# Patient Record
Sex: FEMALE | Race: BLACK OR AFRICAN AMERICAN | NOT HISPANIC OR LATINO | Employment: OTHER | URBAN - METROPOLITAN AREA
[De-identification: names, ages, dates, MRNs, and addresses within clinical notes are randomized per-mention and may not be internally consistent; named-entity substitution may affect disease eponyms.]

---

## 2017-04-17 ENCOUNTER — TELEPHONE (OUTPATIENT)
Dept: INTERNAL MEDICINE | Facility: CLINIC | Age: 62
End: 2017-04-17

## 2017-04-17 NOTE — TELEPHONE ENCOUNTER
----- Message from Della Bailon sent at 4/17/2017 12:47 PM CDT -----  Contact: pt   Pt states that she need orders in for labs.   ..[phone] (home) [phone] (work)

## 2017-04-19 ENCOUNTER — OFFICE VISIT (OUTPATIENT)
Dept: INTERNAL MEDICINE | Facility: CLINIC | Age: 62
End: 2017-04-19
Payer: COMMERCIAL

## 2017-04-19 VITALS
DIASTOLIC BLOOD PRESSURE: 65 MMHG | BODY MASS INDEX: 36.47 KG/M2 | WEIGHT: 198.19 LBS | TEMPERATURE: 97 F | HEIGHT: 62 IN | RESPIRATION RATE: 16 BRPM | OXYGEN SATURATION: 99 % | SYSTOLIC BLOOD PRESSURE: 141 MMHG | HEART RATE: 63 BPM

## 2017-04-19 DIAGNOSIS — Z11.59 ENCOUNTER FOR HEPATITIS C SCREENING TEST FOR LOW RISK PATIENT: Primary | ICD-10-CM

## 2017-04-19 DIAGNOSIS — Z00.00 ROUTINE HEALTH MAINTENANCE: ICD-10-CM

## 2017-04-19 DIAGNOSIS — Z13.220 LIPID SCREENING: ICD-10-CM

## 2017-04-19 DIAGNOSIS — M25.561 CHRONIC PAIN OF RIGHT KNEE: ICD-10-CM

## 2017-04-19 DIAGNOSIS — G89.29 CHRONIC PAIN OF RIGHT KNEE: ICD-10-CM

## 2017-04-19 PROCEDURE — 99999 PR PBB SHADOW E&M-EST. PATIENT-LVL III: CPT | Mod: PBBFAC,,, | Performed by: FAMILY MEDICINE

## 2017-04-19 PROCEDURE — 99396 PREV VISIT EST AGE 40-64: CPT | Mod: S$GLB,,, | Performed by: FAMILY MEDICINE

## 2017-04-19 RX ORDER — VITAMIN B COMPLEX
1 CAPSULE ORAL DAILY
COMMUNITY
End: 2021-11-19

## 2017-04-19 RX ORDER — IBUPROFEN 200 MG
200 TABLET ORAL EVERY 6 HOURS PRN
COMMUNITY
End: 2017-12-06 | Stop reason: ALTCHOICE

## 2017-04-19 RX ORDER — CYANOCOBALAMIN (VITAMIN B-12) 250 MCG
250 TABLET ORAL DAILY
COMMUNITY
End: 2021-11-19 | Stop reason: ALTCHOICE

## 2017-04-19 RX ORDER — FERROUS SULFATE, DRIED 160(50) MG
1 TABLET, EXTENDED RELEASE ORAL 2 TIMES DAILY WITH MEALS
COMMUNITY

## 2017-04-19 RX ORDER — BIOTIN 1 MG
1000 TABLET ORAL 3 TIMES DAILY
COMMUNITY

## 2017-04-19 RX ORDER — DICLOFENAC SODIUM 10 MG/G
2 GEL TOPICAL 4 TIMES DAILY
Qty: 100 G | Refills: 2 | Status: SHIPPED | OUTPATIENT
Start: 2017-04-19 | End: 2021-02-18

## 2017-04-19 NOTE — PROGRESS NOTES
Subjective:       Patient ID: Bonnie Vazquez is a 61 y.o. female.    Chief Complaint: Knee Pain (rt ) and Annual Exam    HPI  Here today for routine health maintenance exam.  The only complaint that she has at this time is right knee pain that has been bothering her on and off.  For the last few weeks she has had more pain and is tried taking up to milligrams of ibuprofen a couple times a day and she does not feel that that helped very much.  She does not have any recent trauma or inciting event.  Many years ago she had a fall that caused injury to her right knee but she is not sure if there are any x-ray findings of the time.  She did through physical therapy at that time which was in 2009.  Her left knee does not really bother her very much.  Pain is worse with activity and when she is on her feet a lot.  In regards to health maintenance she is up-to-date on mammogram and Pap smear which she had at [facility].  Not needing any vaccinations at this time.  She thinks she may be coming due for her colonoscopy as her last was in 2010 but it was completely normal.  She is aware that she needs to exercise more frequently to lose weight which will help her knee and also keep blood pressure down.    Family History   Problem Relation Age of Onset    Heart disease Mother     Cataracts Mother     Diabetes Father     Diabetes Brother     Breast cancer Neg Hx     Colon cancer Neg Hx     Ovarian cancer Neg Hx     Thrombophilia Neg Hx        Current Outpatient Prescriptions:     b complex vitamins capsule, Take 1 capsule by mouth once daily., Disp: , Rfl:     biotin 1 mg tablet, Take 1,000 mcg by mouth 3 (three) times daily., Disp: , Rfl:     calcium-vitamin D3 (CALCIUM 500 + D) 500 mg(1,250mg) -200 unit per tablet, Take 1 tablet by mouth 2 (two) times daily with meals., Disp: , Rfl:     cyanocobalamin (VITAMIN B-12) 250 MCG tablet, Take 250 mcg by mouth once daily., Disp: , Rfl:     ibuprofen  "(ADVIL,MOTRIN) 200 MG tablet, Take 200 mg by mouth every 6 (six) hours as needed for Pain., Disp: , Rfl:     Review of Systems   Constitutional: Negative for chills and fever.   HENT: Negative for congestion and sore throat.    Eyes: Negative for visual disturbance.   Respiratory: Negative for cough and shortness of breath.    Cardiovascular: Negative for chest pain.   Gastrointestinal: Negative for abdominal pain, constipation and diarrhea.   Genitourinary: Negative for difficulty urinating and menstrual problem.   Musculoskeletal: Positive for arthralgias.   Skin: Negative for rash.   Neurological: Negative for dizziness.       Objective:   BP (!) 141/65 (BP Location: Left arm, Patient Position: Sitting, BP Method: Automatic)  Pulse 63  Temp 97.2 °F (36.2 °C) (Tympanic)   Resp 16  Ht 5' 1.5" (1.562 m)  Wt 89.9 kg (198 lb 3.1 oz)  SpO2 99%  BMI 36.84 kg/m2     Physical Exam   Constitutional: She is oriented to person, place, and time. She appears well-developed and well-nourished. No distress.   HENT:   Head: Normocephalic and atraumatic.   Nose: Nose normal.   Eyes: Conjunctivae and EOM are normal. Pupils are equal, round, and reactive to light. Right eye exhibits no discharge. Left eye exhibits no discharge.   Neck: No thyromegaly present.   Cardiovascular: Normal rate and regular rhythm.    No murmur heard.  Pulmonary/Chest: Effort normal and breath sounds normal. No respiratory distress.   Abdominal: Soft. She exhibits no distension.   Musculoskeletal: She exhibits no edema.        Right knee: She exhibits normal range of motion (no crepitus), no swelling, no effusion, no deformity, no erythema, normal alignment, no LCL laxity and no MCL laxity. Tenderness found. Medial joint line tenderness noted.   Neurological: She is alert and oriented to person, place, and time.   Skin: No rash noted. She is not diaphoretic.   Psychiatric: She has a normal mood and affect. Her behavior is normal.       Assessment & " Plan     1. Encounter for hepatitis C screening test for low risk patient  - Hepatitis C antibody; Future    2. Routine health maintenance  She is up-to-date on routine health screening as well as vaccinations.  Only question currently is when she needs her next colonoscopy.  I have sent a message to gastroenterology for advice.  - Comprehensive metabolic panel; Future    3. Lipid screening  - Lipid panel; Future    4. Chronic pain of right knee  Most likely diagnosis is osteoarthritis.  We'll get x-ray imaging to further assess.  Recommended using Voltaren gel since oral anti-inflammatory medication has not been very effective.  Will consider corticosteroid injection if need be.  - X-Ray Knee AP Standing Bilateral; Future

## 2017-04-19 NOTE — MR AVS SNAPSHOT
[facility]  [address] [address]  [place] [address]  Phone: [phone]                  Bonnie Vazquez   2017 5:20 PM   Office Visit    Description:  Female : 1955   Provider:  Nando Hernandez DO   Department:  [facility]           Reason for Visit     Knee Pain     Annual Exam           Diagnoses this Visit        Comments    Encounter for hepatitis C screening test for low risk patient    -  Primary     Routine health maintenance         Lipid screening         Chronic pain of right knee                To Do List           Future Appointments        Provider Department Dept Phone    2017 8:20 AM I[facility] LABORATORY [facility] [phone]    2017 9:00 AM IBV XR1 [facility] [phone]    10/19/2017 4:40 PM Nando Hernandez DO [facility] [phone]      Goals (5 Years of Data)     None      Follow-Up and Disposition     Return in about 6 months (around 10/19/2017).       These Medications        Disp Refills Start End    diclofenac sodium (VOLTAREN) 1 % Gel 100 g 2 2017    Apply 2 g topically 4 (four) times daily. Apply to affected area. - Topical    Pharmacy: [facility] [address] [place] [address] [id number] [place] [address] Ph #: [phone]         [facility]     [facility] Nurse Care Line - 24/ Assistance  Unless otherwise directed by your provider, please contact [facility], our nurse care line that is available for 24/7 assistance.     Registered nurses in the [facility] provide: appointment scheduling, clinical advisement, health education, and other advisory services.  Call: [phone] (toll free)               Medications           Message regarding Medications     Verify the changes and/or additions to your medication regime listed below are the same as discussed with your clinician today.  If any of these changes or  "additions are incorrect, please notify your healthcare provider.        START taking these NEW medications        Refills    diclofenac sodium (VOLTAREN) 1 % Gel 2    Sig: Apply 2 g topically 4 (four) times daily. Apply to affected area.    Class: Normal    Route: Topical      STOP taking these medications     acetaminophen (TYLENOL) 650 MG TbSR Take 650 mg by mouth every 4 (four) hours as needed.           Verify that the below list of medications is an accurate representation of the medications you are currently taking.  If none reported, the list may be blank. If incorrect, please contact your healthcare provider. Carry this list with you in case of emergency.           Current Medications     b complex vitamins capsule Take 1 capsule by mouth once daily.    biotin 1 mg tablet Take 1,000 mcg by mouth 3 (three) times daily.    calcium-vitamin D3 (CALCIUM 500 + D) 500 mg(1,250mg) -200 unit per tablet Take 1 tablet by mouth 2 (two) times daily with meals.    cyanocobalamin (VITAMIN B-12) 250 MCG tablet Take 250 mcg by mouth once daily.    ibuprofen (ADVIL,MOTRIN) 200 MG tablet Take 200 mg by mouth every 6 (six) hours as needed for Pain.    diclofenac sodium (VOLTAREN) 1 % Gel Apply 2 g topically 4 (four) times daily. Apply to affected area.           Clinical Reference Information           Your Vitals Were     BP Pulse Temp Resp    141/65 (BP Location: Left arm, Patient Position: Sitting, BP Method: Automatic) 63 97.2 °F (36.2 °C) (Tympanic) 16    Height Weight SpO2 BMI    5' 1.5" (1.562 m) 89.9 kg (198 lb 3.1 oz) 99% 36.84 kg/m2      Blood Pressure          Most Recent Value    BP  (!)  141/65      Allergies as of 4/19/2017     Codeine      Immunizations Administered on Date of Encounter - 4/19/2017     None      Orders Placed During Today's Visit     Future Labs/Procedures Expected by Expires    Comprehensive metabolic panel  4/19/2017 6/18/2018    Hepatitis C antibody  4/19/2017 6/18/2018    Lipid panel  " 4/19/2017 (Approximate) 6/18/2018    X-Ray Knee AP Standing Bilateral  4/19/2017 4/19/2018      [facility] Sign-Up     Activating your [facility] account is as easy as 1-2-3!     1) Visit [email or web address], select Sign Up Now, enter this activation code and your date of birth, then select Next.  [email or web address]  Expires: 6/3/2017  5:25 PM      2) Create a username and password to use when you visit [facility] in the future and select a security question in case you lose your password and select Next.    3) Enter your e-mail address and click Sign Up!    Additional Information  If you have questions, please e-mail [email or web address] or call [phone] to talk to our [facility] staff. Remember, [facility] is NOT to be used for urgent needs. For medical emergencies, dial 911.         Instructions      Prevention Guidelines, Women Ages 50 to 64  Screening tests and vaccines are an important part of managing your health. Health counseling is essential, too. Below are guidelines for these, for women ages 50 to 64. Talk with your healthcare provider to make sure youre up to date on what you need.  Screening Who needs it How often   Type 2 diabetes or prediabetes All adults beginning at age 45 and adults without symptoms at any age who are overweight or obese and have 1 or more additional risk factors for diabetes. At  least every 3 years   Alcohol misuse All women in this age group At routine exams   Blood pressure All women in this age group Every 2 years if your blood pressure is less than 120/80 mm Hg; yearly if your systolic blood pressure is 120 to 139 mm Hg, or your diastolic blood pressure reading is 80 to 89 mm Hg   Breast cancer All women in this age group Yearly mammogram and clinical breast exam1   Cervical cancer All women in this age group, except women who have had a complete hysterectomy Pap test every 3 years or Pap test with human papillomavirus (HPV) test every 5 years   Chlamydia Women at increased risk  for infection At routine exams   Colorectal cancer All women in this age group Flexible sigmoidoscopy every 5 years, or colonoscopy every 10 years, or double-contrast barium enema every 5 years; yearly fecal occult blood test or fecal immunochemical test; or a stool DNA test as often as your health care provider advises; talk with your health care provider about which tests are best for you   Depression All women in this age group At routine exams   Gonorrhea Sexually active women at increased risk for infection At routine exams   Hepatitis C Anyone at increased risk; 1 time for those born between 1945 and 1965 At routine exams   High cholesterol or triglycerides All women in this age group who are at risk for coronary artery disease At least every 5 years   HIV All women At routine exams   Lung cancer Adults age 55 to 80 who have smoked Yearly screening in smokers with 30 pack-year history of smoking or who quit within 15 years   Obesity All women in this age group At routine exams   Osteoporosis Women who are postmenopausal Ask your healthcare provider   Syphilis Women at increased risk for infection - talk with your healthcare provider At routine exams   Tuberculosis Women at increased risk for infection - talk with your healthcare provider Ask your healthcare provider   Vision All women in this age group Ask your healthcare provider   Vaccine Who needs it How often   Chickenpox (varicella) All women in this age group who have no record of this infection or vaccine 2 doses; the second dose should be given at least 4 weeks after the first dose   Hepatitis A Women at increased risk for infection - talk with your healthcare provider 2 doses given at least 6 months apart   Hepatitis B Women at increased risk for infection - talk with your healthcare provider 3 doses over 6 months; second dose should be given 1 month after the first dose; the third dose should be given at least 2 months after the second dose and at  least 4 months after the first dose   Haemophilus influenzaeType B (HIB) Women at increased risk for infection - talk with your healthcare provider 1 to 3 doses   Influenza (flu) All women in this age group Once a year   Measles, mumps, rubella (MMR) Women in this age group through their late 50s who have no record of these infections or vaccines 1 dose   Meningococcal Women at increased risk for infection - talk with your healthcare provider 1 or more doses   Pneumococcal conjugate vaccine (PCV13) and pneumococcal polysaccharide vaccine (PPSV23) Women at increased risk for infection - talk with your healthcare provider PCV13: 1 dose ages 19 to 65 (protects against 13 types of pneumococcal bacteria)  PPSV23: 1 to 2 doses through age 64, or 1 dose at 65 or older (protects against 23 types of pneumococcal bacteria)   Tetanus/diphtheria/pertussis (Td/Tdap) booster All women in this age group Td every 10 years, or a one-time dose of Tdap instead of a Td booster after age 18, then Td every 10 years   Zoster All women ages 60 and older 1 dose   Counseling Who needs it How often   BRCA gene mutation testing for breast and ovarian cancer susceptibility Women with increased risk for having gene mutation When your risk is known   Breast cancer and chemoprevention Women at high risk for breast cancer When your risk is known   Diet and exercise Women who are overweight or obese When diagnosed, and then at routine exams   Sexually transmitted infection prevention Women at increased risk for infection - talk with your healthcare provider At routine exams   Use of daily aspirin Women ages 55 and up in this age group who are at risk for cardiovascular health problems such as stroke When your risk is known   Use of tobacco and the health effects it can cause All women in this age group Every exam   1American Cancer Society  Date Last Reviewed: 1/26/2016  © 3206-7150 [employer]. [address] [place] [address], [place]  [address]. All rights reserved. This information is not intended as a substitute for professional medical care. Always follow your healthcare professional's instructions.             Language Assistance Services     ATTENTION: Language assistance services are available, free of charge. Please call [phone].      ATENCIÓN: Si saulla maryan, tiene a amato disposición servicios gratuitos de asistencia lingüística. Llame al [phone].     CHÚ Ý: N?u b?n nói Ti?ng Vi?t, có các d?ch v? h? tr? ngôn ng? mi?n phí dành cho b?n. G?i s? [phone].         [facility] complies with applicable Federal civil rights laws and does not discriminate on the basis of race, color, national origin, age, disability, or sex.

## 2017-04-19 NOTE — PATIENT INSTRUCTIONS
Prevention Guidelines, Women Ages 50 to 64  Screening tests and vaccines are an important part of managing your health. Health counseling is essential, too. Below are guidelines for these, for women ages 50 to 64. Talk with your healthcare provider to make sure youre up to date on what you need.  Screening Who needs it How often   Type 2 diabetes or prediabetes All adults beginning at age 45 and adults without symptoms at any age who are overweight or obese and have 1 or more additional risk factors for diabetes. At  least every 3 years   Alcohol misuse All women in this age group At routine exams   Blood pressure All women in this age group Every 2 years if your blood pressure is less than 120/80 mm Hg; yearly if your systolic blood pressure is 120 to 139 mm Hg, or your diastolic blood pressure reading is 80 to 89 mm Hg   Breast cancer All women in this age group Yearly mammogram and clinical breast exam1   Cervical cancer All women in this age group, except women who have had a complete hysterectomy Pap test every 3 years or Pap test with human papillomavirus (HPV) test every 5 years   Chlamydia Women at increased risk for infection At routine exams   Colorectal cancer All women in this age group Flexible sigmoidoscopy every 5 years, or colonoscopy every 10 years, or double-contrast barium enema every 5 years; yearly fecal occult blood test or fecal immunochemical test; or a stool DNA test as often as your health care provider advises; talk with your health care provider about which tests are best for you   Depression All women in this age group At routine exams   Gonorrhea Sexually active women at increased risk for infection At routine exams   Hepatitis C Anyone at increased risk; 1 time for those born between 1945 and 1965 At routine exams   High cholesterol or triglycerides All women in this age group who are at risk for coronary artery disease At least every 5 years   HIV All women At routine exams   Lung  cancer Adults age 55 to 80 who have smoked Yearly screening in smokers with 30 pack-year history of smoking or who quit within 15 years   Obesity All women in this age group At routine exams   Osteoporosis Women who are postmenopausal Ask your healthcare provider   Syphilis Women at increased risk for infection - talk with your healthcare provider At routine exams   Tuberculosis Women at increased risk for infection - talk with your healthcare provider Ask your healthcare provider   Vision All women in this age group Ask your healthcare provider   Vaccine Who needs it How often   Chickenpox (varicella) All women in this age group who have no record of this infection or vaccine 2 doses; the second dose should be given at least 4 weeks after the first dose   Hepatitis A Women at increased risk for infection - talk with your healthcare provider 2 doses given at least 6 months apart   Hepatitis B Women at increased risk for infection - talk with your healthcare provider 3 doses over 6 months; second dose should be given 1 month after the first dose; the third dose should be given at least 2 months after the second dose and at least 4 months after the first dose   Haemophilus influenzaeType B (HIB) Women at increased risk for infection - talk with your healthcare provider 1 to 3 doses   Influenza (flu) All women in this age group Once a year   Measles, mumps, rubella (MMR) Women in this age group through their late 50s who have no record of these infections or vaccines 1 dose   Meningococcal Women at increased risk for infection - talk with your healthcare provider 1 or more doses   Pneumococcal conjugate vaccine (PCV13) and pneumococcal polysaccharide vaccine (PPSV23) Women at increased risk for infection - talk with your healthcare provider PCV13: 1 dose ages 19 to 65 (protects against 13 types of pneumococcal bacteria)  PPSV23: 1 to 2 doses through age 64, or 1 dose at 65 or older (protects against 23 types of  pneumococcal bacteria)   Tetanus/diphtheria/pertussis (Td/Tdap) booster All women in this age group Td every 10 years, or a one-time dose of Tdap instead of a Td booster after age 18, then Td every 10 years   Zoster All women ages 60 and older 1 dose   Counseling Who needs it How often   BRCA gene mutation testing for breast and ovarian cancer susceptibility Women with increased risk for having gene mutation When your risk is known   Breast cancer and chemoprevention Women at high risk for breast cancer When your risk is known   Diet and exercise Women who are overweight or obese When diagnosed, and then at routine exams   Sexually transmitted infection prevention Women at increased risk for infection - talk with your healthcare provider At routine exams   Use of daily aspirin Women ages 55 and up in this age group who are at risk for cardiovascular health problems such as stroke When your risk is known   Use of tobacco and the health effects it can cause All women in this age group Every exam   1American Cancer Society  Date Last Reviewed: 1/26/2016  © 1864-6024 [employer], [facility]. [address] [place] [address], [place] [address]. All rights reserved. This information is not intended as a substitute for professional medical care. Always follow your healthcare professional's instructions.

## 2017-04-20 ENCOUNTER — TELEPHONE (OUTPATIENT)
Dept: INTERNAL MEDICINE | Facility: CLINIC | Age: 62
End: 2017-04-20

## 2017-04-20 DIAGNOSIS — Z12.11 COLON CANCER SCREENING: Primary | ICD-10-CM

## 2017-04-20 NOTE — TELEPHONE ENCOUNTER
----- Message from RENUKA Patterson sent at 4/20/2017  8:28 AM CDT -----  Good Morning  The colonoscopy is in the [facility] Documents under surgical procedures.  Looks like it was recommended for a repeat in 7 years.  Since she has polyps previously, I would agree with that.  If she is doing well, she doesn't really need an appt with us.  You can order the colonoscopy and our schedulers will get with the pt to schedule.     Have a great day!  Ioana  ----- Message -----     From: Nando Hernandez DO     Sent: 4/19/2017   5:11 PM       To: RENUKA Patterson    This lady reports having colonoscopy in 2010 that was normal. Previously she said she had a polyp on initial colonoscopy. Should she have repeat now or in 2020?   I can't find report.  Thanks!

## 2017-04-24 RX ORDER — SODIUM, POTASSIUM,MAG SULFATES 17.5-3.13G
SOLUTION, RECONSTITUTED, ORAL ORAL
Qty: 354 ML | Refills: 0 | Status: SHIPPED | OUTPATIENT
Start: 2017-04-24 | End: 2019-03-04

## 2017-04-25 ENCOUNTER — HOSPITAL ENCOUNTER (OUTPATIENT)
Dept: RADIOLOGY | Facility: HOSPITAL | Age: 62
Discharge: HOME | End: 2017-04-25
Attending: FAMILY MEDICINE
Payer: COMMERCIAL

## 2017-04-25 DIAGNOSIS — G89.29 CHRONIC PAIN OF RIGHT KNEE: ICD-10-CM

## 2017-04-25 DIAGNOSIS — M25.561 CHRONIC PAIN OF RIGHT KNEE: ICD-10-CM

## 2017-04-25 PROCEDURE — 73562 X-RAY EXAM OF KNEE 3: CPT | Mod: 26,50,, | Performed by: RADIOLOGY

## 2017-04-25 PROCEDURE — 73562 X-RAY EXAM OF KNEE 3: CPT | Mod: TC,50,PO

## 2017-04-27 ENCOUNTER — TELEPHONE (OUTPATIENT)
Dept: INTERNAL MEDICINE | Facility: CLINIC | Age: 62
End: 2017-04-27

## 2017-04-27 NOTE — TELEPHONE ENCOUNTER
----- Message from Nando Hernandez DO sent at 4/26/2017 10:46 PM CDT -----  Signs of mild arthritis

## 2017-05-01 ENCOUNTER — TELEPHONE (OUTPATIENT)
Dept: INTERNAL MEDICINE | Facility: CLINIC | Age: 62
End: 2017-05-01

## 2017-05-03 NOTE — TELEPHONE ENCOUNTER
Ok to release results. All normal (Routing comment)       Normal results given. Pt verbalized understanding.

## 2017-10-09 ENCOUNTER — PATIENT OUTREACH (OUTPATIENT)
Dept: ADMINISTRATIVE | Facility: HOSPITAL | Age: 62
End: 2017-10-09

## 2017-12-06 ENCOUNTER — OFFICE VISIT (OUTPATIENT)
Dept: URGENT CARE | Facility: CLINIC | Age: 62
End: 2017-12-06
Payer: COMMERCIAL

## 2017-12-06 ENCOUNTER — HOSPITAL ENCOUNTER (OUTPATIENT)
Dept: RADIOLOGY | Facility: HOSPITAL | Age: 62
Discharge: HOME | End: 2017-12-06
Attending: NURSE PRACTITIONER
Payer: COMMERCIAL

## 2017-12-06 VITALS
WEIGHT: 203.38 LBS | DIASTOLIC BLOOD PRESSURE: 88 MMHG | SYSTOLIC BLOOD PRESSURE: 140 MMHG | HEART RATE: 81 BPM | HEIGHT: 62 IN | OXYGEN SATURATION: 99 % | TEMPERATURE: 99 F | BODY MASS INDEX: 37.43 KG/M2

## 2017-12-06 DIAGNOSIS — M25.522 LEFT ELBOW PAIN: ICD-10-CM

## 2017-12-06 DIAGNOSIS — M25.522 LEFT ELBOW PAIN: Primary | ICD-10-CM

## 2017-12-06 PROCEDURE — 96372 THER/PROPH/DIAG INJ SC/IM: CPT | Mod: S$GLB,,, | Performed by: FAMILY MEDICINE

## 2017-12-06 PROCEDURE — 99999 PR PBB SHADOW E&M-EST. PATIENT-LVL IV: CPT | Mod: PBBFAC,,, | Performed by: NURSE PRACTITIONER

## 2017-12-06 PROCEDURE — 73080 X-RAY EXAM OF ELBOW: CPT | Mod: TC,PO,LT

## 2017-12-06 PROCEDURE — 73080 X-RAY EXAM OF ELBOW: CPT | Mod: 26,LT,, | Performed by: RADIOLOGY

## 2017-12-06 PROCEDURE — 99214 OFFICE O/P EST MOD 30 MIN: CPT | Mod: 25,S$GLB,, | Performed by: NURSE PRACTITIONER

## 2017-12-06 RX ORDER — IBUPROFEN 600 MG/1
600 TABLET ORAL EVERY 6 HOURS PRN
Qty: 30 TABLET | Refills: 0 | Status: SHIPPED | OUTPATIENT
Start: 2017-12-06 | End: 2017-12-16

## 2017-12-06 RX ORDER — KETOROLAC TROMETHAMINE 30 MG/ML
30 INJECTION, SOLUTION INTRAMUSCULAR; INTRAVENOUS
Status: COMPLETED | OUTPATIENT
Start: 2017-12-06 | End: 2017-12-06

## 2017-12-06 RX ADMIN — KETOROLAC TROMETHAMINE 30 MG: 30 INJECTION, SOLUTION INTRAMUSCULAR; INTRAVENOUS at 04:12

## 2017-12-06 NOTE — PROGRESS NOTES
"Subjective:      Patient ID: Bonnie Vazquez is a 62 y.o. female.    Chief Complaint: Fall    Fall   The accident occurred 5 to 7 days ago (11/29/17). The fall occurred while walking. She landed on hard floor. The point of impact was the right hip and left elbow. The pain is present in the right hip and left elbow (right leg is improving; she is here today to have the elbow checked). The symptoms are aggravated by movement, use of injured limb and pressure on injury. Associated symptoms include tingling (left hand). Pertinent negatives include no fever, nausea, visual change or vomiting. She has tried acetaminophen and rest for the symptoms. The treatment provided no relief.     Review of Systems   Constitutional: Negative.  Negative for fever.   HENT: Negative.    Respiratory: Negative.    Cardiovascular: Negative.    Gastrointestinal: Negative.  Negative for nausea and vomiting.   Musculoskeletal: Positive for arthralgias (left elbow).   Skin: Negative.    Neurological: Positive for tingling (left hand).        Tingling to left hand   Hematological: Negative.        Objective:   BP (!) 140/88   Pulse 81   Temp 98.6 °F (37 °C)   Ht 5' 1.5" (1.562 m)   Wt 92.3 kg (203 lb 6 oz)   SpO2 99%   BMI 37.80 kg/m²   Physical Exam   Constitutional: She is oriented to person, place, and time. She appears well-developed and well-nourished. No distress.   HENT:   Head: Normocephalic and atraumatic.   Right Ear: External ear normal.   Left Ear: External ear normal.   Nose: Nose normal.   Eyes: Right eye exhibits no discharge. Left eye exhibits no discharge.   Neck: Normal range of motion. Neck supple.   Cardiovascular: Normal rate.    Pulmonary/Chest: Effort normal. No respiratory distress.   Musculoskeletal: Normal range of motion.        Left elbow: She exhibits swelling. She exhibits normal range of motion, no effusion and no deformity. Tenderness found. Lateral epicondyle tenderness noted.        " Arms:  Neurological: She is alert and oriented to person, place, and time.   Skin: Skin is warm and dry. No rash noted. She is not diaphoretic.   Psychiatric: She has a normal mood and affect. Her behavior is normal. Judgment and thought content normal.   Nursing note and vitals reviewed.    Assessment:      1. Left elbow pain       Plan:   Left elbow pain  -     X-Ray Elbow Complete Left; Future; Expected date: 12/06/2017  -     ketorolac injection 30 mg; Inject 30 mg into the muscle one time.  -     ibuprofen (ADVIL,MOTRIN) 600 MG tablet; Take 1 tablet (600 mg total) by mouth every 6 (six) hours as needed for Pain.  Dispense: 30 tablet; Refill: 0    Instructions, follow up, and supportive care as per AVS.  RICE  Ace wrap and ice pack provided.  Will follow up when official reading is available.  Follow up with PCP if not improving within the next 5-7 days with above measures, sooner follow up for any worsening symptoms.

## 2017-12-06 NOTE — PATIENT INSTRUCTIONS
R.I.C.E.    R.I.C.E. stands for Rest, Ice, Compression, and Elevation. Doing these things helps limit pain and swelling after an injury. R.I.C.E. also helps injuries heal faster. Use R.I.C.E. for sprains, strains, and severe bruises or bumps. Follow the tips on this handout and begin R.I.C.E. as soon as possible after an injury.  ? Rest  Pain is your bodys way of telling you to rest an injured area. Whether you have hurt an elbow, hand, foot, or knee, limiting its use will prevent further injury and help you heal.  ? Ice  Applying ice right after an injury helps prevent swelling and reduce pain. Dont place ice directly on your skin.  · Wrap a cold pack or bag of ice in a thin cloth. Place it over the injured area.  · Ice for 10 minutes every 3 hours. Dont ice for more than 20 minutes at a time.  ? Compression  Putting pressure (compression) on an injury helps prevent swelling and provides support.  · Wrap the injured area firmly with an elastic bandage. If your hand or foot tingles, becomes discolored, or feels cold to the touch, the bandage may be too tight. Rewrap it more loosely.  · If your bandage becomes too loose, rewrap it.  · Do not wear an elastic bandage overnight.  ? Elevation  Keeping an injury elevated helps reduce swelling, pain, and throbbing. Elevation is most effective when the injury is kept elevated higher than the heart.     Call your healthcare provider if you notice any of the following:  · Fingers or toes feel numb, are cold to the touch, or change color  · Skin looks shiny or tight  · Pain, swelling, or bruising worsens and is not improved with elevation   Date Last Reviewed: 9/3/2015  © 6447-1997 [employer]. [address] [place] [address], [place] [address]. All rights reserved. This information is not intended as a substitute for professional medical care. Always follow your healthcare professional's instructions.        Elbow Sprain    A sprain is a tearing of the ligaments  that hold a joint together. This may take up to 6 weeks to fully heal, depending on how severe it is. Moderate to severe sprains are treated with a sling or splint. Minor sprains can be treated without any special support.  Home care  The following guidelines will help you care for your injury at home:  · Keep your arm elevated to reduce pain and swelling. When sitting or lying down keep your arm above the level of your heart. You can do this by placing your arm on a pillow that rests on your chest or on a pillow at your side. This is most important during the first 2 days (48 hours) after injury.  · Put an ice pack on the injured area. Do this for 20 minutes every 1 to 2 hours the first day. You can make an ice pack by wrapping a plastic bag of ice cubes in a thin towel. As the ice melts, be careful that the splint doesnt get wet. Continue using the ice pack 3 to 4 times a day for the next 2 days. Then use the ice pack as needed to ease pain and swelling.  · If you were given a plaster or fiberglass splint, leave it on as advised, or until you see your healthcare provider. Keep it dry at all times. Bathe with your splint out of the water. Protect it with a large plastic bag, rubber-banded at the top end. If a fiberglass splint gets wet, you can dry it with a hair dryer. Once the splint is removed, move your elbow through its full range of motion several times a day. This will prevent stiffness.  · If you were given a sling only, begin gradual range-of-motion exercises after the first few days, unless told otherwise. This will prevent stiffness in the elbow. Stop wearing the sling once the pain is better.  · You may use acetaminophen or ibuprofen to control pain, unless another pain medicine was prescribed. If you have chronic liver or kidney disease, talk with your healthcare provider before using these medicines. Also talk with your provider if youve had a stomach ulcer or gastrointestinal bleeding.  Follow-up  care  Follow up with your doctor as directed.  Any X-rays you had today dont show any broken bones, breaks, or fractures. Sometimes fractures dont show up on the first X-ray. Bruises and sprains can sometimes hurt as much as a fracture. These injuries can take time to heal completely. If your symptoms dont improve or they get worse, talk with your healthcare provider. You may need a repeat X-ray or other tests.  When to seek medical advice  Call your healthcare provider right away  if any of these occur:  · The plaster splint becomes wet or soft  · The fiberglass splint remains wet for more than 24 hours  · Bad odor from the splint or wound fluid stains the splint  · Splint cracks  · Tightness or pain in the elbow gets worse  · Fingers become swollen, cold, blue, numb, or tingly  · You are less able to move the elbow, hand or fingers  · Area around splint becomes red, swollen, or irritated  · Fever of 101ºF (38.3ºC) or higher, or as directed by your healthcare provider  Date Last Reviewed: 5/1/2017 © 2000-2017 [employer]. [address] [place] [address]. All rights reserved. This information is not intended as a substitute for professional medical care. Always follow your healthcare professional's instructions.

## 2017-12-06 NOTE — PROGRESS NOTES
After obtaining consent, and per orders of ELENI Valencia NP , injection of  SEE MAR given by Vandana López. Patient instructed to remain in clinic for 20 minutes afterwards, and to report any adverse reaction to me immediately.

## 2017-12-28 ENCOUNTER — OFFICE VISIT (OUTPATIENT)
Dept: INTERNAL MEDICINE | Facility: CLINIC | Age: 62
End: 2017-12-28
Payer: COMMERCIAL

## 2017-12-28 VITALS
OXYGEN SATURATION: 99 % | TEMPERATURE: 97 F | SYSTOLIC BLOOD PRESSURE: 126 MMHG | DIASTOLIC BLOOD PRESSURE: 80 MMHG | BODY MASS INDEX: 37.73 KG/M2 | WEIGHT: 205 LBS | HEIGHT: 62 IN | HEART RATE: 75 BPM | RESPIRATION RATE: 18 BRPM

## 2017-12-28 DIAGNOSIS — J00 COMMON COLD: Primary | ICD-10-CM

## 2017-12-28 PROCEDURE — 99214 OFFICE O/P EST MOD 30 MIN: CPT | Mod: S$GLB,,, | Performed by: FAMILY MEDICINE

## 2017-12-28 PROCEDURE — 99999 PR PBB SHADOW E&M-EST. PATIENT-LVL III: CPT | Mod: PBBFAC,,, | Performed by: FAMILY MEDICINE

## 2017-12-28 NOTE — PROGRESS NOTES
Subjective:       Patient ID: Bonnie Vazquez is a 62 y.o. female.    Chief Complaint: Dizziness; Cough; Otalgia (left); and Headache    Cough   This is a new problem. The current episode started in the past 7 days. The problem has been waxing and waning. The cough is productive of sputum. Associated symptoms include chills, ear pain (left), a fever (subjective but improved), headaches and myalgias. Pertinent negatives include no shortness of breath. She has tried OTC cough suppressant for the symptoms. The treatment provided mild relief.     Overall, she does feel like she is improving but wanted to come in and be sure that there is nothing needed to be done for her ear discomfort.  No lightheadedness and headaches that she was having are improving    Family History   Problem Relation Age of Onset    Heart disease Mother     Cataracts Mother     Diabetes Father     Diabetes Brother     Breast cancer Neg Hx     Colon cancer Neg Hx     Ovarian cancer Neg Hx     Thrombophilia Neg Hx        Current Outpatient Prescriptions:     biotin 1 mg tablet, Take 1,000 mcg by mouth 3 (three) times daily., Disp: , Rfl:     calcium-vitamin D3 (CALCIUM 500 + D) 500 mg(1,250mg) -200 unit per tablet, Take 1 tablet by mouth 2 (two) times daily with meals., Disp: , Rfl:     diclofenac sodium (VOLTAREN) 1 % Gel, Apply 2 g topically 4 (four) times daily. Apply to affected area., Disp: 100 g, Rfl: 2    b complex vitamins capsule, Take 1 capsule by mouth once daily., Disp: , Rfl:     cyanocobalamin (VITAMIN B-12) 250 MCG tablet, Take 250 mcg by mouth once daily., Disp: , Rfl:     sodium,potassium,mag sulfates (SUPREP BOWEL PREP KIT) 17.5-3.13-1.6 gram SolR, As directed, Disp: 354 mL, Rfl: 0    Review of Systems   Constitutional: Positive for chills and fever (subjective but improved).   HENT: Positive for ear pain (left).    Respiratory: Positive for cough. Negative for shortness of breath.    Musculoskeletal: Positive  "for myalgias.   Neurological: Positive for dizziness and headaches.       Objective:   /80 (BP Location: Right arm, Patient Position: Sitting, BP Method: X-Large (Manual))   Pulse 75   Temp 96.8 °F (36 °C) (Tympanic)   Resp 18   Ht 5' 1.5" (1.562 m)   Wt 93 kg (205 lb 0.4 oz)   SpO2 99%   BMI 38.11 kg/m²      Physical Exam   Constitutional: She is oriented to person, place, and time. She appears well-developed and well-nourished. No distress.   HENT:   Head: Normocephalic and atraumatic.   Nose: Nose normal.   Eyes: Conjunctivae and EOM are normal. Pupils are equal, round, and reactive to light. Right eye exhibits no discharge. Left eye exhibits no discharge.   Neck: No thyromegaly present.   Cardiovascular: Normal rate and regular rhythm.    No murmur heard.  Pulmonary/Chest: Effort normal and breath sounds normal. No respiratory distress.   Abdominal: Soft. She exhibits no distension.   Musculoskeletal: She exhibits no edema.   Neurological: She is alert and oriented to person, place, and time.   Skin: No rash noted. She is not diaphoretic.   Psychiatric: She has a normal mood and affect. Her behavior is normal.       Assessment & Plan   Common cold  The constellation of her symptoms are consistent with a viral infection.  She may have in fact, he even had the flu but her symptoms seem to be improving and there are no focal findings on exam that was suggest otherwise.  I recommended that she continue supportive care with what she is taking over-the-counter and may consider adding Afrin to help with decongestion.  Advised to contact me or follow up if symptoms worsen such as worsening ear pain or return of fever        Return if symptoms worsen or fail to improve.  "

## 2017-12-28 NOTE — ASSESSMENT & PLAN NOTE
The constellation of her symptoms are consistent with a viral infection.  She may have in fact, he even had the flu but her symptoms seem to be improving and there are no focal findings on exam that was suggest otherwise.  I recommended that she continue supportive care with what she is taking over-the-counter and may consider adding Afrin to help with decongestion.  Advised to contact me or follow up if symptoms worsen such as worsening ear pain or return of fever

## 2017-12-28 NOTE — PATIENT INSTRUCTIONS
May use afrin nose spray along with continued dayquil/nyquil to help with decongestion.       Adult Self-Care for Colds  Colds are caused by viruses. They can't be cured with antibiotics. However, you can ease symptoms and support your body's efforts to heal itself.  No matter which symptoms you have, be sure to:  · Drink plenty of fluids (water or clear soup)  · Stop smoking and drinking alcohol  · Get plenty of rest    Understand a fever  · Take your temperature several times a day. If your fever is 100.4°F (38.0°C) for more than a day, call your healthcare provider.  · Relax, lie down. Go to bed if you want. Just get off your feet and rest. Also, drink plenty of fluids to avoid dehydration.  · Take acetaminophen or a nonsteroidal anti-inflammatory agent (NSAID), such as ibuprofen.  Treat a troubled nose kindly  · Breathe steam or heated humidified air to open blocked nasal passages.  a hot shower or use a vaporizer. Be careful not to get burned by the steam.  · Saline nasal sprays and decongestant tablets help open a stuffy nose. Antihistamines can also help, but they can cause side effects such as drowsiness and drying of the eyes, nose, and mouth.  Soothe a sore throat and cough  · Gargle every 2 hours with 1/4 teaspoon of salt dissolved in 1/2 cup of warm water. Suck on throat lozenges and cough drops to moisten your throat.  · Cough medicines are available but it is unclear how well they actually work.  · Take acetaminophen or an NSAID, such as ibuprofen, to ease throat pain  Ease digestive problems  · Put fluids back into your body. Take frequent sips of clear liquids such as water or broth. Avoid drinks that have a lot of sugar in them, such as juices and sodas. These can make diarrhea worse. Older children and adults can drink sports drinks.  · As your appetite returns, you can resume your normal diet. Ask your healthcare provider if there are any foods you should avoid.  When to seek medical  care  When you first notice symptoms, ask your healthcare provider if antiviral medicines are appropriate. Antibiotics should not be taken for colds or flu. Also, call your healthcare provider if you have any of the following symptoms or if you aren't feeling better after 7 days:  · Shortness of breath  · Pain or pressure in the chest or belly (abdomen)  · Worsening symptoms, especially after a period of improvement  · Fever of 100.4°F  (38.0°C) or higher, or fever that doesn't go down with medicine  · Sudden dizziness or confusion  · Severe or continued vomiting  · Signs of dehydration, including extreme thirst, dark urine, infrequent urination, dry mouth  · Spotted, red, or very sore throat   Date Last Reviewed: 12/1/2016  © 2567-7470 [employer]. [address] [place] [address], [place] [address]. All rights reserved. This information is not intended as a substitute for professional medical care. Always follow your healthcare professional's instructions.

## 2018-01-27 ENCOUNTER — OFFICE VISIT (OUTPATIENT)
Dept: URGENT CARE | Facility: CLINIC | Age: 63
End: 2018-01-27
Payer: COMMERCIAL

## 2018-01-27 VITALS
BODY MASS INDEX: 37 KG/M2 | DIASTOLIC BLOOD PRESSURE: 50 MMHG | WEIGHT: 201.06 LBS | HEIGHT: 62 IN | OXYGEN SATURATION: 100 % | SYSTOLIC BLOOD PRESSURE: 140 MMHG | HEART RATE: 68 BPM | TEMPERATURE: 96 F

## 2018-01-27 DIAGNOSIS — J32.9 SINUSITIS, UNSPECIFIED CHRONICITY, UNSPECIFIED LOCATION: Primary | ICD-10-CM

## 2018-01-27 DIAGNOSIS — D55.0: Chronic | ICD-10-CM

## 2018-01-27 DIAGNOSIS — R05.9 COUGH: ICD-10-CM

## 2018-01-27 DIAGNOSIS — D75.A G6PD DEFICIENCY: Chronic | ICD-10-CM

## 2018-01-27 PROBLEM — J00 COMMON COLD: Status: RESOLVED | Noted: 2017-12-28 | Resolved: 2018-01-27

## 2018-01-27 PROCEDURE — 99214 OFFICE O/P EST MOD 30 MIN: CPT | Mod: S$GLB,,, | Performed by: INTERNAL MEDICINE

## 2018-01-27 PROCEDURE — 99999 PR PBB SHADOW E&M-EST. PATIENT-LVL III: CPT | Mod: PBBFAC,,, | Performed by: INTERNAL MEDICINE

## 2018-01-27 RX ORDER — FLUTICASONE PROPIONATE 50 MCG
2 SPRAY, SUSPENSION (ML) NASAL DAILY
Qty: 16 G | Refills: 1 | Status: SHIPPED | OUTPATIENT
Start: 2018-01-27 | End: 2018-02-26

## 2018-01-27 RX ORDER — PROMETHAZINE HYDROCHLORIDE AND DEXTROMETHORPHAN HYDROBROMIDE 6.25; 15 MG/5ML; MG/5ML
5 SYRUP ORAL NIGHTLY PRN
Qty: 180 ML | Refills: 0 | Status: SHIPPED | OUTPATIENT
Start: 2018-01-27 | End: 2018-12-17

## 2018-01-27 RX ORDER — AMOXICILLIN AND CLAVULANATE POTASSIUM 875; 125 MG/1; MG/1
1 TABLET, FILM COATED ORAL 2 TIMES DAILY
Qty: 20 TABLET | Refills: 0 | Status: SHIPPED | OUTPATIENT
Start: 2018-01-27 | End: 2018-02-06

## 2018-01-27 RX ORDER — METHYLPREDNISOLONE 4 MG/1
TABLET ORAL
Qty: 1 PACKAGE | Refills: 1 | Status: SHIPPED | OUTPATIENT
Start: 2018-01-27 | End: 2018-12-17

## 2018-01-27 RX ORDER — BENZONATATE 200 MG/1
200 CAPSULE ORAL 3 TIMES DAILY PRN
Qty: 30 CAPSULE | Refills: 0 | Status: SHIPPED | OUTPATIENT
Start: 2018-01-27 | End: 2018-02-06

## 2018-01-27 NOTE — PROGRESS NOTES
"Subjective:      Patient ID: Bonnie Vazquez is a 62 y.o. female.    Chief Complaint: Cough and Nasal Congestion    63 yo with Patient Active Problem List:     G6PD deficiency     Anemia     Headache    Here today c/o cough and sinus problem.      Sinus Problem   This is a new problem. The current episode started 1 to 4 weeks ago. The problem has been gradually worsening since onset. There has been no fever. The pain is mild. Associated symptoms include congestion, coughing, headaches and sinus pressure. Pertinent negatives include no chills, diaphoresis, ear pain, hoarse voice, neck pain, shortness of breath, sneezing, sore throat or swollen glands. (Body aches)     Review of Systems   Constitutional: Negative for chills and diaphoresis.   HENT: Positive for congestion and sinus pressure. Negative for ear pain, hoarse voice, sneezing and sore throat.    Eyes: Negative for visual disturbance.   Respiratory: Positive for cough. Negative for shortness of breath and wheezing.    Cardiovascular: Negative for chest pain, palpitations and leg swelling.   Gastrointestinal: Negative for abdominal pain, nausea and vomiting.   Musculoskeletal: Negative for neck pain.   Neurological: Positive for headaches.     Objective:   BP (!) 140/50 (BP Location: Right arm, Patient Position: Sitting)   Pulse 68   Temp 96.1 °F (35.6 °C) (Tympanic)   Ht 5' 1.5" (1.562 m)   Wt 91.2 kg (201 lb 1 oz)   SpO2 100%   BMI 37.37 kg/m²     Physical Exam   Constitutional: She is oriented to person, place, and time. She appears well-developed and well-nourished. No distress.   HENT:   Head: Normocephalic and atraumatic.   Right Ear: Tympanic membrane normal.   Left Ear: Tympanic membrane normal.   Nose: Mucosal edema and rhinorrhea present. Right sinus exhibits no maxillary sinus tenderness and no frontal sinus tenderness. Left sinus exhibits no maxillary sinus tenderness and no frontal sinus tenderness.   Mouth/Throat: Posterior " oropharyngeal erythema present. No oropharyngeal exudate or posterior oropharyngeal edema.   Eyes: EOM are normal. Pupils are equal, round, and reactive to light.   Neck: Neck supple.   Cardiovascular: Normal rate and regular rhythm.    Pulmonary/Chest: Breath sounds normal. She has no wheezes. She has no rales.   Abdominal: Soft. Bowel sounds are normal. There is no tenderness.   Musculoskeletal: She exhibits no edema.   Lymphadenopathy:     She has no cervical adenopathy.   Neurological: She is alert and oriented to person, place, and time.   Skin: Skin is warm and dry.   Psychiatric: She has a normal mood and affect. Her behavior is normal.       Assessment:     1. Sinusitis, unspecified chronicity, unspecified location    2. Cough    3. G6PD deficiency    4. Glucose-6-phosphate dehydrogenase (G6PD) deficiency anemia      Plan:   Sinusitis, unspecified chronicity, unspecified location  -     amoxicillin-clavulanate 875-125mg (AUGMENTIN) 875-125 mg per tablet; Take 1 tablet by mouth 2 (two) times daily.  Dispense: 20 tablet; Refill: 0  -     methylPREDNISolone (MEDROL DOSEPACK) 4 mg tablet; use as directed  Dispense: 1 Package; Refill: 1  -     fluticasone (FLONASE) 50 mcg/actuation nasal spray; 2 sprays (100 mcg total) by Each Nare route once daily. For nasal congestion or runny nose  Dispense: 16 g; Refill: 1    Cough  -     methylPREDNISolone (MEDROL DOSEPACK) 4 mg tablet; use as directed  Dispense: 1 Package; Refill: 1  -     promethazine-dextromethorphan (PROMETHAZINE-DM) 6.25-15 mg/5 mL Syrp; Take 5 mLs by mouth nightly as needed (cough).  Dispense: 180 mL; Refill: 0  -     benzonatate (TESSALON) 200 MG capsule; Take 1 capsule (200 mg total) by mouth 3 (three) times daily as needed for Cough.  Dispense: 30 capsule; Refill: 0    G6PD deficiency    Glucose-6-phosphate dehydrogenase (G6PD) deficiency anemia    flonase nasal spray 2 spays each nostril for nasal congestion, post nasal drip, runny nose    Delsym  as needed for cough    Sudafed for nasal congestion    Allegra or zyrtec for allergies, post nasal drip, runny nose, watery eyes.    cepacol throat lozenges for sore throat    mucinex for chest congestion.     Tylenol or ibuprofen for pain or fever.             Problem List Items Addressed This Visit        Oncology    G6PD deficiency (Chronic)    Anemia (Chronic)      Other Visit Diagnoses     Sinusitis, unspecified chronicity, unspecified location    -  Primary    Relevant Medications    amoxicillin-clavulanate 875-125mg (AUGMENTIN) 875-125 mg per tablet    methylPREDNISolone (MEDROL DOSEPACK) 4 mg tablet    fluticasone (FLONASE) 50 mcg/actuation nasal spray    Cough        Relevant Medications    methylPREDNISolone (MEDROL DOSEPACK) 4 mg tablet    promethazine-dextromethorphan (PROMETHAZINE-DM) 6.25-15 mg/5 mL Syrp    benzonatate (TESSALON) 200 MG capsule          Follow-up if symptoms worsen or fail to improve.

## 2018-02-12 ENCOUNTER — DOCUMENTATION ONLY (OUTPATIENT)
Dept: ENDOSCOPY | Facility: HOSPITAL | Age: 63
End: 2018-02-12

## 2018-12-17 ENCOUNTER — OFFICE VISIT (OUTPATIENT)
Dept: INTERNAL MEDICINE | Facility: CLINIC | Age: 63
End: 2018-12-17
Payer: COMMERCIAL

## 2018-12-17 VITALS
BODY MASS INDEX: 36.95 KG/M2 | DIASTOLIC BLOOD PRESSURE: 66 MMHG | SYSTOLIC BLOOD PRESSURE: 119 MMHG | HEIGHT: 62 IN | RESPIRATION RATE: 18 BRPM | WEIGHT: 200.81 LBS | OXYGEN SATURATION: 98 % | HEART RATE: 78 BPM | TEMPERATURE: 97 F

## 2018-12-17 DIAGNOSIS — J01.00 ACUTE NON-RECURRENT MAXILLARY SINUSITIS: Primary | ICD-10-CM

## 2018-12-17 PROCEDURE — 99999 PR PBB SHADOW E&M-EST. PATIENT-LVL III: CPT | Mod: PBBFAC,,, | Performed by: FAMILY MEDICINE

## 2018-12-17 PROCEDURE — 99214 OFFICE O/P EST MOD 30 MIN: CPT | Mod: S$GLB,,, | Performed by: FAMILY MEDICINE

## 2018-12-17 PROCEDURE — 3008F BODY MASS INDEX DOCD: CPT | Mod: CPTII,S$GLB,, | Performed by: FAMILY MEDICINE

## 2018-12-17 RX ORDER — METHYLPREDNISOLONE 4 MG/1
TABLET ORAL
Qty: 1 PACKAGE | Refills: 0 | Status: ON HOLD | OUTPATIENT
Start: 2018-12-17 | End: 2019-03-07 | Stop reason: CLARIF

## 2018-12-17 RX ORDER — AZITHROMYCIN 250 MG/1
TABLET, FILM COATED ORAL
Qty: 6 TABLET | Refills: 0 | Status: SHIPPED | OUTPATIENT
Start: 2018-12-17 | End: 2018-12-22

## 2018-12-17 RX ORDER — FLUTICASONE PROPIONATE 50 MCG
1 SPRAY, SUSPENSION (ML) NASAL DAILY
Qty: 16 G | Refills: 5 | Status: SHIPPED | OUTPATIENT
Start: 2018-12-17 | End: 2021-02-18 | Stop reason: ALTCHOICE

## 2018-12-17 NOTE — ASSESSMENT & PLAN NOTE
Due to the severity of her symptoms and past history of similar problems not responded to conservative treatments, recommended azithromycin along with Medrol Dosepak and Flonase.  Told her let me know if symptoms are not improving over the next week.

## 2018-12-17 NOTE — PROGRESS NOTES
Subjective:       Patient ID: Bonnie Vazquez is a 63 y.o. female.    Chief Complaint: Cough; Nasal Congestion; and Otalgia    Sinus Problem   This is a new problem. The current episode started in the past 7 days. The problem has been gradually worsening since onset. There has been no fever. The pain is mild. Associated symptoms include congestion, coughing, ear pain, headaches, a hoarse voice, sinus pressure and a sore throat. Pertinent negatives include no chills, shortness of breath or swollen glands. Past treatments include oral decongestants.     She has similar symptoms like this last year and after battling with it for over a month had to get antibiotics and steroid to help clear up her symptoms.    Family History   Problem Relation Age of Onset    Heart disease Mother     Cataracts Mother     Diabetes Father     Diabetes Brother     Breast cancer Neg Hx     Colon cancer Neg Hx     Ovarian cancer Neg Hx     Thrombophilia Neg Hx        Current Outpatient Medications:     b complex vitamins capsule, Take 1 capsule by mouth once daily., Disp: , Rfl:     biotin 1 mg tablet, Take 1,000 mcg by mouth 3 (three) times daily., Disp: , Rfl:     calcium-vitamin D3 (CALCIUM 500 + D) 500 mg(1,250mg) -200 unit per tablet, Take 1 tablet by mouth 2 (two) times daily with meals., Disp: , Rfl:     cyanocobalamin (VITAMIN B-12) 250 MCG tablet, Take 250 mcg by mouth once daily., Disp: , Rfl:     sodium,potassium,mag sulfates (SUPREP BOWEL PREP KIT) 17.5-3.13-1.6 gram SolR, As directed, Disp: 354 mL, Rfl: 0    azithromycin (Z-THOMAS) 250 MG tablet, Take 2 tablets by mouth on day 1; Take 1 tablet by mouth on days 2-5, Disp: 6 tablet, Rfl: 0    diclofenac sodium (VOLTAREN) 1 % Gel, Apply 2 g topically 4 (four) times daily. Apply to affected area., Disp: 100 g, Rfl: 2    fluticasone (FLONASE) 50 mcg/actuation nasal spray, 1 spray (50 mcg total) by Each Nare route once daily., Disp: 16 g, Rfl: 5     "methylPREDNISolone (MEDROL DOSEPACK) 4 mg tablet, use as directed, Disp: 1 Package, Rfl: 0    Review of Systems   Constitutional: Negative for chills and fever.   HENT: Positive for congestion, ear pain, hoarse voice, sinus pressure and sore throat.    Respiratory: Positive for cough. Negative for shortness of breath.    Cardiovascular: Negative for chest pain.   Gastrointestinal: Negative for abdominal pain.   Skin: Negative for rash.   Neurological: Positive for headaches. Negative for dizziness.       Objective:   /66 (BP Location: Left arm, Patient Position: Sitting, BP Method: Medium (Automatic))   Pulse 78   Temp 96.6 °F (35.9 °C) (Tympanic)   Resp 18   Ht 5' 1.5" (1.562 m)   Wt 91.1 kg (200 lb 13.4 oz)   SpO2 98%   BMI 37.33 kg/m²      Physical Exam   Constitutional: She is oriented to person, place, and time. She appears well-developed and well-nourished. No distress.   HENT:   Head: Normocephalic and atraumatic.   Nose: Mucosal edema present. Right sinus exhibits maxillary sinus tenderness. Left sinus exhibits maxillary sinus tenderness.   Eyes: Conjunctivae and EOM are normal. Pupils are equal, round, and reactive to light. Right eye exhibits no discharge. Left eye exhibits no discharge.   Neck: No thyromegaly present.   Cardiovascular: Normal rate and regular rhythm.   No murmur heard.  Pulmonary/Chest: Effort normal and breath sounds normal. No respiratory distress.   Abdominal: Soft. She exhibits no distension.   Musculoskeletal: She exhibits no edema.   Neurological: She is alert and oriented to person, place, and time. Coordination normal.   Skin: Skin is warm and dry. No rash noted. She is not diaphoretic.   Psychiatric: She has a normal mood and affect. Her behavior is normal.   Vitals reviewed.      Assessment & Plan     Problem List Items Addressed This Visit        ENT    Acute non-recurrent maxillary sinusitis - Primary    Current Assessment & Plan     Due to the severity of her " symptoms and past history of similar problems not responded to conservative treatments, recommended azithromycin along with Medrol Dosepak and Flonase.  Told her let me know if symptoms are not improving over the next week.                 Follow-up if symptoms worsen or fail to improve.

## 2018-12-17 NOTE — PATIENT INSTRUCTIONS
Sinusitis (Antibiotic Treatment)    The sinuses are air-filled spaces within the bones of the face. They connect to the inside of the nose. Sinusitis is an inflammation of the tissue lining the sinus cavity. Sinus inflammation can occur during a cold. It can also be due to allergies to pollens and other particles in the air. Sinusitis can cause symptoms of sinus congestion and fullness. A sinus infection causes fever, headache and facial pain. There is often green or yellow drainage from the nose or into the back of the throat (post-nasal drip). You have been given antibiotics to treat this condition.  Home care:  · Take the full course of antibiotics as instructed. Do not stop taking them, even if you feel better.  · Drink plenty of water, hot tea, and other liquids. This may help thin mucus. It also may promote sinus drainage.  · Heat may help soothe painful areas of the face. Use a towel soaked in hot water. Or,  the shower and direct the hot spray onto your face. Using a vaporizer along with a menthol rub at night may also help.   · An expectorant containing guaifenesin may help thin the mucus and promote drainage from the sinuses.  · Over-the-counter decongestants may be used unless a similar medicine was prescribed. Nasal sprays work the fastest. Use one that contains phenylephrine or oxymetazoline. First blow the nose gently. Then use the spray. Do not use these medicines more often than directed on the label or symptoms may get worse. You may also use tablets containing pseudoephedrine. Avoid products that combine ingredients, because side effects may be increased. Read labels. You can also ask the pharmacist for help. (NOTE: Persons with high blood pressure should not use decongestants. They can raise blood pressure.)  · Over-the-counter antihistamines may help if allergies contributed to your sinusitis.    · Do not use nasal rinses or irrigation during an acute sinus infection, unless told to by  your health care provider. Rinsing may spread the infection to other sinuses.  · Use acetaminophen or ibuprofen to control pain, unless another pain medicine was prescribed. (If you have chronic liver or kidney disease or ever had a stomach ulcer, talk with your doctor before using these medicines. Aspirin should never be used in anyone under 18 years of age who is ill with a fever. It may cause severe liver damage.)  · Don't smoke. This can worsen symptoms.  Follow-up care  Follow up with your healthcare provider or our staff if you are not improving within the next week.  When to seek medical advice  Call your healthcare provider if any of these occur:  · Facial pain or headache becoming more severe  · Stiff neck  · Unusual drowsiness or confusion  · Swelling of the forehead or eyelids  · Vision problems, including blurred or double vision  · Fever of 100.4ºF (38ºC) or higher, or as directed by your healthcare provider  · Seizure  · Breathing problems  · Symptoms not resolving within 10 days  Date Last Reviewed: 4/13/2015  © 0240-4479 [employer]. [address] [place] [address], [place] [address]. All rights reserved. This information is not intended as a substitute for professional medical care. Always follow your healthcare professional's instructions.

## 2019-01-16 ENCOUNTER — TELEPHONE (OUTPATIENT)
Dept: INTERNAL MEDICINE | Facility: CLINIC | Age: 64
End: 2019-01-16

## 2019-01-16 ENCOUNTER — TELEPHONE (OUTPATIENT)
Dept: ENDOSCOPY | Facility: HOSPITAL | Age: 64
End: 2019-01-16

## 2019-01-16 DIAGNOSIS — Z12.11 COLON CANCER SCREENING: Primary | ICD-10-CM

## 2019-01-16 NOTE — TELEPHONE ENCOUNTER
----- Message from Roseann Smith sent at 1/16/2019  3:25 PM CST -----  pls input another colonoscopy order.. [phone]

## 2019-01-16 NOTE — TELEPHONE ENCOUNTER
Spoke with pt about order. Informed pt that Dr. Hernandez has put the order in. Pt voiced understanding

## 2019-01-16 NOTE — TELEPHONE ENCOUNTER
Spoke with pt about order for colonoscopy. Pt stated she saw Dr. Hernandez in December and got the number to call and setup her colonoscopy. That department told her that she need another order put in. Please put in referral for colonoscopy.     Order pended Please sign

## 2019-01-17 NOTE — TELEPHONE ENCOUNTER
Endoscopy Scheduling Questionnaire:    Call Type:Outgoing call    1. Have you been admitted overnight to the hospital in the past 3 months? no  2. Do you get CP and SOB while walking up a flight of stairs? no  3. Have you had a stent placed in the past 12 months? no  4. Have you had a stroke or heart attack in the past 6 months? no  5. Have you had any chest pain in the past 3 months? no      If so, have you been evaluated by your PCP or Cardiologist? no  6. Do you take weight loss medications? no  7. Have you been diagnosed with Diverticulitis within the past 3 months? no  8. Are you having any GI symptoms that you feel need to be evaluated prior to your procedure? no  9. Are you on dialysis? no  10. Are you diabetic? no  11. Do you have any other health issues that you feel might limit your ability to safely have the procedure and/or sedation? no  12. Is the patient over 81 yo? no        If so, has the patient been seen by their PCP or GI in the last 3 months? N/A       -I have reviewed the last colonoscopy for recommendations regarding surveillance and bowel prep  Yes  -I have reviewed the patient's medications and allergies. She is not on high risk medications and will require cardiac clearance. A clearance request NA  -I have verified the pharmacy information. The prep being used is Suprep. The patient's prep instructions were sent via mail..    Date Endoscopy Scheduled: Date: 3-6-19  Or  Date Gastro office visit Scheduled: NA

## 2019-03-04 ENCOUNTER — TELEPHONE (OUTPATIENT)
Dept: ENDOSCOPY | Facility: HOSPITAL | Age: 64
End: 2019-03-04

## 2019-03-04 RX ORDER — SODIUM, POTASSIUM,MAG SULFATES 17.5-3.13G
1 SOLUTION, RECONSTITUTED, ORAL ORAL DAILY
Qty: 1 KIT | Refills: 0 | Status: ON HOLD | OUTPATIENT
Start: 2019-03-04 | End: 2019-03-07 | Stop reason: CLARIF

## 2019-03-04 NOTE — TELEPHONE ENCOUNTER
Pt called stating pharmacy did not have bowel prep ready. Suprep sent to [facility]  For signiture to pharmacy . Pharmacy in [place].

## 2019-03-05 RX ORDER — SODIUM CHLORIDE 0.9 % (FLUSH) 0.9 %
3 SYRINGE (ML) INJECTION
Status: CANCELLED | OUTPATIENT
Start: 2019-03-05

## 2019-03-06 ENCOUNTER — HOSPITAL ENCOUNTER (OUTPATIENT)
Facility: HOSPITAL | Age: 64
Discharge: HOME | End: 2019-03-06
Attending: SURGERY | Admitting: SURGERY
Payer: COMMERCIAL

## 2019-03-06 ENCOUNTER — ANESTHESIA (OUTPATIENT)
Dept: ENDOSCOPY | Facility: HOSPITAL | Age: 64
End: 2019-03-06
Payer: COMMERCIAL

## 2019-03-06 ENCOUNTER — ANESTHESIA EVENT (OUTPATIENT)
Dept: ENDOSCOPY | Facility: HOSPITAL | Age: 64
End: 2019-03-06
Payer: COMMERCIAL

## 2019-03-06 DIAGNOSIS — Z12.11 SPECIAL SCREENING FOR MALIGNANT NEOPLASMS, COLON: Primary | ICD-10-CM

## 2019-03-06 PROCEDURE — G0121 COLON CA SCRN NOT HI RSK IND: ICD-10-PCS | Mod: 53,,, | Performed by: SURGERY

## 2019-03-06 PROCEDURE — 25000003 PHARM REV CODE 250: Performed by: SURGERY

## 2019-03-06 PROCEDURE — G0121 COLON CA SCRN NOT HI RSK IND: HCPCS | Mod: 53,,, | Performed by: SURGERY

## 2019-03-06 PROCEDURE — 25000003 PHARM REV CODE 250: Performed by: NURSE ANESTHETIST, CERTIFIED REGISTERED

## 2019-03-06 PROCEDURE — 63600175 PHARM REV CODE 636 W HCPCS: Performed by: NURSE ANESTHETIST, CERTIFIED REGISTERED

## 2019-03-06 PROCEDURE — 37000009 HC ANESTHESIA EA ADD 15 MINS: Performed by: SURGERY

## 2019-03-06 PROCEDURE — 37000008 HC ANESTHESIA 1ST 15 MINUTES: Performed by: SURGERY

## 2019-03-06 PROCEDURE — G0121 COLON CA SCRN NOT HI RSK IND: HCPCS | Performed by: SURGERY

## 2019-03-06 RX ORDER — SODIUM CHLORIDE, SODIUM LACTATE, POTASSIUM CHLORIDE, CALCIUM CHLORIDE 600; 310; 30; 20 MG/100ML; MG/100ML; MG/100ML; MG/100ML
INJECTION, SOLUTION INTRAVENOUS CONTINUOUS
Status: DISCONTINUED | OUTPATIENT
Start: 2019-03-06 | End: 2019-03-06 | Stop reason: HOSPADM

## 2019-03-06 RX ORDER — PROPOFOL 10 MG/ML
VIAL (ML) INTRAVENOUS
Status: DISCONTINUED | OUTPATIENT
Start: 2019-03-06 | End: 2019-03-06

## 2019-03-06 RX ORDER — LIDOCAINE HYDROCHLORIDE 10 MG/ML
INJECTION, SOLUTION EPIDURAL; INFILTRATION; INTRACAUDAL; PERINEURAL
Status: DISCONTINUED | OUTPATIENT
Start: 2019-03-06 | End: 2019-03-06

## 2019-03-06 RX ORDER — SODIUM CHLORIDE, SODIUM LACTATE, POTASSIUM CHLORIDE, CALCIUM CHLORIDE 600; 310; 30; 20 MG/100ML; MG/100ML; MG/100ML; MG/100ML
INJECTION, SOLUTION INTRAVENOUS CONTINUOUS PRN
Status: DISCONTINUED | OUTPATIENT
Start: 2019-03-06 | End: 2019-03-06

## 2019-03-06 RX ADMIN — LIDOCAINE HYDROCHLORIDE 50 MG: 10 INJECTION, SOLUTION EPIDURAL; INFILTRATION; INTRACAUDAL; PERINEURAL at 07:03

## 2019-03-06 RX ADMIN — PROPOFOL 40 MG: 10 INJECTION, EMULSION INTRAVENOUS at 08:03

## 2019-03-06 RX ADMIN — SODIUM CHLORIDE, SODIUM LACTATE, POTASSIUM CHLORIDE, AND CALCIUM CHLORIDE: .6; .31; .03; .02 INJECTION, SOLUTION INTRAVENOUS at 07:03

## 2019-03-06 RX ADMIN — PROPOFOL 80 MG: 10 INJECTION, EMULSION INTRAVENOUS at 07:03

## 2019-03-06 RX ADMIN — SODIUM CHLORIDE, SODIUM LACTATE, POTASSIUM CHLORIDE, AND CALCIUM CHLORIDE: 600; 310; 30; 20 INJECTION, SOLUTION INTRAVENOUS at 07:03

## 2019-03-06 RX ADMIN — PROPOFOL 50 MG: 10 INJECTION, EMULSION INTRAVENOUS at 07:03

## 2019-03-06 NOTE — ANESTHESIA PREPROCEDURE EVALUATION
03/06/2019  Bonnie Vazquez is a 63 y.o., female.    Pre-op Assessment    I have reviewed the Patient Summary Reports.      I have reviewed the Medications.     Review of Systems  Anesthesia Hx:  No problems with previous Anesthesia  History of prior surgery of interest to airway management or planning: Previous anesthesia: General Denies Family Hx of Anesthesia complications.   Denies Personal Hx of Anesthesia complications.   Social:  Non-Smoker, Alcohol Use    Hematology/Oncology:     Oncology Normal    -- Anemia:   EENT/Dental:EENT/Dental Normal   Cardiovascular:  Cardiovascular Normal     Pulmonary:  Pulmonary Normal    Renal/:   renal calculi    Hepatic/GI:   Bowel Prep.    Musculoskeletal:  Musculoskeletal Normal    Neurological:  Neurology Normal    Endocrine:  Endocrine Normal    Psych:  Psychiatric Normal           Physical Exam  General:  Obesity    Airway/Jaw/Neck:  Airway Findings: Mouth Opening: Normal Tongue: Normal  General Airway Assessment: Adult  Mallampati: II  Improves to II with phonation.  TM Distance: Normal, at least 6 cm       Chest/Lungs:  Chest/Lungs Findings: Normal Respiratory Rate     Heart/Vascular:  Heart Findings: Rate: Normal        Mental Status:  Mental Status Findings:  Cooperative, Alert and Oriented         Anesthesia Plan  Type of Anesthesia, risks & benefits discussed:  Anesthesia Type:  MAC  Patient's Preference:   Intra-op Monitoring Plan: standard ASA monitors  Intra-op Monitoring Plan Comments:   Post Op Pain Control Plan:   Post Op Pain Control Plan Comments:   Induction:    Beta Blocker:  Patient is not currently on a Beta-Blocker (No further documentation required).       Informed Consent: Patient understands risks and agrees with Anesthesia plan.  Questions answered. Anesthesia consent signed with patient.  ASA Score: 2     Day of Surgery Review of  History & Physical:  There are no significant changes.

## 2019-03-06 NOTE — PLAN OF CARE
VSS, No GI bleed noted, discharge instructions provided to patient and family. Both verbalized understanding. Instructions for Miralax prep given to patient at this time. Pt verbalized understanding. Arrival time for 3/7/19 0930.

## 2019-03-06 NOTE — H&P
Short Stay Endoscopy History and Physical    PCP - Nando Hernandez, DO    Procedure - screening colonoscopy.  ASA - 1  Mallampati - per anesthesia  History of Anesthesia problems - no  Family history Anesthesia problems -  no     HPI:  This is a 63 y.o.female here for evaluation of : screening colonoscopy for average risk.     Reflux - no  Dysphagia - no  Abdominal pain - no  Diarrhea - no  Anemia - no  GI bleeding - no  Nausea and vomiting-no  Early satiety-no  aversion to sight or smell of food-no    ROS:  Constitutional: No fevers, chills, No weight loss  ENT: No allergies  CV: No chest pain  Pulm: No cough, No shortness of breath  Ophtho: No vision changes  GI: see HPI  Derm: No rash  Heme: No lymphadenopathy, No bruising  MSK: No arthritis  : No dysuria, No hematuria  Endo: No hot or cold intolerance  Neuro: No syncope, No seizure  Psych: No anxiety, No depression    Medical History:  Past Medical History:   Diagnosis Date    Anemia     G6PD deficiency     Kidney stone        Surgical History:  Past Surgical History:   Procedure Laterality Date    HYSTERECTOMY      benign    KIDNEY STONE SURGERY         Family History:  Family History   Problem Relation Age of Onset    Heart disease Mother     Cataracts Mother     Diabetes Father     Diabetes Brother     Breast cancer Neg Hx     Colon cancer Neg Hx     Ovarian cancer Neg Hx     Thrombophilia Neg Hx        Social History:  Social History     Socioeconomic History    Marital status:      Spouse name: Not on file    Number of children: 3    Years of education: Not on file    Highest education level: Not on file   Social Needs    Financial resource strain: Not on file    Food insecurity - worry: Not on file    Food insecurity - inability: Not on file    Transportation needs - medical: Not on file    Transportation needs - non-medical: Not on file   Occupational History    Occupation: Clerical     Employer: [employer]    Tobacco Use    Smoking status: Never Smoker    Smokeless tobacco: Never Used   Substance and Sexual Activity    Alcohol use: Yes     Alcohol/week: 0.0 oz     Comment: socially    Drug use: No    Sexual activity: Yes     Partners: Male     Birth control/protection: Surgical     Comment: mut monog   Other Topics Concern    Not on file   Social History Narrative    Not on file       Allergies:   Review of patient's allergies indicates:   Allergen Reactions    Codeine        Medications:   No current facility-administered medications on file prior to encounter.      Current Outpatient Medications on File Prior to Encounter   Medication Sig Dispense Refill    biotin 1 mg tablet Take 1,000 mcg by mouth 3 (three) times daily.      calcium-vitamin D3 (CALCIUM 500 + D) 500 mg(1,250mg) -200 unit per tablet Take 1 tablet by mouth 2 (two) times daily with meals.      cyanocobalamin (VITAMIN B-12) 250 MCG tablet Take 250 mcg by mouth once daily.      b complex vitamins capsule Take 1 capsule by mouth once daily.      diclofenac sodium (VOLTAREN) 1 % Gel Apply 2 g topically 4 (four) times daily. Apply to affected area. 100 g 2    fluticasone (FLONASE) 50 mcg/actuation nasal spray 1 spray (50 mcg total) by Each Nare route once daily. 16 g 5    methylPREDNISolone (MEDROL DOSEPACK) 4 mg tablet use as directed 1 Package 0       Objective Findings:    Vital Signs:  Vitals:    03/06/19 0738   BP: 130/74   Pulse: (!) 56   Resp: 18   Temp: 97.3 °F (36.3 °C)           Physical Exam:  General Appearance: Well appearing in no acute distress  Eyes:    No scleral icterus  ENT: Neck supple, Lips, mucosa, and tongue normal; teeth and gums normal  Lungs: CTA bilaterally in anterior and posterior fields, no wheezes, no crackles.  Heart:  Regular rate, S1, S2 normal, no murmurs heard.  Abdomen: Soft, non tender, non distended with normal bowel sounds. No hepatosplenomegaly, ascites, or mass.  Extremities: No clubbing, cyanosis or  edema  Skin: No rash    Labs:  Reviewed    Plan:  I have explained the risks and benefits of endoscopy procedures to the patient including but not limited to bleeding, perforation, infection, and death. The patient wishes to proceed.     Dawson Elizalde

## 2019-03-06 NOTE — ANESTHESIA RELEASE NOTE
"Anesthesia Release from PACU Note    Patient: Bonnie Vazquez    Procedure(s) Performed: Procedure(s) (LRB):  COLONOSCOPY (N/A)    Anesthesia type: MAC    Post pain: Adequate analgesia    Post assessment: no apparent anesthetic complications and tolerated procedure well    Last Vitals:   Visit Vitals  /63 (Patient Position: Lying)   Pulse 73   Temp 36.3 °C (97.3 °F) (Oral)   Resp 20   Ht 5' 1" (1.549 m)   Wt 89.4 kg (197 lb)   SpO2 100%   Breastfeeding? No   BMI 37.22 kg/m²       Post vital signs: stable    Level of consciousness: awake, alert  and oriented    Nausea/Vomiting: no nausea/no vomiting    Complications: none    Airway Patency: patent    Respiratory: unassisted, spontaneous ventilation, room air    Cardiovascular: stable and blood pressure at baseline    Hydration: euvolemic  "

## 2019-03-06 NOTE — DISCHARGE INSTRUCTIONS
Colonoscopy     A camera attached to a flexible tube with a viewing lens is used to take video pictures.     Colonoscopy is a test to view the inside of your lower digestive tract (colon and rectum). Sometimes it can show the last part of the small intestine (ileum). During the test, small pieces of tissue may be removed for testing. This is called a biopsy. Small growths, such as polyps, may also be removed.   Why is colonoscopy done?  The test is done to help look for colon cancer. And it can help find the source of abdominal pain, bleeding, and changes in bowel habits. It may be needed once a year, depending on factors such as your:  · Age  · Health history  · Family health history  · Symptoms  · Results from any prior colonoscopy  Risks and possible complications  These include:  · Bleeding               · A puncture or tear in the colon   · Risks of anesthesia  · A cancer lesion not being seen  Getting ready   To prepare for the test:  · Talk with your healthcare provider about the risks of the test (see below). Also ask your healthcare provider about alternatives to the test.  · Tell your healthcare provider about any medicines you take. Also tell him or her about any health conditions you may have.  · Make sure your rectum and colon are empty for the test. Follow the diet and bowel prep instructions exactly. If you dont, the test may need to be rescheduled.  · Plan for a friend or family member to drive you home after the test.     Colonoscopy provides an inside view of the entire colon.     You may discuss the results with your doctor right away or at a future visit.  During the test   The test is usually done in the hospital on an outpatient basis. This means you go home the same day. The procedure takes about 30 minutes. During that time:  · You are given relaxing (sedating) medicine through an IV line. You may be drowsy, or fully asleep.  · The healthcare provider will first give you a physical exam to  check for anal and rectal problems.  · Then the anus is lubricated and the scope inserted.  · If you are awake, you may have a feeling similar to needing to have a bowel movement. You may also feel pressure as air is pumped into the colon. Its OK to pass gas during the procedure.  · Biopsy, polyp removal, or other treatments may be done during the test.  After the test   You may have gas right after the test. It can help to try to pass it to help prevent later bloating. Your healthcare provider may discuss the results with you right away. Or you may need to schedule a follow-up visit to talk about the results. After the test, you can go back to your normal eating and other activities. You may be tired from the sedation and need to rest for a few hours.  Date Last Reviewed: 11/1/2016 © 2000-2017 [employer]. [address] [place] [address], [place] [address]. All rights reserved. This information is not intended as a substitute for professional medical care. Always follow your healthcare professional's instructions.

## 2019-03-06 NOTE — TRANSFER OF CARE
"Anesthesia Transfer of Care Note    Patient: Bonnie Vazquez    Procedure(s) Performed: Procedure(s) (LRB):  COLONOSCOPY (N/A)    Patient location: GI    Anesthesia Type: MAC    Transport from OR: Transported from OR on room air with adequate spontaneous ventilation    Post pain: adequate analgesia    Post assessment: no apparent anesthetic complications    Post vital signs: stable    Level of consciousness: awake    Nausea/Vomiting: no nausea/vomiting    Complications: none          Last vitals:   Visit Vitals  /63 (Patient Position: Lying)   Pulse 73   Temp 36.3 °C (97.3 °F) (Oral)   Resp 20   Ht 5' 1" (1.549 m)   Wt 89.4 kg (197 lb)   SpO2 100%   Breastfeeding? No   BMI 37.22 kg/m²     "

## 2019-03-06 NOTE — ANESTHESIA POSTPROCEDURE EVALUATION
"Anesthesia Post Evaluation    Patient: Bonnie Vazquez    Procedure(s) Performed: Procedure(s) (LRB):  COLONOSCOPY (N/A)    Final Anesthesia Type: MAC  Patient location during evaluation: GI PACU  Patient participation: Yes- Able to Participate  Level of consciousness: awake and alert  Post-procedure vital signs: reviewed and stable  Pain management: adequate  Airway patency: patent  PONV status at discharge: No PONV  Anesthetic complications: no      Cardiovascular status: blood pressure returned to baseline  Respiratory status: unassisted, spontaneous ventilation and room air  Hydration status: euvolemic  Follow-up not needed.        Visit Vitals  /63 (Patient Position: Lying)   Pulse 73   Temp 36.3 °C (97.3 °F) (Oral)   Resp 20   Ht 5' 1" (1.549 m)   Wt 89.4 kg (197 lb)   SpO2 100%   Breastfeeding? No   BMI 37.22 kg/m²       Pain/Macho Score: Macho Score: 10 (3/6/2019  8:10 AM)        "

## 2019-03-06 NOTE — DISCHARGE SUMMARY
[facility]  Short Stay  Discharge Summary    Admit Date: 3/6/2019    Discharge Date and Time: 3/6/2019      Discharge Attending Physician: Dawson Elizalde MD     Reason for Admission: screening colonoscopy    Hospital Course (synopsis of major diagnoses, care, treatment, and services provided during the course of the hospital stay): Uneventful and full recovery    Final Diagnoses:    Principal Problem: <principal problem not specified>   Secondary Diagnoses: <principal problem not specified>    Procedures Performed: Procedure(s) (LRB):  COLONOSCOPY (N/A)      Discharged Condition: good    Disposition: Home or Self Care     Diet: regular.    Discharge Condition: Stable    Follow up/Patient Instructions:    Follow-up Information     [facility] On 3/7/2019.    Specialty:  Emergency Medicine  Contact information:  [address] [place] [id number] [phone]                 Medications:      Medication List      CONTINUE taking these medications    b complex vitamins capsule     biotin 1 mg tablet     CALCIUM 500 + D 500 mg(1,250mg) -200 unit per tablet  Generic drug:  calcium-vitamin D3     diclofenac sodium 1 % Gel  Commonly known as:  VOLTAREN  Apply 2 g topically 4 (four) times daily. Apply to affected area.     fluticasone 50 mcg/actuation nasal spray  Commonly known as:  FLONASE  1 spray (50 mcg total) by Each Nare route once daily.     methylPREDNISolone 4 mg tablet  Commonly known as:  MEDROL DOSEPACK  use as directed     sodium,potassium,mag sulfates 17.5-3.13-1.6 gram Solr  Commonly known as:  SUPREP BOWEL PREP KIT  Take 177 mLs by mouth once daily. Use as directed. for 2 days     VITAMIN B-12 250 MCG tablet  Generic drug:  cyanocobalamin          Discharge Procedure Orders   Case request GI: COLONOSCOPY     Order Specific Question Answer Comments   Pre-op Diagnosis Encounter for screening colonoscopy [[id number]]    CPT Code: RI COLON W/ REM BY SNARE (OSTOMY)  [39857]    Positioning: Lateral Left [1000]    Medical Necessity: Medically Urgent [101]    Implant Required: No [1001]    CPT Code: MD COLON CA SCRN NOT HI RSK IND []      Dawson Elizalde

## 2019-03-06 NOTE — PROVATION PATIENT INSTRUCTIONS
Discharge Summary/Instructions after an Endoscopic Procedure  Patient Name: Bonnie Vazquez  Patient MRN: [id number]  Patient   YOB: 1955  Wednesday, March 06, 2019 Dawson Elizalde MD  RESTRICTIONS:  During your procedure today, you received medications for sedation.  These   medications may affect your judgment, balance and coordination.  Therefore,   for 24 hours, you have the following restrictions:   - DO NOT drive a car, operate machinery, make legal/financial decisions,   sign important papers or drink alcohol.    ACTIVITY:  Today: no heavy lifting, straining or running due to procedural   sedation/anesthesia.  The following day: return to full activity including work.  DIET:  Eat and drink normally unless instructed otherwise.     TREATMENT FOR COMMON SIDE EFFECTS:  - Mild abdominal pain, nausea, belching, bloating or excessive gas:  rest,   eat lightly and use a heating pad.  - Sore Throat: treat with throat lozenges and/or gargle with warm salt   water.  - Because air was used during the procedure, expelling large amounts of air   from your rectum or belching is normal.  - If a bowel prep was taken, you may not have a bowel movement for 1-3 days.    This is normal.  SYMPTOMS TO WATCH FOR AND REPORT TO YOUR PHYSICIAN:  1. Abdominal pain or bloating, other than gas cramps.  2. Chest pain.  3. Back pain.  4. Signs of infection such as: chills or fever occurring within 24 hours   after the procedure.  5. Rectal bleeding, which would show as bright red, maroon, or black stools.   (A tablespoon of blood from the rectum is not serious, especially if   hemorrhoids are present.)  6. Vomiting.  7. Weakness or dizziness.  GO DIRECTLY TO THE NEAREST EMERGENCY ROOM IF YOU HAVE ANY OF THE FOLLOWING:      Difficulty breathing              Chills and/or fever over 101 F   Persistent vomiting and/or vomiting blood   Severe abdominal pain   Severe chest pain   Black, tarry stools   Bleeding- more than one  tablespoon   Any other symptom or condition that you feel may need urgent attention  Your doctor recommends these additional instructions:  If any biopsies were taken, your doctors clinic will contact you in 1 to 2   weeks with any results.  - Discharge patient to home (ambulatory).   - Repeat colonoscopy tomorrow for screening purposes.   - Return to primary care physician PRN.  For questions, problems or results please call your physician Dawson Elizalde MD   at Work:  [phone]  If you have any questions about the above instructions, call the GI   department at [phone] or call the endoscopy unit at [phone]   from 7am until 3 pm.  [facility] [place] [facility], EMERGENCY ROOM PHONE NUMBER:   [phone]  IF A COMPLICATION OR EMERGENCY SITUATION ARISES AND YOU ARE UNABLE TO REACH   YOUR PHYSICIAN - GO DIRECTLY TO THE EMERGENCY ROOM.  I have read or have had read to me these discharge instructions for my   procedure and have received a written copy.  I understand these   instructions and will follow-up with my physician if I have any questions.     __________________________________       _____________________________________  Nurse Signature                                          Patient/Designated   Responsible Party Signature  Dawson Elizalde MD  3/6/2019 8:10:25 AM  This report has been verified and signed electronically.  [employer]ATION

## 2019-03-07 ENCOUNTER — ANESTHESIA (OUTPATIENT)
Dept: ENDOSCOPY | Facility: HOSPITAL | Age: 64
End: 2019-03-07
Payer: COMMERCIAL

## 2019-03-07 ENCOUNTER — ANESTHESIA EVENT (OUTPATIENT)
Dept: ENDOSCOPY | Facility: HOSPITAL | Age: 64
End: 2019-03-07
Payer: COMMERCIAL

## 2019-03-07 ENCOUNTER — HOSPITAL ENCOUNTER (OUTPATIENT)
Facility: HOSPITAL | Age: 64
Discharge: HOME | End: 2019-03-07
Attending: FAMILY MEDICINE | Admitting: FAMILY MEDICINE
Payer: COMMERCIAL

## 2019-03-07 VITALS
TEMPERATURE: 97 F | SYSTOLIC BLOOD PRESSURE: 147 MMHG | BODY MASS INDEX: 37.19 KG/M2 | WEIGHT: 197 LBS | DIASTOLIC BLOOD PRESSURE: 59 MMHG | HEART RATE: 67 BPM | RESPIRATION RATE: 18 BRPM | HEIGHT: 61 IN | OXYGEN SATURATION: 100 %

## 2019-03-07 DIAGNOSIS — K63.5 POLYP OF COLON, UNSPECIFIED PART OF COLON, UNSPECIFIED TYPE: ICD-10-CM

## 2019-03-07 DIAGNOSIS — D17.5 LIPOMA OF COLON: ICD-10-CM

## 2019-03-07 DIAGNOSIS — Z12.11 SPECIAL SCREENING FOR MALIGNANT NEOPLASMS, COLON: Primary | ICD-10-CM

## 2019-03-07 PROCEDURE — 45380 PR COLONOSCOPY,BIOPSY: ICD-10-PCS | Mod: 33,,, | Performed by: FAMILY MEDICINE

## 2019-03-07 PROCEDURE — 88305 TISSUE EXAM BY PATHOLOGIST: CPT | Mod: 26,,, | Performed by: PATHOLOGY

## 2019-03-07 PROCEDURE — 37000008 HC ANESTHESIA 1ST 15 MINUTES: Performed by: FAMILY MEDICINE

## 2019-03-07 PROCEDURE — 45380 COLONOSCOPY AND BIOPSY: CPT | Mod: 33,,, | Performed by: FAMILY MEDICINE

## 2019-03-07 PROCEDURE — 88305 TISSUE SPECIMEN TO PATHOLOGY - SURGERY: ICD-10-PCS | Mod: 26,,, | Performed by: PATHOLOGY

## 2019-03-07 PROCEDURE — 25000003 PHARM REV CODE 250: Performed by: NURSE ANESTHETIST, CERTIFIED REGISTERED

## 2019-03-07 PROCEDURE — 45380 COLONOSCOPY AND BIOPSY: CPT | Performed by: FAMILY MEDICINE

## 2019-03-07 PROCEDURE — 88305 TISSUE EXAM BY PATHOLOGIST: CPT | Performed by: PATHOLOGY

## 2019-03-07 PROCEDURE — 63600175 PHARM REV CODE 636 W HCPCS: Performed by: NURSE ANESTHETIST, CERTIFIED REGISTERED

## 2019-03-07 PROCEDURE — 27201012 HC FORCEPS, HOT/COLD, DISP: Performed by: FAMILY MEDICINE

## 2019-03-07 PROCEDURE — 25000003 PHARM REV CODE 250: Performed by: FAMILY MEDICINE

## 2019-03-07 PROCEDURE — 37000009 HC ANESTHESIA EA ADD 15 MINS: Performed by: FAMILY MEDICINE

## 2019-03-07 RX ORDER — SODIUM CHLORIDE, SODIUM LACTATE, POTASSIUM CHLORIDE, CALCIUM CHLORIDE 600; 310; 30; 20 MG/100ML; MG/100ML; MG/100ML; MG/100ML
INJECTION, SOLUTION INTRAVENOUS CONTINUOUS PRN
Status: DISCONTINUED | OUTPATIENT
Start: 2019-03-07 | End: 2019-03-07

## 2019-03-07 RX ORDER — SODIUM CHLORIDE 0.9 % (FLUSH) 0.9 %
3 SYRINGE (ML) INJECTION
Status: DISCONTINUED | OUTPATIENT
Start: 2019-03-07 | End: 2019-03-07 | Stop reason: HOSPADM

## 2019-03-07 RX ORDER — SODIUM CHLORIDE, SODIUM LACTATE, POTASSIUM CHLORIDE, CALCIUM CHLORIDE 600; 310; 30; 20 MG/100ML; MG/100ML; MG/100ML; MG/100ML
INJECTION, SOLUTION INTRAVENOUS CONTINUOUS
Status: DISCONTINUED | OUTPATIENT
Start: 2019-03-07 | End: 2019-03-07 | Stop reason: HOSPADM

## 2019-03-07 RX ORDER — LIDOCAINE HYDROCHLORIDE 20 MG/ML
INJECTION, SOLUTION EPIDURAL; INFILTRATION; INTRACAUDAL; PERINEURAL
Status: DISCONTINUED | OUTPATIENT
Start: 2019-03-07 | End: 2019-03-07

## 2019-03-07 RX ORDER — PROPOFOL 10 MG/ML
VIAL (ML) INTRAVENOUS
Status: DISCONTINUED | OUTPATIENT
Start: 2019-03-07 | End: 2019-03-07

## 2019-03-07 RX ADMIN — PROPOFOL 60 MG: 10 INJECTION, EMULSION INTRAVENOUS at 10:03

## 2019-03-07 RX ADMIN — PROPOFOL 50 MG: 10 INJECTION, EMULSION INTRAVENOUS at 10:03

## 2019-03-07 RX ADMIN — SODIUM CHLORIDE, SODIUM LACTATE, POTASSIUM CHLORIDE, AND CALCIUM CHLORIDE: .6; .31; .03; .02 INJECTION, SOLUTION INTRAVENOUS at 10:03

## 2019-03-07 RX ADMIN — PROPOFOL 40 MG: 10 INJECTION, EMULSION INTRAVENOUS at 10:03

## 2019-03-07 RX ADMIN — PROPOFOL 30 MG: 10 INJECTION, EMULSION INTRAVENOUS at 11:03

## 2019-03-07 RX ADMIN — PROPOFOL 30 MG: 10 INJECTION, EMULSION INTRAVENOUS at 10:03

## 2019-03-07 RX ADMIN — LIDOCAINE HYDROCHLORIDE 80 MG: 20 INJECTION, SOLUTION EPIDURAL; INFILTRATION; INTRACAUDAL; PERINEURAL at 10:03

## 2019-03-07 RX ADMIN — SODIUM CHLORIDE, SODIUM LACTATE, POTASSIUM CHLORIDE, AND CALCIUM CHLORIDE: 600; 310; 30; 20 INJECTION, SOLUTION INTRAVENOUS at 10:03

## 2019-03-07 NOTE — ANESTHESIA RELEASE NOTE
"Anesthesia Release from PACU Note    Patient: Bonnie Vazquez    Procedure(s) Performed: Procedure(s) (LRB):  COLONOSCOPY (N/A)    Anesthesia type: MAC    Post pain: Adequate analgesia    Post assessment: no apparent anesthetic complications and tolerated procedure well    Last Vitals:   Visit Vitals  /66   Pulse (!) 56   Temp 36.1 °C (97 °F)   Resp 16   Ht 5' 1" (1.549 m)   Wt 87.5 kg (193 lb)   SpO2 100%   Breastfeeding? No   BMI 36.47 kg/m²       Post vital signs: stable    Level of consciousness: awake, alert  and oriented    Nausea/Vomiting: no nausea/no vomiting    Complications: none    Airway Patency: patent    Respiratory: unassisted, spontaneous ventilation, room air    Cardiovascular: stable and blood pressure at baseline    Hydration: euvolemic  "

## 2019-03-07 NOTE — H&P
Short Stay Endoscopy History and Physical    PCP - Nando Hernandez, DO    Procedure - Colonoscopy  ASA - 2  Mallampati - per anesthesia  History of Anesthesia problems - no  Family history Anesthesia problems -  no     HPI:  This is a 63 y.o. female here for evaluation of :   Active Hospital Problems    Diagnosis  POA    *Special screening for malignant neoplasms, colon [Z12.11]  Not Applicable      Resolved Hospital Problems   No resolved problems to display.         Health Maintenance       Date Due Completion Date    Zoster Vaccine 10/20/2017 ---    DEXA SCAN 04/09/2018 4/9/2013 (Done)    Override on 4/9/2013: Done (REPEAT 5 YRS)    Influenza Vaccine 08/01/2018 11/15/2016 (Done)    Override on 11/15/2016: Done (at work)    Mammogram 02/06/2019 2/6/2018    Override on 2/15/2017: Done ([facility])    Override on 11/1/2013: (N/S)    Override on 11/5/2012: (N/S)    Lipid Panel 06/04/2019 6/4/2018    TETANUS VACCINE 07/13/2021 7/13/2011    Colonoscopy 03/06/2026 3/6/2019          Screening - yes-she was scoped yesterday but was not clean so she was rescheduled today.  History of polyps - no  Diarrhea - no  Anemia - no  Blood in stools - no  Abdominal pain - no  Other - no    ROS:  CONSTITUTIONAL: Denies weight change,  fatigue, fevers, chills, night sweats.  CARDIOVASCULAR: Denies chest pain, shortness of breath, orthopnea and edema.  RESPIRATORY: Denies cough, hemoptysis, dyspnea, and wheezing.  GI: See HPI.    Medical History:   Past Medical History:   Diagnosis Date    Anemia     Arthritis     G6PD deficiency     Kidney stone        Surgical History:   Past Surgical History:   Procedure Laterality Date    HYSTERECTOMY      benign    KIDNEY STONE SURGERY         Family History:   Family History   Problem Relation Age of Onset    Heart disease Mother     Cataracts Mother     Diabetes Father     Diabetes Brother     Breast cancer Neg Hx     Colon cancer Neg Hx     Ovarian cancer Neg Hx     Thrombophilia  Neg Hx        Social History:   Social History     Tobacco Use    Smoking status: Never Smoker    Smokeless tobacco: Never Used   Substance Use Topics    Alcohol use: Yes     Alcohol/week: 0.0 oz     Comment: socially    Drug use: No       Allergies:   Review of patient's allergies indicates:   Allergen Reactions    Codeine        Medications:   Current Facility-Administered Medications on File Prior to Encounter   Medication Dose Route Frequency Provider Last Rate Last Dose    [DISCONTINUED] lactated ringers infusion   Intravenous Continuous Dawson Elizalde MD 75 mL/hr at 03/06/19 0746       Current Outpatient Medications on File Prior to Encounter   Medication Sig Dispense Refill    biotin 1 mg tablet Take 1,000 mcg by mouth 3 (three) times daily.      calcium-vitamin D3 (CALCIUM 500 + D) 500 mg(1,250mg) -200 unit per tablet Take 1 tablet by mouth 2 (two) times daily with meals.      cyanocobalamin (VITAMIN B-12) 250 MCG tablet Take 250 mcg by mouth once daily.      b complex vitamins capsule Take 1 capsule by mouth once daily.      diclofenac sodium (VOLTAREN) 1 % Gel Apply 2 g topically 4 (four) times daily. Apply to affected area. 100 g 2    fluticasone (FLONASE) 50 mcg/actuation nasal spray 1 spray (50 mcg total) by Each Nare route once daily. 16 g 5    [DISCONTINUED] methylPREDNISolone (MEDROL DOSEPACK) 4 mg tablet use as directed 1 Package 0    [DISCONTINUED] sodium,potassium,mag sulfates (SUPREP BOWEL PREP KIT) 17.5-3.13-1.6 gram SolR Take 177 mLs by mouth once daily. Use as directed. for 2 days 1 kit 0       Physical Exam:  Vital Signs:   Vitals:    03/07/19 1010   BP: 133/72   Pulse: (!) 54   Resp: 20   Temp: 97.5 °F (36.4 °C)     General Appearance: Well appearing in no acute distress  ENT: OP clear  Chest: CTA B  CV: RRR, no m/r/g  Abd: s/nt/nd/nabs  Ext: no edema    Labs:Reviewed    IMP:  Active Hospital Problems    Diagnosis  POA    *Special screening for malignant neoplasms, colon [Z12.11]   Not Applicable      Resolved Hospital Problems   No resolved problems to display.         Plan:   I have explained the risks and benefits of colonoscopy to the patient including but not limited to bleeding, perforation, infection, and death. The patient wishes to proceed.

## 2019-03-07 NOTE — PROVATION PATIENT INSTRUCTIONS
Discharge Summary/Instructions after an Endoscopic Procedure  Patient Name: Bonnie Vazquez  Patient MRN: [id number]  Patient YOB: 1955  Thursday, March 07, 2019 Jerry Sawant MD  RESTRICTIONS:  During your procedure today, you received medications for sedation.  These   medications may affect your judgment, balance and coordination.  Therefore,   for 24 hours, you have the following restrictions:   - DO NOT drive a car, operate machinery, make legal/financial decisions,   sign important papers or drink alcohol.    ACTIVITY:  Today: no heavy lifting, straining or running due to procedural   sedation/anesthesia.  The following day: return to full activity including work.  DIET:  Eat and drink normally unless instructed otherwise.     TREATMENT FOR COMMON SIDE EFFECTS:  - Mild abdominal pain, nausea, belching, bloating or excessive gas:  rest,   eat lightly and use a heating pad.  - Sore Throat: treat with throat lozenges and/or gargle with warm salt   water.  - Because air was used during the procedure, expelling large amounts of air   from your rectum or belching is normal.  - If a bowel prep was taken, you may not have a bowel movement for 1-3 days.    This is normal.  SYMPTOMS TO WATCH FOR AND REPORT TO YOUR PHYSICIAN:  1. Abdominal pain or bloating, other than gas cramps.  2. Chest pain.  3. Back pain.  4. Signs of infection such as: chills or fever occurring within 24 hours   after the procedure.  5. Rectal bleeding, which would show as bright red, maroon, or black stools.   (A tablespoon of blood from the rectum is not serious, especially if   hemorrhoids are present.)  6. Vomiting.  7. Weakness or dizziness.  GO DIRECTLY TO THE NEAREST EMERGENCY ROOM IF YOU HAVE ANY OF THE FOLLOWING:      Difficulty breathing              Chills and/or fever over 101 F   Persistent vomiting and/or vomiting blood   Severe abdominal pain   Severe chest pain   Black, tarry stools   Bleeding- more than one  tablespoon   Any other symptom or condition that you feel may need urgent attention  Your doctor recommends these additional instructions:  If any biopsies were taken, your doctors clinic will contact you in 1 to 2   weeks with any results.  - Patient has a contact number available for emergencies.  The signs and   symptoms of potential delayed complications were discussed with the   patient.  Return to normal activities tomorrow.  Written discharge   instructions were provided to the patient.   - Resume previous diet.   - Continue present medications.   - Await pathology results.   - Repeat colonoscopy in 5 years for surveillance.   - Telephone my office for pathology results in 1 week.   - Discharge patient to home (via wheelchair).  For questions, problems or results please call your physician Jerry Sawant MD at Work:  [phone]  If you have any questions about the above instructions, call the GI   department at [phone] or call the endoscopy unit at [phone]   from 7am until 3 pm.  [facility], EMERGENCY ROOM PHONE NUMBER:   [phone]  IF A COMPLICATION OR EMERGENCY SITUATION ARISES AND YOU ARE UNABLE TO REACH   YOUR PHYSICIAN - GO DIRECTLY TO THE EMERGENCY ROOM.  I have read or have had read to me these discharge instructions for my   procedure and have received a written copy.  I understand these   instructions and will follow-up with my physician if I have any questions.     __________________________________       _____________________________________  Nurse Signature                                          Patient/Designated   Responsible Party Signature  Jerry Sawant MD  3/7/2019 11:07:31 AM  This report has been verified and signed electronically.  PROVATION

## 2019-03-07 NOTE — ANESTHESIA POSTPROCEDURE EVALUATION
"Anesthesia Post Evaluation    Patient: Bonnie Vazquez    Procedure(s) Performed: Procedure(s) (LRB):  COLONOSCOPY (N/A)    Final Anesthesia Type: MAC  Patient location during evaluation: GI PACU  Patient participation: Yes- Able to Participate  Level of consciousness: awake and alert and oriented  Post-procedure vital signs: reviewed and stable  Pain management: adequate  Airway patency: patent  PONV status at discharge: No PONV  Anesthetic complications: no      Cardiovascular status: hemodynamically stable  Respiratory status: spontaneous ventilation, unassisted and room air  Hydration status: euvolemic  Follow-up not needed.        Visit Vitals  /66   Pulse (!) 56   Temp 36.1 °C (97 °F)   Resp 16   Ht 5' 1" (1.549 m)   Wt 87.5 kg (193 lb)   SpO2 100%   Breastfeeding? No   BMI 36.47 kg/m²       Pain/Macho Score: Macho Score: 9 (3/7/2019 11:08 AM)        "

## 2019-03-07 NOTE — TRANSFER OF CARE
"Anesthesia Transfer of Care Note    Patient: Bonnie Vazquez    Procedure(s) Performed: Procedure(s) (LRB):  COLONOSCOPY (N/A)    Patient location: GI    Anesthesia Type: MAC    Transport from OR: Transported from OR on room air with adequate spontaneous ventilation    Post pain: adequate analgesia    Post assessment: no apparent anesthetic complications    Post vital signs: stable    Level of consciousness: awake, alert and oriented    Nausea/Vomiting: no nausea/vomiting    Complications: none    Transfer of care protocol was followed      Last vitals:   Visit Vitals  /66   Pulse (!) 56   Temp 36.1 °C (97 °F)   Resp 16   Ht 5' 1" (1.549 m)   Wt 87.5 kg (193 lb)   SpO2 100%   Breastfeeding? No   BMI 36.47 kg/m²     "

## 2019-03-07 NOTE — DISCHARGE INSTRUCTIONS

## 2019-03-07 NOTE — DISCHARGE SUMMARY
Endoscopy Discharge Summary      Admit Date: 3/7/2019    Discharge Date and Time:  3/7/2019 11:09 AM    Attending Physician: Jerry Sawant MD     Discharge Physician: Jerry Sawant MD     Principal Admitting Diagnoses: Special screening for malignant neoplasms, colon         Discharge Diagnosis: The primary encounter diagnosis was Special screening for malignant neoplasms, colon. Diagnoses of Polyp of colon, unspecified part of colon, unspecified type and Lipoma of colon were also pertinent to this visit.     Discharged Condition: Good    Indication for Admission: Special screening for malignant neoplasms, colon     Hospital Course: Patient was admitted for an inpatient procedure and tolerated the procedure well with no complications.    Significant Diagnostic Studies: Colonoscopy with cold biopsy polypectomy    Pathology (if any):  Specimen (12h ago, onward)    Start     Ordered    03/07/19 1104  Specimen to Pathology - Surgery  Once     Comments:  #1 1mm sessile rectum polyp     Start Status   03/07/19 1104 Collected (03/07/19 1107)       03/07/19 1106          Estimated Blood Loss: 1 ml.    Discussed with: patient and family.    Disposition: Home.    Follow Up/Patient Instructions:   Current Discharge Medication List      CONTINUE these medications which have NOT CHANGED    Details   biotin 1 mg tablet Take 1,000 mcg by mouth 3 (three) times daily.      calcium-vitamin D3 (CALCIUM 500 + D) 500 mg(1,250mg) -200 unit per tablet Take 1 tablet by mouth 2 (two) times daily with meals.      cyanocobalamin (VITAMIN B-12) 250 MCG tablet Take 250 mcg by mouth once daily.      b complex vitamins capsule Take 1 capsule by mouth once daily.      diclofenac sodium (VOLTAREN) 1 % Gel Apply 2 g topically 4 (four) times daily. Apply to affected area.  Qty: 100 g, Refills: 2      fluticasone (FLONASE) 50 mcg/actuation nasal spray 1 spray (50 mcg total) by Each Nare route once daily.  Qty: 16 g, Refills: 5              Discharge Procedure Orders   Diet general     Call MD for:  temperature >100.4     Call MD for:  persistent nausea and vomiting     Call MD for:  severe uncontrolled pain     Call MD for:  difficulty breathing, headache or visual disturbances     Call MD for:  redness, tenderness, or signs of infection (pain, swelling, redness, odor or green/yellow discharge around incision site)     Call MD for:  hives     Call MD for:  persistent dizziness or light-headedness     No dressing needed       Follow-up Information     Jerry Sawant MD. Call in 1 week.    Specialty:  Family Medicine  Why:  To receive pathology results.  Contact information:  [address] [facility] [address] [place] [address] [phone]

## 2019-03-08 VITALS
RESPIRATION RATE: 16 BRPM | HEART RATE: 62 BPM | WEIGHT: 193 LBS | OXYGEN SATURATION: 100 % | DIASTOLIC BLOOD PRESSURE: 67 MMHG | SYSTOLIC BLOOD PRESSURE: 118 MMHG | TEMPERATURE: 97 F | BODY MASS INDEX: 36.44 KG/M2 | HEIGHT: 61 IN

## 2019-03-08 LAB — BCS RECOMMENDATION EXT: NORMAL

## 2019-03-12 NOTE — PROGRESS NOTES
Dear Nando Hernandez, DO,    I recently cared for Bonnie Vazquez and performed an endoscopy.  Tissue was sent for pathology evaluation and I will have a letter written to ask the patient to repeat the colonoscopy in 10 years.  The pathology showed that there was only hyperplastic tissue.  Thank you for allowing me to participate in the care of your patient.  Please call me for any questions that you might have.      Dr. Jerry Sawant  [phone] cell  [phone] office      NURSING STAFF:Please  inform the patient that I reviewed the recent pathology obtained at the time of colonoscopy.    The results showed that there was hyperplastic tissue present which is benign and based on that, I recommend that the patient have a repeat colonoscopy performed in 10 years.     If the patient has MyChart, this message has been sent to them.  Confirm that they read the note.  If not, copy the information and print a letter to send to the patient at this time.  Confirm that a notation to the PCP was done.      Dear Bonnie Vazquez,    This is to inform you that I have reviewed your recent colonoscopy pathology.  The results showed that you had hyperplastic tissue present which is benign and based on that, I recommend that you have a repeat colonoscopy performed in 10 years.      Dr. Jerry Sawant  [phone]

## 2019-03-18 PROBLEM — J01.00 ACUTE NON-RECURRENT MAXILLARY SINUSITIS: Status: RESOLVED | Noted: 2018-12-17 | Resolved: 2019-03-18

## 2019-09-19 ENCOUNTER — OFFICE VISIT (OUTPATIENT)
Dept: INTERNAL MEDICINE | Facility: CLINIC | Age: 64
End: 2019-09-19
Payer: COMMERCIAL

## 2019-09-19 ENCOUNTER — LAB VISIT (OUTPATIENT)
Dept: LAB | Facility: HOSPITAL | Age: 64
End: 2019-09-19
Attending: FAMILY MEDICINE
Payer: COMMERCIAL

## 2019-09-19 VITALS
HEIGHT: 61 IN | WEIGHT: 197.56 LBS | BODY MASS INDEX: 37.3 KG/M2 | HEART RATE: 70 BPM | TEMPERATURE: 97 F | RESPIRATION RATE: 18 BRPM | DIASTOLIC BLOOD PRESSURE: 60 MMHG | SYSTOLIC BLOOD PRESSURE: 116 MMHG

## 2019-09-19 DIAGNOSIS — Z00.00 ROUTINE HEALTH MAINTENANCE: ICD-10-CM

## 2019-09-19 DIAGNOSIS — Z00.00 ROUTINE HEALTH MAINTENANCE: Primary | ICD-10-CM
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
## 2019-09-19 LAB
ALBUMIN SERPL BCP-MCNC: 3.4 G/DL (ref 3.5–5.2)
ALP SERPL-CCNC: 75 U/L (ref 55–135)
ALT SERPL W/O P-5'-P-CCNC: 12 U/L (ref 10–44)
ANION GAP SERPL CALC-SCNC: 6 MMOL/L (ref 8–16)
AST SERPL-CCNC: 13 U/L (ref 10–40)
BASOPHILS # BLD AUTO: 0.04 K/UL (ref 0–0.2)
BASOPHILS NFR BLD: 0.7 % (ref 0–1.9)
BILIRUB SERPL-MCNC: 0.3 MG/DL (ref 0.1–1)
BUN SERPL-MCNC: 13 MG/DL (ref 8–23)
CALCIUM SERPL-MCNC: 9.7 MG/DL (ref 8.7–10.5)
CHLORIDE SERPL-SCNC: 110 MMOL/L (ref 95–110)
CHOLEST SERPL-MCNC: 173 MG/DL (ref 120–199)
CHOLEST/HDLC SERPL: 4.3 {RATIO} (ref 2–5)
CO2 SERPL-SCNC: 30 MMOL/L (ref 23–29)
CREAT SERPL-MCNC: 0.8 MG/DL (ref 0.5–1.4)
DIFFERENTIAL METHOD: ABNORMAL
EOSINOPHIL # BLD AUTO: 0.2 K/UL (ref 0–0.5)
EOSINOPHIL NFR BLD: 3.6 % (ref 0–8)
ERYTHROCYTE [DISTWIDTH] IN BLOOD BY AUTOMATED COUNT: 13.7 % (ref 11.5–14.5)
EST. GFR  (AFRICAN AMERICAN): >60 ML/MIN/1.73 M^2
EST. GFR  (NON AFRICAN AMERICAN): >60 ML/MIN/1.73 M^2
ESTIMATED AVG GLUCOSE: 94 MG/DL (ref 68–131)
GLUCOSE SERPL-MCNC: 73 MG/DL (ref 70–110)
HBA1C MFR BLD HPLC: 4.9 % (ref 4–5.6)
HCT VFR BLD AUTO: 34.9 % (ref 37–48.5)
HDLC SERPL-MCNC: 40 MG/DL (ref 40–75)
HDLC SERPL: 23.1 % (ref 20–50)
HGB BLD-MCNC: 11.2 G/DL (ref 12–16)
LDLC SERPL CALC-MCNC: 122 MG/DL (ref 63–159)
LYMPHOCYTES # BLD AUTO: 1.2 K/UL (ref 1–4.8)
LYMPHOCYTES NFR BLD: 20.5 % (ref 18–48)
MCH RBC QN AUTO: 26.7 PG (ref 27–31)
MCHC RBC AUTO-ENTMCNC: 32.1 G/DL (ref 32–36)
MCV RBC AUTO: 83 FL (ref 82–98)
MONOCYTES # BLD AUTO: 0.5 K/UL (ref 0.3–1)
MONOCYTES NFR BLD: 8.6 % (ref 4–15)
NEUTROPHILS # BLD AUTO: 3.7 K/UL (ref 1.8–7.7)
NEUTROPHILS NFR BLD: 66.6 % (ref 38–73)
NONHDLC SERPL-MCNC: 133 MG/DL
PLATELET # BLD AUTO: 324 K/UL (ref 150–350)
PMV BLD AUTO: 10.2 FL (ref 9.2–12.9)
POTASSIUM SERPL-SCNC: 4.1 MMOL/L (ref 3.5–5.1)
PROT SERPL-MCNC: 7.2 G/DL (ref 6–8.4)
RBC # BLD AUTO: 4.2 M/UL (ref 4–5.4)
SODIUM SERPL-SCNC: 146 MMOL/L (ref 136–145)
TRIGL SERPL-MCNC: 55 MG/DL (ref 30–150)
WBC # BLD AUTO: 5.6 K/UL (ref 3.9–12.7)

## 2019-09-19 PROCEDURE — 3008F PR BODY MASS INDEX (BMI) DOCUMENTED: ICD-10-PCS | Mod: CPTII,S$GLB,, | Performed by: FAMILY MEDICINE

## 2019-09-19 PROCEDURE — 83036 HEMOGLOBIN GLYCOSYLATED A1C: CPT

## 2019-09-19 PROCEDURE — 90686 FLU VACCINE (QUAD) GREATER THAN OR EQUAL TO 3YO PRESERVATIVE FREE IM: ICD-10-PCS | Mod: S$GLB,,, | Performed by: FAMILY MEDICINE

## 2019-09-19 PROCEDURE — 90471 FLU VACCINE (QUAD) GREATER THAN OR EQUAL TO 3YO PRESERVATIVE FREE IM: ICD-10-PCS | Mod: S$GLB,,, | Performed by: FAMILY MEDICINE

## 2019-09-19 PROCEDURE — 99214 PR OFFICE/OUTPT VISIT, EST, LEVL IV, 30-39 MIN: ICD-10-PCS | Mod: 25,S$GLB,, | Performed by: FAMILY MEDICINE

## 2019-09-19 PROCEDURE — 99214 OFFICE O/P EST MOD 30 MIN: CPT | Mod: 25,S$GLB,, | Performed by: FAMILY MEDICINE

## 2019-09-19 PROCEDURE — 36415 COLL VENOUS BLD VENIPUNCTURE: CPT | Mod: PO

## 2019-09-19 PROCEDURE — 3008F BODY MASS INDEX DOCD: CPT | Mod: CPTII,S$GLB,, | Performed by: FAMILY MEDICINE

## 2019-09-19 PROCEDURE — 99999 PR PBB SHADOW E&M-EST. PATIENT-LVL III: ICD-10-PCS | Mod: PBBFAC,,, | Performed by: FAMILY MEDICINE

## 2019-09-19 PROCEDURE — 80061 LIPID PANEL: CPT

## 2019-09-19 PROCEDURE — 80053 COMPREHEN METABOLIC PANEL: CPT | Mod: PO

## 2019-09-19 PROCEDURE — 99999 PR PBB SHADOW E&M-EST. PATIENT-LVL III: CPT | Mod: PBBFAC,,, | Performed by: FAMILY MEDICINE

## 2019-09-19 PROCEDURE — 85025 COMPLETE CBC W/AUTO DIFF WBC: CPT | Mod: PO

## 2019-09-19 PROCEDURE — 90471 IMMUNIZATION ADMIN: CPT | Mod: S$GLB,,, | Performed by: FAMILY MEDICINE

## 2019-09-19 PROCEDURE — 90686 IIV4 VACC NO PRSV 0.5 ML IM: CPT | Mod: S$GLB,,, | Performed by: FAMILY MEDICINE

## 2019-09-19 NOTE — ASSESSMENT & PLAN NOTE
She is up-to-date on health maintenance.  Had mammogram and DEXA scan done at [facility].  We have requested the records for this.  Flu shot given today but due for routine labs which will be done.
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
## 2019-10-15 ENCOUNTER — PATIENT OUTREACH (OUTPATIENT)
Dept: ADMINISTRATIVE | Facility: HOSPITAL | Age: 64
End: 2019-10-15

## 2019-10-16 ENCOUNTER — PATIENT OUTREACH (OUTPATIENT)
Dept: ADMINISTRATIVE | Facility: HOSPITAL | Age: 64
End: 2019-10-16

## 2019-12-16 ENCOUNTER — OFFICE VISIT (OUTPATIENT)
Dept: INTERNAL MEDICINE | Facility: CLINIC | Age: 64
End: 2019-12-16
Payer: COMMERCIAL

## 2019-12-16 DIAGNOSIS — R05.9 COUGH: ICD-10-CM

## 2019-12-16 DIAGNOSIS — J00 ACUTE NASOPHARYNGITIS: Primary | ICD-10-CM

## 2019-12-16 DIAGNOSIS — R03.0 ELEVATED BP WITHOUT DIAGNOSIS OF HYPERTENSION: ICD-10-CM

## 2019-12-16 PROCEDURE — 99214 PR OFFICE/OUTPT VISIT, EST, LEVL IV, 30-39 MIN: ICD-10-PCS | Mod: S$GLB,,, | Performed by: NURSE PRACTITIONER

## 2019-12-16 PROCEDURE — 99999 PR PBB SHADOW E&M-EST. PATIENT-LVL IV: ICD-10-PCS | Mod: PBBFAC,,, | Performed by: NURSE PRACTITIONER

## 2019-12-16 PROCEDURE — 3008F PR BODY MASS INDEX (BMI) DOCUMENTED: ICD-10-PCS | Mod: CPTII,S$GLB,, | Performed by: NURSE PRACTITIONER

## 2019-12-16 PROCEDURE — 99999 PR PBB SHADOW E&M-EST. PATIENT-LVL IV: CPT | Mod: PBBFAC,,, | Performed by: NURSE PRACTITIONER

## 2019-12-16 PROCEDURE — 3008F BODY MASS INDEX DOCD: CPT | Mod: CPTII,S$GLB,, | Performed by: NURSE PRACTITIONER

## 2019-12-16 PROCEDURE — 99214 OFFICE O/P EST MOD 30 MIN: CPT | Mod: S$GLB,,, | Performed by: NURSE PRACTITIONER

## 2019-12-16 RX ORDER — CETIRIZINE HYDROCHLORIDE 10 MG/1
10 TABLET ORAL DAILY
Qty: 30 TABLET | Refills: 0 | Status: SHIPPED | OUTPATIENT
Start: 2019-12-16 | End: 2021-02-18 | Stop reason: ALTCHOICE

## 2019-12-16 RX ORDER — GUAIFENESIN/DEXTROMETHORPHAN 100-10MG/5
5 SYRUP ORAL EVERY 6 HOURS PRN
Refills: 0 | COMMUNITY
Start: 2019-12-16 | End: 2019-12-26

## 2019-12-16 NOTE — PATIENT INSTRUCTIONS
Adult Self-Care for Colds  Colds are caused by viruses. They can't be cured with antibiotics. However, you can ease symptoms and support your body's efforts to heal itself.  No matter which symptoms you have, be sure to:  · Drink plenty of fluids (water or clear soup)  · Stop smoking and drinking alcohol  · Get plenty of rest    Understand a fever  · Take your temperature several times a day. If your fever is 100.4°F (38.0°C) for more than a day, call your healthcare provider.  · Relax, lie down. Go to bed if you want. Just get off your feet and rest. Also, drink plenty of fluids to avoid dehydration.  · Take acetaminophen or a nonsteroidal anti-inflammatory agent (NSAID), such as ibuprofen.  Treat a troubled nose kindly  · Breathe steam or heated humidified air to open blocked nasal passages.  a hot shower or use a vaporizer. Be careful not to get burned by the steam.  · Saline nasal sprays and decongestant tablets help open a stuffy nose. Antihistamines can also help, but they can cause side effects such as drowsiness and drying of the eyes, nose, and mouth.  Soothe a sore throat and cough  · Gargle every 2 hours with 1/4 teaspoon of salt dissolved in 1/2 cup of warm water. Suck on throat lozenges and cough drops to moisten your throat.  · Cough medicines are available but it is unclear how well they actually work.  · Take acetaminophen or an NSAID, such as ibuprofen, to ease throat pain  Ease digestive problems  · Put fluids back into your body. Take frequent sips of clear liquids such as water or broth. Avoid drinks that have a lot of sugar in them, such as juices and sodas. These can make diarrhea worse. Older children and adults can drink sports drinks.  · As your appetite returns, you can resume your normal diet. Ask your healthcare provider if there are any foods you should avoid.  When to seek medical care  When you first notice symptoms, ask your healthcare provider if antiviral medicines are  appropriate. Antibiotics should not be taken for colds or flu. Also, call your healthcare provider if you have any of the following symptoms or if you aren't feeling better after 7 days:  · Shortness of breath  · Pain or pressure in the chest or belly (abdomen)  · Worsening symptoms, especially after a period of improvement  · Fever of 100.4°F  (38.0°C) or higher, or fever that doesn't go down with medicine  · Sudden dizziness or confusion  · Severe or continued vomiting  · Signs of dehydration, including extreme thirst, dark urine, infrequent urination, dry mouth  · Spotted, red, or very sore throat   Date Last Reviewed: 12/1/2016 © 2000-2017 [employer]. [address] [place] [address], [place] [address]. All rights reserved. This information is not intended as a substitute for professional medical care. Always follow your healthcare professional's instructions.        Viral Upper Respiratory Illness (Adult)  You have a viral upper respiratory illness (URI), which is another term for the common cold. This illness is contagious during the first few days. It is spread through the air by coughing and sneezing. It may also be spread by direct contact (touching the sick person and then touching your own eyes, nose, or mouth). Frequent handwashing will decrease risk of spread. Most viral illnesses go away within 7 to 10 days with rest and simple home remedies. Sometimes the illness may last for several weeks. Antibiotics will not kill a virus, and they are generally not prescribed for this condition.    Home care  · If symptoms are severe, rest at home for the first 2 to 3 days. When you resume activity, don't let yourself get too tired.  · Avoid being exposed to cigarette smoke (yours or others).  · You may use acetaminophen or ibuprofen to control pain and fever, unless another medicine was prescribed. (Note: If you have chronic liver or kidney disease, have ever had a stomach ulcer or gastrointestinal  bleeding, or are taking blood-thinning medicines, talk with your healthcare provider before using these medicines.) Aspirin should never be given to anyone under 18 years of age who is ill with a viral infection or fever. It may cause severe liver or brain damage.  · Your appetite may be poor, so a light diet is fine. Avoid dehydration by drinking 6 to 8 glasses of fluids per day (water, soft drinks, juices, tea, or soup). Extra fluids will help loosen secretions in the nose and lungs.  · Over-the-counter cold medicines will not shorten the length of time youre sick, but they may be helpful for the following symptoms: cough, sore throat, and nasal and sinus congestion. (Note: Do not use decongestants if you have high blood pressure.)  Follow-up care  Follow up with your healthcare provider, or as advised.  When to seek medical advice  Call your healthcare provider right away if any of these occur:  · Cough with lots of colored sputum (mucus)  · Severe headache; face, neck, or ear pain  · Difficulty swallowing due to throat pain  · Fever of 100.4°F (38°C)  Call 911, or get immediate medical care  Call emergency services right away if any of these occur:  · Chest pain, shortness of breath, wheezing, or difficulty breathing  · Coughing up blood  · Inability to swallow due to throat pain  Date Last Reviewed: 9/13/2015 © 2000-2017 [employer]. [address] [place] [address], [place] [address]. All rights reserved. This information is not intended as a substitute for professional medical care. Always follow your healthcare professional's instructions.
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
## 2019-12-17 VITALS
SYSTOLIC BLOOD PRESSURE: 148 MMHG | OXYGEN SATURATION: 98 % | BODY MASS INDEX: 37.25 KG/M2 | HEART RATE: 63 BPM | DIASTOLIC BLOOD PRESSURE: 76 MMHG | RESPIRATION RATE: 16 BRPM | WEIGHT: 197.31 LBS | HEIGHT: 61 IN | TEMPERATURE: 97 F

## 2019-12-17 PROBLEM — R03.0 ELEVATED BP WITHOUT DIAGNOSIS OF HYPERTENSION: Status: ACTIVE | Noted: 2019-12-17

## 2019-12-17 NOTE — ASSESSMENT & PLAN NOTE
Asymptomatic. Has appt with Dr. Hernandez in 1 week. Will recheck BP then since pt had taken Dayquil today, and has had previously normal BP readings.

## 2019-12-17 NOTE — ASSESSMENT & PLAN NOTE
Symptomatic tx. Do not believe abx is necessary at this time.   Adequate fluids.   Continue to use flonase and zyrtec.

## 2019-12-23 PROBLEM — Z00.00 ROUTINE HEALTH MAINTENANCE: Status: RESOLVED | Noted: 2019-09-19 | Resolved: 2019-12-23

## 2019-12-27 ENCOUNTER — OFFICE VISIT (OUTPATIENT)
Dept: INTERNAL MEDICINE | Facility: CLINIC | Age: 64
End: 2019-12-27
Payer: COMMERCIAL

## 2019-12-27 VITALS
SYSTOLIC BLOOD PRESSURE: 128 MMHG | DIASTOLIC BLOOD PRESSURE: 60 MMHG | RESPIRATION RATE: 17 BRPM | OXYGEN SATURATION: 99 % | BODY MASS INDEX: 37.3 KG/M2 | HEIGHT: 61 IN | HEART RATE: 78 BPM | WEIGHT: 197.56 LBS | TEMPERATURE: 98 F

## 2019-12-27 DIAGNOSIS — K82.8 PORCELAIN GALLBLADDER: ICD-10-CM

## 2019-12-27 DIAGNOSIS — Z12.31 ENCOUNTER FOR SCREENING MAMMOGRAM FOR BREAST CANCER: Primary | ICD-10-CM
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
## 2019-12-27 PROBLEM — J00 ACUTE NASOPHARYNGITIS: Status: RESOLVED | Noted: 2019-12-16 | Resolved: 2019-12-27

## 2019-12-27 PROCEDURE — 99999 PR PBB SHADOW E&M-EST. PATIENT-LVL IV: ICD-10-PCS | Mod: PBBFAC,,, | Performed by: FAMILY MEDICINE

## 2019-12-27 PROCEDURE — 99213 OFFICE O/P EST LOW 20 MIN: CPT | Mod: S$GLB,,, | Performed by: FAMILY MEDICINE

## 2019-12-27 PROCEDURE — 99213 PR OFFICE/OUTPT VISIT, EST, LEVL III, 20-29 MIN: ICD-10-PCS | Mod: S$GLB,,, | Performed by: FAMILY MEDICINE

## 2019-12-27 PROCEDURE — 3008F BODY MASS INDEX DOCD: CPT | Mod: CPTII,S$GLB,, | Performed by: FAMILY MEDICINE

## 2019-12-27 PROCEDURE — 3008F PR BODY MASS INDEX (BMI) DOCUMENTED: ICD-10-PCS | Mod: CPTII,S$GLB,, | Performed by: FAMILY MEDICINE

## 2019-12-27 PROCEDURE — 99999 PR PBB SHADOW E&M-EST. PATIENT-LVL IV: CPT | Mod: PBBFAC,,, | Performed by: FAMILY MEDICINE

## 2019-12-27 NOTE — PROGRESS NOTES
Subjective:       Patient ID: Bonnie Vazquez is a 64 y.o. female.    Chief Complaint: Knee Pain and Heel Pain    HPI  Came in today for routine follow-up.  She actually is not having much need nor heel pain today this is something that bothers her intermittently.  She is up-to-date on mammogram and had this done at [facility].    She brought in some imaging results from CT scan that was done which found an incidental finding of porcelain gallbladder.  She is not having any right upper quadrant pain. No nausea nor vomiting.    Family History   Problem Relation Age of Onset    Heart disease Mother     Cataracts Mother     Diabetes Father     Diabetes Brother     Breast cancer Neg Hx     Colon cancer Neg Hx     Ovarian cancer Neg Hx     Thrombophilia Neg Hx        Current Outpatient Medications:     b complex vitamins capsule, Take 1 capsule by mouth once daily., Disp: , Rfl:     biotin 1 mg tablet, Take 1,000 mcg by mouth 3 (three) times daily., Disp: , Rfl:     calcium-vitamin D3 (CALCIUM 500 + D) 500 mg(1,250mg) -200 unit per tablet, Take 1 tablet by mouth 2 (two) times daily with meals., Disp: , Rfl:     cetirizine (ZYRTEC) 10 MG tablet, Take 1 tablet (10 mg total) by mouth once daily., Disp: 30 tablet, Rfl: 0    cyanocobalamin (VITAMIN B-12) 250 MCG tablet, Take 250 mcg by mouth once daily., Disp: , Rfl:     fluticasone (FLONASE) 50 mcg/actuation nasal spray, 1 spray (50 mcg total) by Each Nare route once daily., Disp: 16 g, Rfl: 5    multivitamin capsule, Take 1 capsule by mouth once daily., Disp: , Rfl:     diclofenac sodium (VOLTAREN) 1 % Gel, Apply 2 g topically 4 (four) times daily. Apply to affected area., Disp: 100 g, Rfl: 2    Review of Systems   Constitutional: Negative for chills and fever.   Respiratory: Negative for cough and shortness of breath.    Cardiovascular: Negative for chest pain.   Gastrointestinal: Negative for abdominal pain.   Musculoskeletal: Positive for  "arthralgias.   Skin: Negative for rash.   Neurological: Negative for dizziness.       Objective:   /60 (BP Location: Left arm, Patient Position: Sitting, BP Method: Large (Automatic))   Pulse 78   Temp 97.8 °F (36.6 °C) (Tympanic)   Resp 17   Ht 5' 1" (1.549 m)   Wt 89.6 kg (197 lb 8.5 oz)   SpO2 99%   BMI 37.32 kg/m²      Physical Exam   Constitutional: She is oriented to person, place, and time. She appears well-developed and well-nourished.   HENT:   Head: Normocephalic and atraumatic.   Eyes: Conjunctivae are normal.   Cardiovascular: Normal rate.   Pulmonary/Chest: Effort normal. No respiratory distress.   Abdominal: She exhibits no distension. There is no tenderness.   Musculoskeletal: She exhibits no edema.   Neurological: She is alert and oriented to person, place, and time. Coordination normal.   Skin: Skin is warm and dry. No rash noted.   Psychiatric: She has a normal mood and affect. Her behavior is normal.   Vitals reviewed.      Assessment & Plan     Problem List Items Addressed This Visit        GI    Porcelain gallbladder    Current Assessment & Plan     Recommended consultation with General surgery to determine whether she should have prophylactic cholecystectomy.         Relevant Orders    Ambulatory Referral to General Surgery      Other Visit Diagnoses     Encounter for screening mammogram for breast cancer    -  Primary    Relevant Orders    Mammo Digital Screening Bilat            No follow-ups on file.    Disclaimer:  This note may have been prepared using voice recognition software, it may have not been extensively proofed, as such there could be errors within the text such as sound alike errors.  "

## 2019-12-27 NOTE — ASSESSMENT & PLAN NOTE
Recommended consultation with General surgery to determine whether she should have prophylactic cholecystectomy.

## 2020-01-14 ENCOUNTER — HOSPITAL ENCOUNTER (OUTPATIENT)
Dept: RADIOLOGY | Facility: HOSPITAL | Age: 65
Discharge: HOME | End: 2020-01-14
Attending: SURGERY
Payer: COMMERCIAL

## 2020-01-14 ENCOUNTER — OFFICE VISIT (OUTPATIENT)
Dept: SURGERY | Facility: CLINIC | Age: 65
End: 2020-01-14
Payer: COMMERCIAL

## 2020-01-14 ENCOUNTER — HOSPITAL ENCOUNTER (OUTPATIENT)
Dept: CARDIOLOGY | Facility: CLINIC | Age: 65
Discharge: HOME | End: 2020-01-14
Payer: COMMERCIAL

## 2020-01-14 VITALS
TEMPERATURE: 97 F | SYSTOLIC BLOOD PRESSURE: 127 MMHG | BODY MASS INDEX: 36.96 KG/M2 | HEART RATE: 65 BPM | WEIGHT: 195.75 LBS | DIASTOLIC BLOOD PRESSURE: 65 MMHG | HEIGHT: 61 IN

## 2020-01-14 DIAGNOSIS — K82.8 PORCELAIN GALLBLADDER: ICD-10-CM

## 2020-01-14 DIAGNOSIS — K82.8 PORCELAIN GALLBLADDER: Primary | ICD-10-CM

## 2020-01-14 PROCEDURE — 3008F PR BODY MASS INDEX (BMI) DOCUMENTED: ICD-10-PCS | Mod: CPTII,S$GLB,, | Performed by: SURGERY

## 2020-01-14 PROCEDURE — 99214 OFFICE O/P EST MOD 30 MIN: CPT | Mod: S$GLB,,, | Performed by: SURGERY

## 2020-01-14 PROCEDURE — 93005 EKG 12-LEAD: ICD-10-PCS | Mod: S$GLB,,, | Performed by: SURGERY

## 2020-01-14 PROCEDURE — 93010 EKG 12-LEAD: ICD-10-PCS | Mod: S$GLB,,, | Performed by: INTERNAL MEDICINE

## 2020-01-14 PROCEDURE — 99999 PR PBB SHADOW E&M-EST. PATIENT-LVL IV: ICD-10-PCS | Mod: PBBFAC,,, | Performed by: SURGERY

## 2020-01-14 PROCEDURE — 99214 PR OFFICE/OUTPT VISIT, EST, LEVL IV, 30-39 MIN: ICD-10-PCS | Mod: S$GLB,,, | Performed by: SURGERY

## 2020-01-14 PROCEDURE — 93005 ELECTROCARDIOGRAM TRACING: CPT | Mod: S$GLB,,, | Performed by: SURGERY

## 2020-01-14 PROCEDURE — 99999 PR PBB SHADOW E&M-EST. PATIENT-LVL IV: CPT | Mod: PBBFAC,,, | Performed by: SURGERY

## 2020-01-14 PROCEDURE — 71046 X-RAY EXAM CHEST 2 VIEWS: CPT | Mod: TC,PO

## 2020-01-14 PROCEDURE — 93010 ELECTROCARDIOGRAM REPORT: CPT | Mod: S$GLB,,, | Performed by: INTERNAL MEDICINE

## 2020-01-14 PROCEDURE — 71046 XR CHEST PA AND LATERAL: ICD-10-PCS | Mod: 26,,, | Performed by: RADIOLOGY

## 2020-01-14 PROCEDURE — 71046 X-RAY EXAM CHEST 2 VIEWS: CPT | Mod: 26,,, | Performed by: RADIOLOGY

## 2020-01-14 PROCEDURE — 3008F BODY MASS INDEX DOCD: CPT | Mod: CPTII,S$GLB,, | Performed by: SURGERY

## 2020-01-14 RX ORDER — LIDOCAINE HYDROCHLORIDE 10 MG/ML
1 INJECTION, SOLUTION EPIDURAL; INFILTRATION; INTRACAUDAL; PERINEURAL ONCE
Status: DISCONTINUED | OUTPATIENT
Start: 2020-01-14 | End: 2021-11-19

## 2020-01-14 RX ORDER — SODIUM CHLORIDE 9 MG/ML
INJECTION, SOLUTION INTRAVENOUS CONTINUOUS
Status: CANCELLED | OUTPATIENT
Start: 2020-01-14
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
## 2020-01-27 ENCOUNTER — TELEPHONE (OUTPATIENT)
Dept: FAMILY MEDICINE | Facility: CLINIC | Age: 65
End: 2020-01-27

## 2020-01-27 NOTE — TELEPHONE ENCOUNTER
----- Message from Yocasta Orellana sent at 1/27/2020  1:31 PM CST -----  Contact: self [phone]  Type:  Same Day Appointment Request    Caller is requesting a same day appointment.  Caller declined first available appointment listed below.    Name of Caller: Bonnie Vazquez  When is the first available appointment?01/28/20  Symptoms:aching from right hip down to toes  Best Call Back Number:[phone]  Additional Information: Pt would like to be seen after 4pm today

## 2020-01-29 ENCOUNTER — OFFICE VISIT (OUTPATIENT)
Dept: INTERNAL MEDICINE | Facility: CLINIC | Age: 65
End: 2020-01-29
Payer: COMMERCIAL

## 2020-01-29 VITALS
HEART RATE: 72 BPM | TEMPERATURE: 96 F | RESPIRATION RATE: 18 BRPM | DIASTOLIC BLOOD PRESSURE: 72 MMHG | OXYGEN SATURATION: 98 % | WEIGHT: 198.63 LBS | HEIGHT: 61 IN | BODY MASS INDEX: 37.5 KG/M2 | SYSTOLIC BLOOD PRESSURE: 138 MMHG

## 2020-01-29 DIAGNOSIS — T14.8XXA MUSCLE STRAIN: Primary | ICD-10-CM

## 2020-01-29 DIAGNOSIS — M54.41 ACUTE RIGHT-SIDED LOW BACK PAIN WITH RIGHT-SIDED SCIATICA: ICD-10-CM

## 2020-01-29 PROCEDURE — 3008F BODY MASS INDEX DOCD: CPT | Mod: CPTII,S$GLB,, | Performed by: NURSE PRACTITIONER

## 2020-01-29 PROCEDURE — 96372 THER/PROPH/DIAG INJ SC/IM: CPT | Mod: S$GLB,,, | Performed by: NURSE PRACTITIONER

## 2020-01-29 PROCEDURE — 99999 PR PBB SHADOW E&M-EST. PATIENT-LVL V: ICD-10-PCS | Mod: PBBFAC,,, | Performed by: NURSE PRACTITIONER

## 2020-01-29 PROCEDURE — 99214 PR OFFICE/OUTPT VISIT, EST, LEVL IV, 30-39 MIN: ICD-10-PCS | Mod: 25,S$GLB,, | Performed by: NURSE PRACTITIONER

## 2020-01-29 PROCEDURE — 3008F PR BODY MASS INDEX (BMI) DOCUMENTED: ICD-10-PCS | Mod: CPTII,S$GLB,, | Performed by: NURSE PRACTITIONER

## 2020-01-29 PROCEDURE — 99999 PR PBB SHADOW E&M-EST. PATIENT-LVL V: CPT | Mod: PBBFAC,,, | Performed by: NURSE PRACTITIONER

## 2020-01-29 PROCEDURE — 96372 PR INJECTION,THERAP/PROPH/DIAG2ST, IM OR SUBCUT: ICD-10-PCS | Mod: S$GLB,,, | Performed by: NURSE PRACTITIONER

## 2020-01-29 PROCEDURE — 99214 OFFICE O/P EST MOD 30 MIN: CPT | Mod: 25,S$GLB,, | Performed by: NURSE PRACTITIONER

## 2020-01-29 RX ORDER — KETOROLAC TROMETHAMINE 30 MG/ML
30 INJECTION, SOLUTION INTRAMUSCULAR; INTRAVENOUS
Status: COMPLETED | OUTPATIENT
Start: 2020-01-29 | End: 2020-01-29

## 2020-01-29 RX ORDER — MELOXICAM 7.5 MG/1
7.5 TABLET ORAL DAILY
Qty: 30 TABLET | Refills: 0 | Status: SHIPPED | OUTPATIENT
Start: 2020-01-29 | End: 2021-02-18

## 2020-01-29 RX ADMIN — KETOROLAC TROMETHAMINE 30 MG: 30 INJECTION, SOLUTION INTRAMUSCULAR; INTRAVENOUS at 03:01

## 2020-01-29 NOTE — PATIENT INSTRUCTIONS
Hip Strain    You have a strain of the muscles around the hip joint. A muscle strain is a stretching or tearing of muscle fibers. This causes pain, especially when you move that muscle. There may also be some swelling and bruising.  Home care  · Stay off the injured leg as much as possible until you can walk on it without pain. If you have a lot of pain with walking, crutches or a walker may be prescribed. These can be rented or purchased at many pharmacies and surgical or orthopedic supply stores. Follow your healthcare provider's advice regarding when to begin putting weight on that leg.  · Apply an ice pack over the injured area for 15 to 20 minutes every 3 to 6 hours. You should do this for the first 24 to 48 hours. You can make an ice pack by filling a plastic bag that seals at the top with ice cubes and then wrapping it with a thin towel. Be careful not to injure your skin with the ice treatments. Ice should never be applied directly to skin. Continue the use of ice packs for relief of pain and swelling as needed. After 48 hours, apply heat (warm shower or warm bath) for 15 to 20 minutes several times a day, or alternate ice and heat.  · You may use over-the-counter pain medicine to control pain, unless another pain medicine was prescribed. If you have chronic liver or kidney disease or ever had a stomach ulcer or GI bleeding, talk with your healthcare provider beforeusing these medicines.  · If you play sports, you may resume these activities when you are able to hop and run on the injured leg without pain.  Follow-up care  Follow up with your healthcare provider, or as advised. If your symptoms do not begin to get better after a week, more tests may be needed.  If X-rays were taken, you will be told of any new findings that may affect your care.  When to seek medical advice  Call your healthcare provider right away if any of these occur:  · Increased swelling or bruising  · Increased pain  · Losing the  ability to put weight on the injured side  Date Last Reviewed: 11/19/2015  © 0234-8799 [employer]. [address] [place] [address], [place] [address]. All rights reserved. This information is not intended as a substitute for professional medical care. Always follow your healthcare professional's instructions.        Hip Strain    You have a strain of the muscles around the hip joint. A muscle strain is a stretching or tearing of muscle fibers. This causes pain, especially when you move that muscle. There may also be some swelling and bruising.  Home care  · Stay off the injured leg as much as possible until you can walk on it without pain. If you have a lot of pain with walking, crutches or a walker may be prescribed. These can be rented or purchased at many pharmacies and surgical or orthopedic supply stores. Follow your healthcare provider's advice regarding when to begin putting weight on that leg.  · Apply an ice pack over the injured area for 15 to 20 minutes every 3 to 6 hours. You should do this for the first 24 to 48 hours. You can make an ice pack by filling a plastic bag that seals at the top with ice cubes and then wrapping it with a thin towel. Be careful not to injure your skin with the ice treatments. Ice should never be applied directly to skin. Continue the use of ice packs for relief of pain and swelling as needed. After 48 hours, apply heat (warm shower or warm bath) for 15 to 20 minutes several times a day, or alternate ice and heat.  · You may use over-the-counter pain medicine to control pain, unless another pain medicine was prescribed. If you have chronic liver or kidney disease or ever had a stomach ulcer or GI bleeding, talk with your healthcare provider beforeusing these medicines.  · If you play sports, you may resume these activities when you are able to hop and run on the injured leg without pain.  Follow-up care  Follow up with your healthcare provider, or as advised. If your  symptoms do not begin to get better after a week, more tests may be needed.  If X-rays were taken, you will be told of any new findings that may affect your care.  When to seek medical advice  Call your healthcare provider right away if any of these occur:  · Increased swelling or bruising  · Increased pain  · Losing the ability to put weight on the injured side  Date Last Reviewed: 11/19/2015  © 9255-4893 [employer]. [address] [place] [address], [place] [address]. All rights reserved. This information is not intended as a substitute for professional medical care. Always follow your healthcare professional's instructions.        Sciatica    Sciatica is a condition that causes pain in the lower back that spreads down into the buttock, hip, and leg. Sometimes the leg pain can happen without any back pain. Sciatica happens when a spinal nerve is irritated or has pressure put on it as comes out of the spinal canal in the lower back. This most often happens when a bulge or rupture of a nearby spinal disk presses on the nerve. Sciatica can also be caused by a narrowing of the spinal canal (spinal stenosis) or spasm of the muscle in the buttocks that the sciatic nerve passes through (pyriform muscle). Sciatica is also called lumbar radiculopathy.  Sciatica may begin after a sudden twisting or bending force, such as in a car accident. Or it can happen after a simple awkward movement. In either case, muscle spasm often also happens. Muscle spasm makes the pain worse.  A healthcare provider makes a diagnosis of sciatica from your symptoms and a physical exam. Unless you had an injury from a car accident or fall, you usually wont have X-rays taken at this time. This is because the nerves and disks in your back cant be seen on an X-ray. If the provider sees signs of a compressed nerve, you will need to schedule an MRI scan as an outpatient. Signs of a compressed nerve include loss of strength in a leg.  Most  sciatica gets better with medicine, exercise, and physical therapy. If your symptoms continue after at least 3 months of medical treatment, you may need surgery or injections to your lower back.  Home care  Follow these tips when caring for yourself at home:  · You may need to stay in bed the first few days. But as soon as possible, begin sitting up or walking. This will help you avoid problems that come from staying in bed for long periods.  · When in bed, try to find a position that is comfortable. A firm mattress is best. Try lying flat on your back with pillows under your knees. You can also try lying on your side with your knees bent up toward your chest and a pillow between your knees.  · Avoid sitting for long periods. This puts more stress on your lower back than standing or walking.  · Use heat from a hot shower, hot bath, or heating pad to help ease pain. Massage can also help. You can also try using an ice pack. You can make your own ice pack by putting ice cubes in a plastic bag. Wrap the bag in a thin towel. Try both heat and cold to see which works best. Use the method that feels best for 20 minutes several times a day.  · You may use acetaminophen or ibuprofen to ease pain, unless another pain medicine was prescribed. Note: If you have chronic liver or kidney disease, talk with your healthcare provider before taking these medicines. Also talk with your provider if youve had a stomach ulcer or gastrointestinal bleeding.  · Use safe lifting methods. Dont lift anything heavier than 15 pounds until all of the pain is gone.  Follow-up care  Follow up with your healthcare provider, or as advised. You may need physical therapy or additional tests.  If X-rays were taken, a radiologist will look at them. You will be told of any new findings that may affect your care.  When to seek medical advice  Call your healthcare provider right away if any of these occur:  · Pain gets worse even after taking prescribed  medicine  · Weakness or numbness in 1 or both legs or hips  · Numbness in your groin or genital area  · You cant control your bowel or bladder  · Fever  · Redness or swelling over your back or spine   Date Last Reviewed: 8/1/2016 © 2000-2017 [employer]. [address] [place] [address]. All rights reserved. This information is not intended as a substitute for professional medical care. Always follow your healthcare professional's instructions.        Step-by-Step  Safe Lifting       Date Last Reviewed: 7/9/2015 © 2000-2017 [employer]. [address] [place] [address]. All rights reserved. This information is not intended as a substitute for professional medical care. Always follow your healthcare professional's instructions.

## 2020-01-29 NOTE — PROGRESS NOTES
Subjective:       Patient ID: Bonnie Vazquez is a 64 y.o. female.    Chief Complaint: Pain to right leg (Pain to right leg extending down right leg to foot x 5 days, constant pain ) and Numbness (Right foot numbness and tingling )    Ms. Vazquez is here today c/o R sided lower back and hip pain. She reports that she was lifting on heavy objects and moving stuff around on Friday, and started with pain on Saturday, mid day.     Hip Pain    The incident occurred 5 to 7 days ago (Saturday). The incident occurred at home. The pain is present in the right thigh, right hip, right leg and right foot. Quality: throbbing. The pain is at a severity of 6/10. The pain is moderate. The pain has been intermittent (but improving) since onset. Associated symptoms include numbness (to R lower ext) and tingling. Pertinent negatives include no inability to bear weight, loss of motion, loss of sensation or muscle weakness. She reports no foreign bodies present. The symptoms are aggravated by movement, palpation and weight bearing. She has tried acetaminophen for the symptoms.       Patient Active Problem List   Diagnosis    G6PD deficiency    Anemia    Special screening for malignant neoplasms, colon    Colon polyp    Lipoma of colon    Elevated BP without diagnosis of hypertension    Porcelain gallbladder    Acute right-sided low back pain with right-sided sciatica       Family History   Problem Relation Age of Onset    Heart disease Mother     Cataracts Mother     Diabetes Father     Diabetes Brother     Breast cancer Neg Hx     Colon cancer Neg Hx     Ovarian cancer Neg Hx     Thrombophilia Neg Hx      Past Surgical History:   Procedure Laterality Date    COLONOSCOPY N/A 3/7/2019    Procedure: COLONOSCOPY;  Surgeon: Jerry Sawant MD;  Location: [facility];  Service: Endoscopy;  Laterality: N/A;    HYSTERECTOMY      benign    KIDNEY STONE SURGERY           Current Outpatient Medications:     ascorbic  acid, vitamin C, (VITAMIN C) 100 MG tablet, Take 100 mg by mouth daily as needed., Disp: , Rfl:     biotin 1 mg tablet, Take 1,000 mcg by mouth 3 (three) times daily., Disp: , Rfl:     calcium-vitamin D3 (CALCIUM 500 + D) 500 mg(1,250mg) -200 unit per tablet, Take 1 tablet by mouth 2 (two) times daily with meals., Disp: , Rfl:     cetirizine (ZYRTEC) 10 MG tablet, Take 1 tablet (10 mg total) by mouth once daily., Disp: 30 tablet, Rfl: 0    fluticasone (FLONASE) 50 mcg/actuation nasal spray, 1 spray (50 mcg total) by Each Nare route once daily., Disp: 16 g, Rfl: 5    multivitamin capsule, Take 1 capsule by mouth once daily., Disp: , Rfl:     b complex vitamins capsule, Take 1 capsule by mouth once daily., Disp: , Rfl:     cyanocobalamin (VITAMIN B-12) 250 MCG tablet, Take 250 mcg by mouth once daily., Disp: , Rfl:     diclofenac sodium (VOLTAREN) 1 % Gel, Apply 2 g topically 4 (four) times daily. Apply to affected area., Disp: 100 g, Rfl: 2    meloxicam (MOBIC) 7.5 MG tablet, Take 1 tablet (7.5 mg total) by mouth once daily., Disp: 30 tablet, Rfl: 0    Current Facility-Administered Medications:     lidocaine (PF) 10 mg/ml (1%) injection 10 mg, 1 mL, Intradermal, Once, Dawson Elizalde MD    Review of Systems   Constitutional: Negative for appetite change, chills, fatigue and fever.   Respiratory: Negative for cough, chest tightness, shortness of breath and wheezing.    Cardiovascular: Negative for chest pain and palpitations.   Gastrointestinal: Negative for abdominal pain, nausea and vomiting.   Genitourinary: Negative for dysuria, flank pain, hematuria and urgency.   Musculoskeletal: Positive for arthralgias and myalgias. Negative for back pain and gait problem.   Neurological: Positive for tingling and numbness (to R lower ext). Negative for dizziness, light-headedness and headaches.       Objective:   /72 (BP Location: Right arm, Patient Position: Sitting, BP Method: Large (Manual))   Pulse 72   Temp  "(!) 95.6 °F (35.3 °C) (Tympanic)   Resp 18   Ht 5' 1" (1.549 m)   Wt 90.1 kg (198 lb 10.2 oz)   SpO2 98%   BMI 37.53 kg/m²      Physical Exam   Constitutional: She is oriented to person, place, and time. She appears well-developed and well-nourished. No distress.   Cardiovascular: Normal rate, regular rhythm, normal heart sounds and intact distal pulses. Exam reveals no gallop and no friction rub.   No murmur heard.  Pulmonary/Chest: Effort normal and breath sounds normal. No respiratory distress. She has no wheezes.   Abdominal: Soft. There is no tenderness.   Musculoskeletal: Normal range of motion. She exhibits tenderness. She exhibits no edema.        Lumbar back: She exhibits tenderness (R side SI tenderness). She exhibits no bony tenderness and no swelling.   Uses cane with ambulation   Neurological: She is alert and oriented to person, place, and time. No cranial nerve deficit.   Skin: Skin is warm and dry. She is not diaphoretic. No erythema.       Assessment & Plan     Problem List Items Addressed This Visit        Orthopedic    Acute right-sided low back pain with right-sided sciatica    Current Assessment & Plan     NSAIDS.   Stretches.   Proper body mechanics.   Heat therapy.   Rest.         Relevant Medications    meloxicam (MOBIC) 7.5 MG tablet    ketorolac injection 30 mg (Completed)      Other Visit Diagnoses     Muscle strain    -  Primary    Relevant Medications    meloxicam (MOBIC) 7.5 MG tablet    ketorolac injection 30 mg (Completed)           Follow up if symptoms worsen or fail to improve.      "

## 2020-02-18 NOTE — PRE ADMISSION SCREENING
Pre op instructions reviewed with patient per phone:    To confirm, Your surgeon has instructed you:  Surgery is scheduled 02/20/20 at 1015.  /    Please report to [facility] 1st floor main lobby by 0845.   Pre admit office to call afternoon prior to surgery with final arrival time      INSTRUCTIONS IMPORTANT!!!  ¨ Do not eat, drink, or smoke after 12 midnight prior to surgery, including water. OK to brush teeth, no gum, candy or mints!    ¨ Take only these medicines with a small swallow of water-morning of surgery.  N/A      ____  Do not wear makeup, including mascara.  ____  No powder, lotions or creams to surgical area.  ____  Please remove all jewelry, including piercings and leave at home.  ____  No money or valuables needed. Please leave at home.  ____  Please bring identification and insurance information to hospital.  ____  If going home the same day, arrange for a ride home. You will not be able to   drive if Anesthesia was used.  ____  Children, under 12 years old, must remain in the waiting room with an adult.  They are not allowed in patient areas.  ____  Wear loose fitting clothing. Allow for dressings, bandages.  ____  Stop Aspirin, Ibuprofen, Motrin and Aleve at least 5-7 days before surgery, unless otherwise instructed by your doctor, or the nurse.   You MAY use Tylenol/acetaminophen until day of surgery.  ____  If you take diabetic medication, do not take am of surgery unless instructed by   Doctor.  ____ Stop taking any Fish Oil supplement or any Vitamins that contain Vitamin E at least 5 days prior to surgery.          Bathing Instructions-- The night before surgery and the morning prior to coming to the hospital:   -Do not shave the surgical area.   -Shower and wash your hair and body as usual with anti-bacterial  soap and shampoo.   -Rinse your hair and body completely.   -Use one packet of hibiclens to wash the surgical site (using your hand) gently for 5 minutes.  Do  not scrub you skin too hard.   -Do not use hibiclens on your head, face, or genitals.   -Do not wash with anti-bacterial soap after you use the hibiclens.   -Rinse your body thoroughly.   -Dry with clean, soft towel.  Do not use lotion, cream, deodorant, or powders on   the surgical site.    Use antibacterial soap in place of hibiclens if your surgery is on the head, face or genitals.         Surgical Site Infection    Prevention of surgical site infections:     -Keep incisions clean and dry.   -Do not soak/submerge incisions in water until completely healed.   -Do not apply lotions, powders, creams, or deodorants to site.   -Always make sure hands are cleaned with antibacterial soap/ alcohol-based   prior to touching the surgical site.  (This includes doctors, nurses, staff, and yourself.)    Signs and symptoms:   -Redness and pain around the area where you had surgery   -Drainage of cloudy fluid from your surgical wound   -Fever over 100.4  I have read or had read and explained to me, and understand the above information.

## 2020-02-20 ENCOUNTER — ANESTHESIA EVENT (OUTPATIENT)
Dept: SURGERY | Facility: HOSPITAL | Age: 65
End: 2020-02-20
Payer: COMMERCIAL

## 2020-02-20 ENCOUNTER — HOSPITAL ENCOUNTER (OUTPATIENT)
Facility: HOSPITAL | Age: 65
Discharge: HOME | End: 2020-02-21
Attending: SURGERY | Admitting: SURGERY
Payer: COMMERCIAL

## 2020-02-20 ENCOUNTER — ANESTHESIA (OUTPATIENT)
Dept: SURGERY | Facility: HOSPITAL | Age: 65
End: 2020-02-20
Payer: COMMERCIAL

## 2020-02-20 DIAGNOSIS — K82.8 PORCELAIN GALLBLADDER: Primary | ICD-10-CM

## 2020-02-20 PROCEDURE — 37000008 HC ANESTHESIA 1ST 15 MINUTES: Performed by: SURGERY

## 2020-02-20 PROCEDURE — 25000003 PHARM REV CODE 250: Performed by: SURGERY

## 2020-02-20 PROCEDURE — 47562 PR LAP,CHOLECYSTECTOMY: ICD-10-PCS | Mod: ,,, | Performed by: SURGERY

## 2020-02-20 PROCEDURE — 94760 N-INVAS EAR/PLS OXIMETRY 1: CPT

## 2020-02-20 PROCEDURE — 36000710: Performed by: SURGERY

## 2020-02-20 PROCEDURE — 27201423 OPTIME MED/SURG SUP & DEVICES STERILE SUPPLY: Performed by: SURGERY

## 2020-02-20 PROCEDURE — 88304 TISSUE EXAM BY PATHOLOGIST: CPT | Mod: 26,,, | Performed by: PATHOLOGY

## 2020-02-20 PROCEDURE — 88304 TISSUE EXAM BY PATHOLOGIST: CPT | Performed by: PATHOLOGY

## 2020-02-20 PROCEDURE — 63600175 PHARM REV CODE 636 W HCPCS: Performed by: SURGERY

## 2020-02-20 PROCEDURE — 63600175 PHARM REV CODE 636 W HCPCS: Performed by: NURSE ANESTHETIST, CERTIFIED REGISTERED

## 2020-02-20 PROCEDURE — 94799 UNLISTED PULMONARY SVC/PX: CPT

## 2020-02-20 PROCEDURE — 71000033 HC RECOVERY, INTIAL HOUR: Performed by: SURGERY

## 2020-02-20 PROCEDURE — 99900035 HC TECH TIME PER 15 MIN (STAT)

## 2020-02-20 PROCEDURE — 47562 LAPAROSCOPIC CHOLECYSTECTOMY: CPT | Mod: ,,, | Performed by: SURGERY

## 2020-02-20 PROCEDURE — 63600175 PHARM REV CODE 636 W HCPCS: Performed by: ANESTHESIOLOGY

## 2020-02-20 PROCEDURE — 88304 PR  SURG PATH,LEVEL III: ICD-10-PCS | Mod: 26,,, | Performed by: PATHOLOGY

## 2020-02-20 PROCEDURE — 25000003 PHARM REV CODE 250: Performed by: NURSE ANESTHETIST, CERTIFIED REGISTERED

## 2020-02-20 PROCEDURE — 36000711: Performed by: SURGERY

## 2020-02-20 PROCEDURE — 37000009 HC ANESTHESIA EA ADD 15 MINS: Performed by: SURGERY

## 2020-02-20 RX ORDER — MEPERIDINE HYDROCHLORIDE 25 MG/ML
12.5 INJECTION INTRAMUSCULAR; INTRAVENOUS; SUBCUTANEOUS EVERY 4 HOURS PRN
Status: DISCONTINUED | OUTPATIENT
Start: 2020-02-20 | End: 2020-02-21 | Stop reason: HOSPADM

## 2020-02-20 RX ORDER — CEFAZOLIN SODIUM 2 G/50ML
2 SOLUTION INTRAVENOUS
Status: COMPLETED | OUTPATIENT
Start: 2020-02-20 | End: 2020-02-20

## 2020-02-20 RX ORDER — FENTANYL CITRATE 50 UG/ML
INJECTION, SOLUTION INTRAMUSCULAR; INTRAVENOUS
Status: DISCONTINUED | OUTPATIENT
Start: 2020-02-20 | End: 2020-02-20

## 2020-02-20 RX ORDER — SODIUM CHLORIDE, SODIUM LACTATE, POTASSIUM CHLORIDE, CALCIUM CHLORIDE 600; 310; 30; 20 MG/100ML; MG/100ML; MG/100ML; MG/100ML
INJECTION, SOLUTION INTRAVENOUS CONTINUOUS
Status: DISCONTINUED | OUTPATIENT
Start: 2020-02-20 | End: 2020-02-21 | Stop reason: HOSPADM

## 2020-02-20 RX ORDER — ONDANSETRON 8 MG/1
8 TABLET, ORALLY DISINTEGRATING ORAL EVERY 8 HOURS PRN
Status: DISCONTINUED | OUTPATIENT
Start: 2020-02-20 | End: 2020-02-21 | Stop reason: HOSPADM

## 2020-02-20 RX ORDER — ROCURONIUM BROMIDE 10 MG/ML
INJECTION, SOLUTION INTRAVENOUS
Status: DISCONTINUED | OUTPATIENT
Start: 2020-02-20 | End: 2020-02-20

## 2020-02-20 RX ORDER — SODIUM CHLORIDE 9 MG/ML
INJECTION, SOLUTION INTRAVENOUS CONTINUOUS
Status: DISCONTINUED | OUTPATIENT
Start: 2020-02-20 | End: 2020-02-20

## 2020-02-20 RX ORDER — CHLORHEXIDINE GLUCONATE ORAL RINSE 1.2 MG/ML
10 SOLUTION DENTAL 2 TIMES DAILY
Status: DISCONTINUED | OUTPATIENT
Start: 2020-02-20 | End: 2020-02-21 | Stop reason: HOSPADM

## 2020-02-20 RX ORDER — AMOXICILLIN 250 MG
1 CAPSULE ORAL 2 TIMES DAILY
Status: DISCONTINUED | OUTPATIENT
Start: 2020-02-20 | End: 2020-02-21 | Stop reason: HOSPADM

## 2020-02-20 RX ORDER — ACETAMINOPHEN 325 MG/1
650 TABLET ORAL EVERY 6 HOURS PRN
Status: DISCONTINUED | OUTPATIENT
Start: 2020-02-20 | End: 2020-02-21 | Stop reason: HOSPADM

## 2020-02-20 RX ORDER — INDOCYANINE GREEN AND WATER 25 MG
2.5 KIT INJECTION ONCE
Status: COMPLETED | OUTPATIENT
Start: 2020-02-20 | End: 2020-02-20

## 2020-02-20 RX ORDER — ONDANSETRON 2 MG/ML
INJECTION INTRAMUSCULAR; INTRAVENOUS
Status: DISCONTINUED | OUTPATIENT
Start: 2020-02-20 | End: 2020-02-20

## 2020-02-20 RX ORDER — HYDROMORPHONE HYDROCHLORIDE 2 MG/ML
0.2 INJECTION, SOLUTION INTRAMUSCULAR; INTRAVENOUS; SUBCUTANEOUS EVERY 5 MIN PRN
Status: DISCONTINUED | OUTPATIENT
Start: 2020-02-20 | End: 2020-02-20 | Stop reason: HOSPADM

## 2020-02-20 RX ORDER — DIPHENHYDRAMINE HYDROCHLORIDE 50 MG/ML
25 INJECTION INTRAMUSCULAR; INTRAVENOUS EVERY 4 HOURS PRN
Status: DISCONTINUED | OUTPATIENT
Start: 2020-02-20 | End: 2020-02-21 | Stop reason: HOSPADM

## 2020-02-20 RX ORDER — LACTULOSE 10 G/15ML
20 SOLUTION ORAL EVERY 6 HOURS PRN
Status: DISCONTINUED | OUTPATIENT
Start: 2020-02-20 | End: 2020-02-21 | Stop reason: HOSPADM

## 2020-02-20 RX ORDER — MIDAZOLAM HYDROCHLORIDE 1 MG/ML
INJECTION, SOLUTION INTRAMUSCULAR; INTRAVENOUS
Status: DISCONTINUED | OUTPATIENT
Start: 2020-02-20 | End: 2020-02-20

## 2020-02-20 RX ORDER — ACETAMINOPHEN 10 MG/ML
1000 INJECTION, SOLUTION INTRAVENOUS ONCE
Status: COMPLETED | OUTPATIENT
Start: 2020-02-20 | End: 2020-02-20

## 2020-02-20 RX ORDER — LIDOCAINE HYDROCHLORIDE 20 MG/ML
INJECTION INTRAVENOUS
Status: DISCONTINUED | OUTPATIENT
Start: 2020-02-20 | End: 2020-02-20

## 2020-02-20 RX ORDER — ONDANSETRON 2 MG/ML
4 INJECTION INTRAMUSCULAR; INTRAVENOUS DAILY PRN
Status: DISCONTINUED | OUTPATIENT
Start: 2020-02-20 | End: 2020-02-20 | Stop reason: HOSPADM

## 2020-02-20 RX ORDER — BISACODYL 10 MG
10 SUPPOSITORY, RECTAL RECTAL DAILY PRN
Status: DISCONTINUED | OUTPATIENT
Start: 2020-02-20 | End: 2020-02-21 | Stop reason: HOSPADM

## 2020-02-20 RX ORDER — BUPIVACAINE HYDROCHLORIDE 2.5 MG/ML
INJECTION, SOLUTION EPIDURAL; INFILTRATION; INTRACAUDAL
Status: DISCONTINUED | OUTPATIENT
Start: 2020-02-20 | End: 2020-02-20 | Stop reason: HOSPADM

## 2020-02-20 RX ORDER — KETOROLAC TROMETHAMINE 30 MG/ML
15 INJECTION, SOLUTION INTRAMUSCULAR; INTRAVENOUS EVERY 6 HOURS PRN
Status: DISCONTINUED | OUTPATIENT
Start: 2020-02-20 | End: 2020-02-21 | Stop reason: HOSPADM

## 2020-02-20 RX ORDER — CLONIDINE HYDROCHLORIDE 0.1 MG/1
0.1 TABLET ORAL EVERY 6 HOURS PRN
Status: DISCONTINUED | OUTPATIENT
Start: 2020-02-20 | End: 2020-02-21 | Stop reason: HOSPADM

## 2020-02-20 RX ORDER — PROPOFOL 10 MG/ML
VIAL (ML) INTRAVENOUS
Status: DISCONTINUED | OUTPATIENT
Start: 2020-02-20 | End: 2020-02-20

## 2020-02-20 RX ORDER — KETOROLAC TROMETHAMINE 30 MG/ML
15 INJECTION, SOLUTION INTRAMUSCULAR; INTRAVENOUS EVERY 8 HOURS PRN
Status: DISCONTINUED | OUTPATIENT
Start: 2020-02-20 | End: 2020-02-20 | Stop reason: HOSPADM

## 2020-02-20 RX ORDER — SODIUM CHLORIDE, SODIUM LACTATE, POTASSIUM CHLORIDE, CALCIUM CHLORIDE 600; 310; 30; 20 MG/100ML; MG/100ML; MG/100ML; MG/100ML
INJECTION, SOLUTION INTRAVENOUS CONTINUOUS PRN
Status: DISCONTINUED | OUTPATIENT
Start: 2020-02-20 | End: 2020-02-20

## 2020-02-20 RX ADMIN — SENNOSIDES, DOCUSATE SODIUM 1 TABLET: 50; 8.6 TABLET, FILM COATED ORAL at 08:02

## 2020-02-20 RX ADMIN — SODIUM CHLORIDE, SODIUM LACTATE, POTASSIUM CHLORIDE, AND CALCIUM CHLORIDE: 600; 310; 30; 20 INJECTION, SOLUTION INTRAVENOUS at 10:02

## 2020-02-20 RX ADMIN — HYDROMORPHONE HYDROCHLORIDE 0.2 MG: 2 INJECTION INTRAMUSCULAR; INTRAVENOUS; SUBCUTANEOUS at 01:02

## 2020-02-20 RX ADMIN — ACETAMINOPHEN 1000 MG: 10 INJECTION, SOLUTION INTRAVENOUS at 01:02

## 2020-02-20 RX ADMIN — INDOCYANINE GREEN 2.5 MG: KIT INTRAVENOUS at 10:02

## 2020-02-20 RX ADMIN — PROPOFOL 100 MG: 10 INJECTION, EMULSION INTRAVENOUS at 10:02

## 2020-02-20 RX ADMIN — Medication 80 MG: at 10:02

## 2020-02-20 RX ADMIN — MIDAZOLAM 2 MG: 1 INJECTION INTRAMUSCULAR; INTRAVENOUS at 10:02

## 2020-02-20 RX ADMIN — CHLORHEXIDINE GLUCONATE 0.12% ORAL RINSE 10 ML: 1.2 LIQUID ORAL at 08:02

## 2020-02-20 RX ADMIN — KETOROLAC TROMETHAMINE 15 MG: 30 INJECTION, SOLUTION INTRAMUSCULAR; INTRAVENOUS at 01:02

## 2020-02-20 RX ADMIN — ONDANSETRON 4 MG: 2 INJECTION, SOLUTION INTRAMUSCULAR; INTRAVENOUS at 12:02

## 2020-02-20 RX ADMIN — SODIUM CHLORIDE, SODIUM LACTATE, POTASSIUM CHLORIDE, AND CALCIUM CHLORIDE: .6; .31; .03; .02 INJECTION, SOLUTION INTRAVENOUS at 02:02

## 2020-02-20 RX ADMIN — FENTANYL CITRATE 100 MCG: 50 INJECTION, SOLUTION INTRAMUSCULAR; INTRAVENOUS at 10:02

## 2020-02-20 RX ADMIN — ROCURONIUM BROMIDE 50 MG: 10 INJECTION, SOLUTION INTRAVENOUS at 10:02

## 2020-02-20 RX ADMIN — SODIUM CHLORIDE, SODIUM LACTATE, POTASSIUM CHLORIDE, AND CALCIUM CHLORIDE: 600; 310; 30; 20 INJECTION, SOLUTION INTRAVENOUS at 12:02

## 2020-02-20 RX ADMIN — CEFAZOLIN SODIUM 2 G: 2 SOLUTION INTRAVENOUS at 10:02

## 2020-02-20 RX ADMIN — PROMETHAZINE HYDROCHLORIDE 6.25 MG: 25 INJECTION INTRAMUSCULAR; INTRAVENOUS at 06:02

## 2020-02-20 RX ADMIN — KETOROLAC TROMETHAMINE 15 MG: 30 INJECTION, SOLUTION INTRAMUSCULAR at 05:02

## 2020-02-20 NOTE — TRANSFER OF CARE
"Anesthesia Transfer of Care Note    Patient: Bonnie Vazquez    Procedure(s) Performed: Procedure(s) (LRB):  XI ROBOTIC CHOLECYSTECTOMY (N/A)  LYSIS, ADHESIONS, LAPAROSCOPIC (N/A)    Patient location: PACU    Anesthesia Type: general    Transport from OR: Transported from OR on room air with adequate spontaneous ventilation    Post pain: adequate analgesia    Post assessment: no apparent anesthetic complications    Post vital signs: stable    Level of consciousness: awake    Nausea/Vomiting: no nausea/vomiting    Complications: none    Transfer of care protocol was followed      Last vitals:   Visit Vitals  /69   Pulse 83   Temp 36.4 °C (97.5 °F) (Temporal)   Resp 18   Ht 5' 1" (1.549 m)   Wt 86.7 kg (191 lb 0.5 oz)   SpO2 98%   Breastfeeding? No   BMI 36.09 kg/m²     "

## 2020-02-20 NOTE — ANESTHESIA POSTPROCEDURE EVALUATION
Anesthesia Post Evaluation    Patient: Bonnie Vazquez    Procedure(s) Performed: Procedure(s) (LRB):  XI ROBOTIC CHOLECYSTECTOMY (N/A)  LYSIS, ADHESIONS, LAPAROSCOPIC (N/A)    Final Anesthesia Type: general    Patient location during evaluation: PACU  Patient participation: Yes- Able to Participate  Level of consciousness: awake and alert  Post-procedure vital signs: reviewed and stable  Pain management: adequate  Airway patency: patent  AVA mitigation strategies: Extubation while patient is awake  PONV status at discharge: No PONV  Anesthetic complications: no      Cardiovascular status: hemodynamically stable  Respiratory status: spontaneous ventilation  Hydration status: euvolemic  Follow-up not needed.          Vitals Value Taken Time   /58 2/20/2020  2:13 PM   Temp 36.6 °C (97.8 °F) 2/20/2020  2:13 PM   Pulse 75 2/20/2020  2:13 PM   Resp 14 2/20/2020  2:13 PM   SpO2 93 % 2/20/2020  2:13 PM         Event Time     Out of Recovery 14:01:56          Pain/Macho Score: Pain Rating Prior to Med Admin: 4 (2/20/2020  1:55 PM)  Pain Rating Post Med Admin: 3 (2/20/2020  2:00 PM)  Macho Score: 9 (2/20/2020  2:00 PM)

## 2020-02-20 NOTE — OP NOTE
"Operative Note       SURGERY DATE:  02/20/2020    PRE-OP DIAGNOSIS:  Porcelain gallbladder [K82.8]    POST-OP DIAGNOSIS:  Porcelain gallbladder [K82.8]   Active Hospital Problems    Diagnosis  POA    Porcelain gallbladder [K82.8]  Yes      Resolved Hospital Problems   No resolved problems to display.       Procedure(s) (LRB):  XI ROBOTIC CHOLECYSTECTOMY (N/A)  LYSIS, ADHESIONS, LAPAROSCOPIC (N/A)    Surgeon(s) and Role:     * Dawson Elizalde MD - Primary    ASSISTANTS: None  ANESTHESIA: General    FINDINGS: Large gallstone size to be for an 0.5 cm in diameter.  The stone was non crushable.  Moderate amount of adhesion of omentum was noted at the level of umbilicus, likely due to previous laparoscopic hysterectomy.    ESTIMATED BLOOD LOSS: 5 mL              COMPLICATIONS:  None    SPECIMEN:  Gallstone.    Implants: None    INDICATION:  Porcelain gallbladder.  DESCRIPTION OF PROCEDURE:      The patient was taken to the operating room and underwent general anesthesia with orotracheal intubation. Once the patient was sleep, a "time-out" was called, the patient's ID, the site of surgery, the names of participants, and the planned surgery were confirmed prior to making the incision. A Verres needle was inserted on the left upper quadrant, and achieved pneumoperitoneum. Once gained access, insufflation was achieved up to 15 mm Hg.  Close inspection of the abdominal cavity confirmed presence of large area of omentum adherent to the anterior abdominal wall likely due to from previous laparoscopic hysterectomy.  This was needed to be taken down in order to place umbilicus for which would allow insertion of the scope. After placing 3 other ports in place, with laparoscopic scissor connected to energy source, the adhesion was taken down without any bowel injury.  Then after all four 8 mm metal trocars were positioned in a line on the abdomen toward the gall bladder. The patient was then positioned in reverse trendelenburg " position, and docking was completed. The gall bladder was retracted cephalad, and the Calot's triangle was exposed. After injection of 2.5 mg of Indocyanine Green ( 1 ml of mixture of 25 mg ICG and 10 ml of sterile water), the cystic duct, the common bile duct and artery were identified, and  The cystic duct was clipped twice distally and single proximally with Hemolock clips. This was repeated for the cystic artery as well. After the structures divided, and then gall bladder was then mobilized off the fossa. The gall bladder was then removed from the peritoneal cavity after placing in a 5 mm bag.  Due to large non crushable heavily calcified gallstone, the right lateral incision was enlarged to remove the stone even after multiple crushing attempts, the wound was then approximated with fascial closure with 0 Vicryl interrupted.  The port sites were all injected with 0.25% Marcaine and wound closed in the usual fashion with 4-0 Vicryl. The incisions were dressed with steristrips and band-aids. The patient was later awakened and extubated.           CONDITION: Good    DISPOSITION: PACU - hemodynamically stable.     Dawson Elizalde

## 2020-02-20 NOTE — ANESTHESIA PREPROCEDURE EVALUATION
02/20/2020  Bonnie Vazquez is a 64 y.o., female.    Anesthesia Evaluation    I have reviewed the Patient Summary Reports.    I have reviewed the Nursing Notes.   I have reviewed the Medications.     Review of Systems  Anesthesia Hx:  No problems with previous Anesthesia    Social:  Non-Smoker    Hematology/Oncology:         -- Anemia: Hematology Comments: G6PD deficiency   Cardiovascular:  Cardiovascular Normal  Elevated BP without diagnosis of hypertension    Sinus bradycardia  Otherwise normal ECG  No previous ECGs available  Confirmed by JUAN DOWELL MD ([id number]) on 1/14/2020 5:10:14 PM   Pulmonary:  Pulmonary Normal    Renal/:  Renal/ Normal     Hepatic/GI:  Hepatic/GI Normal    Musculoskeletal:   Acute right-sided low back pain with right-sided sciatica   Neurological:  Neurology Normal    Endocrine:  Endocrine Normal        Physical Exam  General:  Well nourished    Airway/Jaw/Neck:  Airway Findings: Mouth Opening: Normal Tongue: Normal  General Airway Assessment: Adult  Mallampati: II  TM Distance: 4 - 6 cm  Jaw/Neck Findings:  Neck ROM: Normal ROM      Dental:  Dental Findings: Edentulous   Chest/Lungs:  Chest/Lungs Findings: Clear to auscultation, Normal Respiratory Rate     Heart/Vascular:  Heart Findings: Rate: Normal  Rhythm: Regular Rhythm  Sounds: Normal        Mental Status:  Mental Status Findings:  Cooperative, Alert and Oriented         Anesthesia Plan  Type of Anesthesia, risks & benefits discussed:  Anesthesia Type:  general  Patient's Preference:   Intra-op Monitoring Plan: standard ASA monitors  Intra-op Monitoring Plan Comments:   Post Op Pain Control Plan: multimodal analgesia and per primary service following discharge from PACU  Post Op Pain Control Plan Comments:   Induction:   IV  Beta Blocker:  Patient is not currently on a Beta-Blocker (No further documentation required).        Informed Consent: Patient understands risks and agrees with Anesthesia plan.  Questions answered. Anesthesia consent signed with patient.  ASA Score: 2     Day of Surgery Review of History & Physical:  There are no significant changes.          Ready For Surgery From Anesthesia Perspective.     Patient Active Problem List   Diagnosis    G6PD deficiency    Anemia    Special screening for malignant neoplasms, colon    Colon polyp    Lipoma of colon    Elevated BP without diagnosis of hypertension    Porcelain gallbladder    Acute right-sided low back pain with right-sided sciatica       Chemistry        Component Value Date/Time     01/14/2020 1113    K 5.0 01/14/2020 1113     01/14/2020 1113    CO2 32 (H) 01/14/2020 1113    BUN 12 01/14/2020 1113    CREATININE 0.7 01/14/2020 1113    GLU 82 01/14/2020 1113        Component Value Date/Time    CALCIUM 9.8 01/14/2020 1113    ALKPHOS 90 01/14/2020 1113    AST 14 01/14/2020 1113    ALT 13 01/14/2020 1113    BILITOT 0.4 01/14/2020 1113    ESTGFRAFRICA >60.0 01/14/2020 1113    EGFRNONAA >60.0 01/14/2020 1113        Lab Results   Component Value Date    WBC 6.87 01/14/2020    HGB 11.2 (L) 01/14/2020    HCT 35.6 (L) 01/14/2020    MCV 83 01/14/2020     01/14/2020

## 2020-02-20 NOTE — PLAN OF CARE
Pt AAOX3, ambulated w/o difficulty, no s/s pain/discomfort. Pre-op checklist done. Side effects of anesthesia and expectations during recovery discussed. Pt verbalized understanding. Questions answered. Will cont to monitor.

## 2020-02-20 NOTE — ANESTHESIA RELEASE NOTE
"Anesthesia Release from PACU Note    Patient: Bonnie Vazquez    Procedure(s) Performed: Procedure(s) (LRB):  XI ROBOTIC CHOLECYSTECTOMY (N/A)  LYSIS, ADHESIONS, LAPAROSCOPIC (N/A)    Anesthesia type: general    Post pain: Adequate analgesia    Post assessment: no apparent anesthetic complications    Last Vitals:   Visit Vitals  /69   Pulse 83   Temp 36.4 °C (97.5 °F) (Temporal)   Resp 18   Ht 5' 1" (1.549 m)   Wt 86.7 kg (191 lb 0.5 oz)   SpO2 98%   Breastfeeding? No   BMI 36.09 kg/m²       Post vital signs: stable    Level of consciousness: awake    Nausea/Vomiting: no nausea/no vomiting    Complications: none    Airway Patency: patent    Respiratory: unassisted    Cardiovascular: stable and blood pressure at baseline    Hydration: euvolemic  "

## 2020-02-20 NOTE — INTERVAL H&P NOTE
The patient has been examined and the H&P has been reviewed:    I concur with the findings and no changes have occurred since H&P was written.    Anesthesia/Surgery risks, benefits and alternative options discussed and understood by patient/family.          Active Hospital Problems    Diagnosis  POA    Porcelain gallbladder [K82.8]  Yes      Resolved Hospital Problems   No resolved problems to display.
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
## 2020-02-21 VITALS
RESPIRATION RATE: 18 BRPM | HEIGHT: 61 IN | TEMPERATURE: 98 F | WEIGHT: 191 LBS | BODY MASS INDEX: 36.06 KG/M2 | OXYGEN SATURATION: 97 % | DIASTOLIC BLOOD PRESSURE: 61 MMHG | HEART RATE: 70 BPM | SYSTOLIC BLOOD PRESSURE: 141 MMHG

## 2020-02-21 LAB
ANION GAP SERPL CALC-SCNC: 3 MMOL/L (ref 8–16)
BUN SERPL-MCNC: 13 MG/DL (ref 8–23)
CALCIUM SERPL-MCNC: 8.1 MG/DL (ref 8.7–10.5)
CHLORIDE SERPL-SCNC: 108 MMOL/L (ref 95–110)
CO2 SERPL-SCNC: 31 MMOL/L (ref 23–29)
CREAT SERPL-MCNC: 0.9 MG/DL (ref 0.5–1.4)
ERYTHROCYTE [DISTWIDTH] IN BLOOD BY AUTOMATED COUNT: 13.4 % (ref 11.5–14.5)
EST. GFR  (AFRICAN AMERICAN): >60 ML/MIN/1.73 M^2
EST. GFR  (NON AFRICAN AMERICAN): >60 ML/MIN/1.73 M^2
GLUCOSE SERPL-MCNC: 101 MG/DL (ref 70–110)
HCT VFR BLD AUTO: 29 % (ref 37–48.5)
HGB BLD-MCNC: 9.2 G/DL (ref 12–16)
MCH RBC QN AUTO: 26.7 PG (ref 27–31)
MCHC RBC AUTO-ENTMCNC: 31.7 G/DL (ref 32–36)
MCV RBC AUTO: 84 FL (ref 82–98)
PLATELET # BLD AUTO: 212 K/UL (ref 150–350)
PMV BLD AUTO: 10.9 FL (ref 9.2–12.9)
POTASSIUM SERPL-SCNC: 4.1 MMOL/L (ref 3.5–5.1)
RBC # BLD AUTO: 3.44 M/UL (ref 4–5.4)
SODIUM SERPL-SCNC: 142 MMOL/L (ref 136–145)
WBC # BLD AUTO: 4.23 K/UL (ref 3.9–12.7)

## 2020-02-21 PROCEDURE — 94799 UNLISTED PULMONARY SVC/PX: CPT

## 2020-02-21 PROCEDURE — 80048 BASIC METABOLIC PNL TOTAL CA: CPT

## 2020-02-21 PROCEDURE — 25000003 PHARM REV CODE 250: Performed by: SURGERY

## 2020-02-21 PROCEDURE — 63600175 PHARM REV CODE 636 W HCPCS: Performed by: SURGERY

## 2020-02-21 PROCEDURE — 36415 COLL VENOUS BLD VENIPUNCTURE: CPT

## 2020-02-21 PROCEDURE — 94760 N-INVAS EAR/PLS OXIMETRY 1: CPT

## 2020-02-21 PROCEDURE — 85027 COMPLETE CBC AUTOMATED: CPT

## 2020-02-21 RX ORDER — HYDROCODONE BITARTRATE AND ACETAMINOPHEN 5; 325 MG/1; MG/1
1 TABLET ORAL EVERY 6 HOURS PRN
Qty: 20 TABLET | Refills: 0 | Status: SHIPPED | OUTPATIENT
Start: 2020-02-21 | End: 2020-03-02

## 2020-02-21 RX ADMIN — SENNOSIDES, DOCUSATE SODIUM 1 TABLET: 50; 8.6 TABLET, FILM COATED ORAL at 08:02

## 2020-02-21 RX ADMIN — KETOROLAC TROMETHAMINE 15 MG: 30 INJECTION, SOLUTION INTRAMUSCULAR at 08:02

## 2020-02-21 RX ADMIN — SODIUM CHLORIDE, SODIUM LACTATE, POTASSIUM CHLORIDE, AND CALCIUM CHLORIDE: .6; .31; .03; .02 INJECTION, SOLUTION INTRAVENOUS at 02:02

## 2020-02-21 RX ADMIN — CHLORHEXIDINE GLUCONATE 0.12% ORAL RINSE 10 ML: 1.2 LIQUID ORAL at 08:02

## 2020-02-21 RX ADMIN — KETOROLAC TROMETHAMINE 15 MG: 30 INJECTION, SOLUTION INTRAMUSCULAR at 02:02

## 2020-02-21 NOTE — NURSING
Pt was complaining of cough. Went over use of IS and importance of using it every hour when awake doing 10 reps to help keep air alveoli open after surgery. Pt verbalized understanding and tolerating well.

## 2020-02-21 NOTE — PLAN OF CARE
Pt remains free from falls/injuries this shift. Safety precautions maintained. Pain managed with IV medication. IV fluids maintained. No s/s of acute distress noted. Will continue to monitor. 12 hour chart check completed.

## 2020-02-21 NOTE — NURSING
Pt being discharged home in stable condition. IV removed by student RN. Catheter intact. Waiting for ride to get here with purse to call pharmacy for medication delivery, instructed to call when she arrives.

## 2020-02-21 NOTE — PLAN OF CARE
Chart reviewed, pt resting in bed with call light and personal belongings within reach. Vitals stable. LR infusing at 100ml/hr. Pain controlled with ordered meds in mar. Incision dressings intact, absent of any drainage. Pt ambulates to restroom and turns in bed independently. Pt absent of injury and complains. Will continue to monitor.

## 2020-02-26 NOTE — DISCHARGE SUMMARY
[facility]  General Surgery  Discharge Summary      Patient Name: Bonnie Vazquez  MRN: [id number]  Admission Date: 2/20/2020  Hospital Length of Stay: 0 days  Discharge Date and Time: 2/21/2020 12:07 PM  Attending Physician: No att. providers found   Discharging Provider: Chelly Gong PA-C  Primary Care Provider: Nando Hernandez DO    HPI:   No notes on file    Procedure(s) (LRB):  XI ROBOTIC CHOLECYSTECTOMY (N/A)  LYSIS, ADHESIONS, LAPAROSCOPIC (N/A)      Indwelling Lines/Drains at time of discharge:   Lines/Drains/Airways     None               Hospital Course:she udnerwent robotic cholecystectomy. On POD1 she tolerated a regular diet. Her pain was controlled. She edna examined and found to be fit for discharge.   Consults:     Significant Diagnostic Studies: Labs: reviewed    Pending Diagnostic Studies:     Procedure Component Value Units Date/Time    Specimen to Pathology, Surgery General Surgery [[id number]] Collected:  02/20/20 1238    Order Status:  Sent Lab Status:  In process Updated:  02/20/20 1408        Final Active Diagnoses:    Diagnosis Date Noted POA    PRINCIPAL PROBLEM:  Porcelain gallbladder [K82.8] 12/27/2019 Yes      Problems Resolved During this Admission:      Discharged Condition: good    Disposition: Home or Self Care    Follow Up:  Follow-up Information     Chelly Gong PA-C In 2 weeks.    Specialty:  General Surgery  Why:  post op or Dr Elizalde  Contact information:  [address] [place] [address] DR Bryanna LEWIS [phone]  [phone]                 Patient Instructions:      Diet Adult Regular     Lifting restrictions   Order Comments: 20lb limit     No driving until:   Order Comments: No longer taking narcotic pain medication     Notify your health care provider if you experience any of the following:  temperature >100.4     Notify your health care provider if you experience any of the following:  persistent nausea and vomiting or diarrhea     Notify your health care  provider if you experience any of the following:  severe uncontrolled pain     Notify your health care provider if you experience any of the following:  redness, tenderness, or signs of infection (pain, swelling, redness, odor or green/yellow discharge around incision site)     Notify your health care provider if you experience any of the following:  difficulty breathing or increased cough     Remove dressing in 24 hours     Activity as tolerated     Medications:  Reconciled Home Medications:      Medication List      START taking these medications    HYDROcodone-acetaminophen 5-325 mg per tablet  Commonly known as:  NORCO  Take 1 tablet by mouth every 6 (six) hours as needed for Pain.     nozaseptin  nasal   Commonly known as:  NOZIN  1 each by Each Nostril route 2 (two) times daily.        CHANGE how you take these medications    cetirizine 10 MG tablet  Commonly known as:  ZYRTEC  Take 1 tablet (10 mg total) by mouth once daily.  What changed:    · when to take this  · reasons to take this     fluticasone propionate 50 mcg/actuation nasal spray  Commonly known as:  FLONASE  1 spray (50 mcg total) by Each Nare route once daily.  What changed:    · when to take this  · reasons to take this        CONTINUE taking these medications    b complex vitamins capsule  Take 1 capsule by mouth once daily.     biotin 1 mg tablet  Take 1,000 mcg by mouth 3 (three) times daily.     Calcium 500 + D 500 mg(1,250mg) -200 unit per tablet  Generic drug:  calcium-vitamin D3  Take 1 tablet by mouth 2 (two) times daily with meals.     diclofenac sodium 1 % Gel  Commonly known as:  Voltaren  Apply 2 g topically 4 (four) times daily. Apply to affected area.     meloxicam 7.5 MG tablet  Commonly known as:  MOBIC  Take 1 tablet (7.5 mg total) by mouth once daily.     multivitamin capsule  Take 1 capsule by mouth once daily.     VICKS NYQUIL COLD/FLU (CPM) ORAL  Take by mouth nightly as needed.     Vitamin B-12 250 MCG  tablet  Generic drug:  cyanocobalamin  Take 250 mcg by mouth once daily.     Vitamin C 100 MG tablet  Generic drug:  ascorbic acid (vitamin C)  Take 100 mg by mouth daily as needed.          Time spent on the discharge of patient: 20 minutes    Chelly Gong PA-C  General Surgery  [facility]

## 2020-03-04 LAB
FINAL PATHOLOGIC DIAGNOSIS: NORMAL
GROSS: NORMAL

## 2020-03-10 ENCOUNTER — OFFICE VISIT (OUTPATIENT)
Dept: SURGERY | Facility: CLINIC | Age: 65
End: 2020-03-10
Payer: COMMERCIAL

## 2020-03-10 VITALS
DIASTOLIC BLOOD PRESSURE: 79 MMHG | HEIGHT: 61 IN | BODY MASS INDEX: 35.71 KG/M2 | TEMPERATURE: 98 F | WEIGHT: 189.13 LBS | SYSTOLIC BLOOD PRESSURE: 139 MMHG | HEART RATE: 62 BPM

## 2020-03-10 DIAGNOSIS — Z90.49 S/P LAPAROSCOPIC CHOLECYSTECTOMY: Primary | ICD-10-CM

## 2020-03-10 PROCEDURE — 99024 POSTOP FOLLOW-UP VISIT: CPT | Mod: S$GLB,,, | Performed by: SURGERY

## 2020-03-10 PROCEDURE — 99999 PR PBB SHADOW E&M-EST. PATIENT-LVL III: ICD-10-PCS | Mod: PBBFAC,,, | Performed by: SURGERY

## 2020-03-10 PROCEDURE — 99999 PR PBB SHADOW E&M-EST. PATIENT-LVL III: CPT | Mod: PBBFAC,,, | Performed by: SURGERY

## 2020-03-10 PROCEDURE — 99024 PR POST-OP FOLLOW-UP VISIT: ICD-10-PCS | Mod: S$GLB,,, | Performed by: SURGERY

## 2020-03-10 NOTE — PROGRESS NOTES
Bonnie Vazquez 64 y.o.female  This patient was seen for postoperative visit. Bonnie Vazquez has no specific complaints and denies of any issues relevant to the surgery. The wound has healed without any complications.  I have discussed the pathology result with the patient. Bonnie Vazquez will follow up as needed.     Dawson Elizalde

## 2020-05-12 ENCOUNTER — PATIENT OUTREACH (OUTPATIENT)
Dept: ADMINISTRATIVE | Facility: HOSPITAL | Age: 65
End: 2020-05-12

## 2020-10-30 ENCOUNTER — PATIENT OUTREACH (OUTPATIENT)
Dept: ADMINISTRATIVE | Facility: OTHER | Age: 65
End: 2020-10-30

## 2020-12-21 ENCOUNTER — PATIENT OUTREACH (OUTPATIENT)
Dept: ADMINISTRATIVE | Facility: HOSPITAL | Age: 65
End: 2020-12-21

## 2021-02-02 ENCOUNTER — HOSPITAL ENCOUNTER (EMERGENCY)
Facility: HOSPITAL | Age: 66
Discharge: HOME | End: 2021-02-02
Attending: EMERGENCY MEDICINE
Payer: COMMERCIAL

## 2021-02-02 VITALS
BODY MASS INDEX: 38.26 KG/M2 | TEMPERATURE: 99 F | OXYGEN SATURATION: 99 % | HEART RATE: 85 BPM | DIASTOLIC BLOOD PRESSURE: 74 MMHG | RESPIRATION RATE: 16 BRPM | SYSTOLIC BLOOD PRESSURE: 166 MMHG | HEIGHT: 61 IN | WEIGHT: 202.63 LBS

## 2021-02-02 DIAGNOSIS — R43.0 LOSS OF SENSE OF SMELL: ICD-10-CM

## 2021-02-02 DIAGNOSIS — U07.1 COVID-19: Primary | ICD-10-CM

## 2021-02-02 DIAGNOSIS — R03.0 ELEVATED BLOOD PRESSURE READING: ICD-10-CM

## 2021-02-02 DIAGNOSIS — R51.9 ACUTE NONINTRACTABLE HEADACHE, UNSPECIFIED HEADACHE TYPE: ICD-10-CM

## 2021-02-02 LAB — SARS-COV-2 RDRP RESP QL NAA+PROBE: POSITIVE

## 2021-02-02 PROCEDURE — U0002 COVID-19 LAB TEST NON-CDC: HCPCS | Mod: ER

## 2021-02-02 PROCEDURE — 99282 EMERGENCY DEPT VISIT SF MDM: CPT | Mod: ER

## 2021-02-03 DIAGNOSIS — U07.1 COVID-19 VIRUS DETECTED: ICD-10-CM

## 2021-02-18 ENCOUNTER — OFFICE VISIT (OUTPATIENT)
Dept: INTERNAL MEDICINE | Facility: CLINIC | Age: 66
End: 2021-02-18
Payer: COMMERCIAL

## 2021-02-18 DIAGNOSIS — Z86.16 HISTORY OF COVID-19: ICD-10-CM

## 2021-02-18 PROCEDURE — 99212 PR OFFICE/OUTPT VISIT, EST, LEVL II, 10-19 MIN: ICD-10-PCS | Mod: 95,,, | Performed by: NURSE PRACTITIONER

## 2021-02-18 PROCEDURE — 99212 OFFICE O/P EST SF 10 MIN: CPT | Mod: 95,,, | Performed by: NURSE PRACTITIONER

## 2021-03-18 ENCOUNTER — IMMUNIZATION (OUTPATIENT)
Dept: PRIMARY CARE CLINIC | Facility: CLINIC | Age: 66
End: 2021-03-18

## 2021-03-18 DIAGNOSIS — Z23 NEED FOR VACCINATION: Primary | ICD-10-CM

## 2021-03-18 PROCEDURE — 91301 COVID-19, MRNA, LNP-S, PF, 100 MCG/0.5 ML DOSE VACCINE: CPT | Mod: S$GLB,,, | Performed by: FAMILY MEDICINE

## 2021-03-18 PROCEDURE — 0011A COVID-19, MRNA, LNP-S, PF, 100 MCG/0.5 ML DOSE VACCINE: CPT | Mod: CV19,S$GLB,, | Performed by: FAMILY MEDICINE

## 2021-03-18 PROCEDURE — 91301 COVID-19, MRNA, LNP-S, PF, 100 MCG/0.5 ML DOSE VACCINE: ICD-10-PCS | Mod: S$GLB,,, | Performed by: FAMILY MEDICINE

## 2021-03-18 PROCEDURE — 0011A COVID-19, MRNA, LNP-S, PF, 100 MCG/0.5 ML DOSE VACCINE: ICD-10-PCS | Mod: CV19,S$GLB,, | Performed by: FAMILY MEDICINE

## 2021-04-15 ENCOUNTER — IMMUNIZATION (OUTPATIENT)
Dept: PRIMARY CARE CLINIC | Facility: CLINIC | Age: 66
End: 2021-04-15

## 2021-04-15 DIAGNOSIS — Z23 NEED FOR VACCINATION: Primary | ICD-10-CM

## 2021-04-15 PROCEDURE — 91301 COVID-19, MRNA, LNP-S, PF, 100 MCG/0.5 ML DOSE VACCINE: CPT | Mod: S$GLB,,, | Performed by: FAMILY MEDICINE

## 2021-04-15 PROCEDURE — 0012A COVID-19, MRNA, LNP-S, PF, 100 MCG/0.5 ML DOSE VACCINE: CPT | Mod: CV19,S$GLB,, | Performed by: FAMILY MEDICINE

## 2021-04-15 PROCEDURE — 0012A COVID-19, MRNA, LNP-S, PF, 100 MCG/0.5 ML DOSE VACCINE: ICD-10-PCS | Mod: CV19,S$GLB,, | Performed by: FAMILY MEDICINE

## 2021-04-15 PROCEDURE — 91301 COVID-19, MRNA, LNP-S, PF, 100 MCG/0.5 ML DOSE VACCINE: ICD-10-PCS | Mod: S$GLB,,, | Performed by: FAMILY MEDICINE

## 2021-06-11 ENCOUNTER — TELEPHONE (OUTPATIENT)
Dept: INTERNAL MEDICINE | Facility: CLINIC | Age: 66
End: 2021-06-11

## 2021-09-07 LAB — BCS RECOMMENDATION EXT: NORMAL

## 2021-11-19 ENCOUNTER — OFFICE VISIT (OUTPATIENT)
Dept: INTERNAL MEDICINE | Facility: CLINIC | Age: 66
End: 2021-11-19
Payer: COMMERCIAL

## 2021-11-19 VITALS
OXYGEN SATURATION: 98 % | TEMPERATURE: 97 F | DIASTOLIC BLOOD PRESSURE: 70 MMHG | WEIGHT: 204.38 LBS | HEART RATE: 73 BPM | BODY MASS INDEX: 38.59 KG/M2 | SYSTOLIC BLOOD PRESSURE: 140 MMHG | HEIGHT: 61 IN

## 2021-11-19 DIAGNOSIS — R03.0 ELEVATED BP WITHOUT DIAGNOSIS OF HYPERTENSION: ICD-10-CM

## 2021-11-19 DIAGNOSIS — E66.01 SEVERE OBESITY (BMI 35.0-39.9) WITH COMORBIDITY: ICD-10-CM

## 2021-11-19 DIAGNOSIS — J06.9 UPPER RESPIRATORY TRACT INFECTION, UNSPECIFIED TYPE: Primary | ICD-10-CM

## 2021-11-19 PROCEDURE — 3008F PR BODY MASS INDEX (BMI) DOCUMENTED: ICD-10-PCS | Mod: CPTII,S$GLB,, | Performed by: NURSE PRACTITIONER

## 2021-11-19 PROCEDURE — 3078F PR MOST RECENT DIASTOLIC BLOOD PRESSURE < 80 MM HG: ICD-10-PCS | Mod: CPTII,S$GLB,, | Performed by: NURSE PRACTITIONER

## 2021-11-19 PROCEDURE — 1160F RVW MEDS BY RX/DR IN RCRD: CPT | Mod: CPTII,S$GLB,, | Performed by: NURSE PRACTITIONER

## 2021-11-19 PROCEDURE — 3077F PR MOST RECENT SYSTOLIC BLOOD PRESSURE >= 140 MM HG: ICD-10-PCS | Mod: CPTII,S$GLB,, | Performed by: NURSE PRACTITIONER

## 2021-11-19 PROCEDURE — 1160F PR REVIEW ALL MEDS BY PRESCRIBER/CLIN PHARMACIST DOCUMENTED: ICD-10-PCS | Mod: CPTII,S$GLB,, | Performed by: NURSE PRACTITIONER

## 2021-11-19 PROCEDURE — 99999 PR PBB SHADOW E&M-EST. PATIENT-LVL IV: ICD-10-PCS | Mod: PBBFAC,,, | Performed by: NURSE PRACTITIONER

## 2021-11-19 PROCEDURE — 99213 OFFICE O/P EST LOW 20 MIN: CPT | Mod: S$GLB,,, | Performed by: NURSE PRACTITIONER

## 2021-11-19 PROCEDURE — 3008F BODY MASS INDEX DOCD: CPT | Mod: CPTII,S$GLB,, | Performed by: NURSE PRACTITIONER

## 2021-11-19 PROCEDURE — 1159F MED LIST DOCD IN RCRD: CPT | Mod: CPTII,S$GLB,, | Performed by: NURSE PRACTITIONER

## 2021-11-19 PROCEDURE — 3078F DIAST BP <80 MM HG: CPT | Mod: CPTII,S$GLB,, | Performed by: NURSE PRACTITIONER

## 2021-11-19 PROCEDURE — 99213 PR OFFICE/OUTPT VISIT, EST, LEVL III, 20-29 MIN: ICD-10-PCS | Mod: S$GLB,,, | Performed by: NURSE PRACTITIONER

## 2021-11-19 PROCEDURE — 1159F PR MEDICATION LIST DOCUMENTED IN MEDICAL RECORD: ICD-10-PCS | Mod: CPTII,S$GLB,, | Performed by: NURSE PRACTITIONER

## 2021-11-19 PROCEDURE — 3077F SYST BP >= 140 MM HG: CPT | Mod: CPTII,S$GLB,, | Performed by: NURSE PRACTITIONER

## 2021-11-19 PROCEDURE — 99999 PR PBB SHADOW E&M-EST. PATIENT-LVL IV: CPT | Mod: PBBFAC,,, | Performed by: NURSE PRACTITIONER

## 2021-11-19 RX ORDER — AMOXICILLIN AND CLAVULANATE POTASSIUM 875; 125 MG/1; MG/1
1 TABLET, FILM COATED ORAL EVERY 12 HOURS
Qty: 14 TABLET | Refills: 0 | Status: SHIPPED | OUTPATIENT
Start: 2021-11-19 | End: 2021-12-16 | Stop reason: ALTCHOICE

## 2021-11-19 RX ORDER — METHYLPREDNISOLONE 4 MG/1
TABLET ORAL
Qty: 21 TABLET | Refills: 0 | Status: SHIPPED | OUTPATIENT
Start: 2021-11-19 | End: 2021-12-16 | Stop reason: ALTCHOICE

## 2021-11-19 RX ORDER — BENZONATATE 200 MG/1
200 CAPSULE ORAL 3 TIMES DAILY PRN
Qty: 30 CAPSULE | Refills: 0 | Status: SHIPPED | OUTPATIENT
Start: 2021-11-19 | End: 2021-11-29

## 2021-12-16 ENCOUNTER — OFFICE VISIT (OUTPATIENT)
Dept: INTERNAL MEDICINE | Facility: CLINIC | Age: 66
End: 2021-12-16
Payer: COMMERCIAL

## 2021-12-16 VITALS
DIASTOLIC BLOOD PRESSURE: 72 MMHG | WEIGHT: 204.38 LBS | HEIGHT: 61 IN | SYSTOLIC BLOOD PRESSURE: 136 MMHG | TEMPERATURE: 98 F | BODY MASS INDEX: 38.59 KG/M2 | HEART RATE: 73 BPM | OXYGEN SATURATION: 98 %

## 2021-12-16 DIAGNOSIS — J06.9 UPPER RESPIRATORY TRACT INFECTION, UNSPECIFIED TYPE: Primary | ICD-10-CM

## 2021-12-16 LAB
CTP QC/QA: YES
SARS-COV-2 RDRP RESP QL NAA+PROBE: NEGATIVE

## 2021-12-16 PROCEDURE — 99213 OFFICE O/P EST LOW 20 MIN: CPT | Mod: S$GLB,,, | Performed by: NURSE PRACTITIONER

## 2021-12-16 PROCEDURE — U0002: ICD-10-PCS | Mod: QW,S$GLB,, | Performed by: NURSE PRACTITIONER

## 2021-12-16 PROCEDURE — 99999 PR PBB SHADOW E&M-EST. PATIENT-LVL IV: ICD-10-PCS | Mod: PBBFAC,,, | Performed by: NURSE PRACTITIONER

## 2021-12-16 PROCEDURE — U0002 COVID-19 LAB TEST NON-CDC: HCPCS | Mod: QW,S$GLB,, | Performed by: NURSE PRACTITIONER

## 2021-12-16 PROCEDURE — 99999 PR PBB SHADOW E&M-EST. PATIENT-LVL IV: CPT | Mod: PBBFAC,,, | Performed by: NURSE PRACTITIONER

## 2021-12-16 PROCEDURE — 99213 PR OFFICE/OUTPT VISIT, EST, LEVL III, 20-29 MIN: ICD-10-PCS | Mod: S$GLB,,, | Performed by: NURSE PRACTITIONER

## 2021-12-16 RX ORDER — FLUTICASONE PROPIONATE 50 MCG
2 SPRAY, SUSPENSION (ML) NASAL DAILY
Qty: 16 G | Refills: 0 | Status: SHIPPED | OUTPATIENT
Start: 2021-12-16 | End: 2023-09-21

## 2021-12-16 RX ORDER — CETIRIZINE HYDROCHLORIDE 10 MG/1
10 TABLET ORAL DAILY
Qty: 30 TABLET | Refills: 0 | Status: SHIPPED | OUTPATIENT
Start: 2021-12-16 | End: 2023-09-21

## 2021-12-22 ENCOUNTER — PATIENT OUTREACH (OUTPATIENT)
Dept: ADMINISTRATIVE | Facility: HOSPITAL | Age: 66
End: 2021-12-22
Payer: COMMERCIAL

## 2022-01-25 ENCOUNTER — OFFICE VISIT (OUTPATIENT)
Dept: INTERNAL MEDICINE | Facility: CLINIC | Age: 67
End: 2022-01-25
Payer: COMMERCIAL

## 2022-01-25 ENCOUNTER — LAB VISIT (OUTPATIENT)
Dept: LAB | Facility: HOSPITAL | Age: 67
End: 2022-01-25
Attending: FAMILY MEDICINE
Payer: COMMERCIAL

## 2022-01-25 VITALS
BODY MASS INDEX: 39.67 KG/M2 | OXYGEN SATURATION: 99 % | HEIGHT: 61 IN | WEIGHT: 210.13 LBS | RESPIRATION RATE: 18 BRPM | SYSTOLIC BLOOD PRESSURE: 130 MMHG | HEART RATE: 87 BPM | TEMPERATURE: 98 F | DIASTOLIC BLOOD PRESSURE: 72 MMHG

## 2022-01-25 DIAGNOSIS — Z00.00 ROUTINE HEALTH MAINTENANCE: Primary | ICD-10-CM

## 2022-01-25 DIAGNOSIS — R05.9 COUGH: ICD-10-CM

## 2022-01-25 DIAGNOSIS — Z00.00 ROUTINE HEALTH MAINTENANCE: ICD-10-CM

## 2022-01-25 DIAGNOSIS — E66.01 SEVERE OBESITY (BMI 35.0-39.9) WITH COMORBIDITY: ICD-10-CM

## 2022-01-25 PROBLEM — J06.9 UPPER RESPIRATORY TRACT INFECTION: Status: RESOLVED | Noted: 2021-11-19 | Resolved: 2022-01-25

## 2022-01-25 LAB
ALBUMIN SERPL BCP-MCNC: 3.5 G/DL (ref 3.5–5.2)
ALP SERPL-CCNC: 90 U/L (ref 55–135)
ALT SERPL W/O P-5'-P-CCNC: 21 U/L (ref 10–44)
ANION GAP SERPL CALC-SCNC: 9 MMOL/L (ref 8–16)
AST SERPL-CCNC: 24 U/L (ref 10–40)
BASOPHILS # BLD AUTO: 0.05 K/UL (ref 0–0.2)
BASOPHILS NFR BLD: 0.7 % (ref 0–1.9)
BILIRUB SERPL-MCNC: 0.2 MG/DL (ref 0.1–1)
BUN SERPL-MCNC: 18 MG/DL (ref 8–23)
CALCIUM SERPL-MCNC: 9.9 MG/DL (ref 8.7–10.5)
CHLORIDE SERPL-SCNC: 103 MMOL/L (ref 95–110)
CO2 SERPL-SCNC: 30 MMOL/L (ref 23–29)
CREAT SERPL-MCNC: 1.1 MG/DL (ref 0.5–1.4)
DIFFERENTIAL METHOD: ABNORMAL
EOSINOPHIL # BLD AUTO: 0.2 K/UL (ref 0–0.5)
EOSINOPHIL NFR BLD: 2.9 % (ref 0–8)
ERYTHROCYTE [DISTWIDTH] IN BLOOD BY AUTOMATED COUNT: 13.4 % (ref 11.5–14.5)
EST. GFR  (AFRICAN AMERICAN): >60 ML/MIN/1.73 M^2
EST. GFR  (NON AFRICAN AMERICAN): 53.2 ML/MIN/1.73 M^2
GLUCOSE SERPL-MCNC: 97 MG/DL (ref 70–110)
HCT VFR BLD AUTO: 34.9 % (ref 37–48.5)
HGB BLD-MCNC: 11.1 G/DL (ref 12–16)
IMM GRANULOCYTES # BLD AUTO: 0.03 K/UL (ref 0–0.04)
IMM GRANULOCYTES NFR BLD AUTO: 0.4 % (ref 0–0.5)
LYMPHOCYTES # BLD AUTO: 1.6 K/UL (ref 1–4.8)
LYMPHOCYTES NFR BLD: 21.4 % (ref 18–48)
MCH RBC QN AUTO: 27.4 PG (ref 27–31)
MCHC RBC AUTO-ENTMCNC: 31.8 G/DL (ref 32–36)
MCV RBC AUTO: 86 FL (ref 82–98)
MONOCYTES # BLD AUTO: 0.8 K/UL (ref 0.3–1)
MONOCYTES NFR BLD: 10.8 % (ref 4–15)
NEUTROPHILS # BLD AUTO: 4.9 K/UL (ref 1.8–7.7)
NEUTROPHILS NFR BLD: 63.8 % (ref 38–73)
NRBC BLD-RTO: 0 /100 WBC
PLATELET # BLD AUTO: 302 K/UL (ref 150–450)
PMV BLD AUTO: 10.7 FL (ref 9.2–12.9)
POTASSIUM SERPL-SCNC: 4 MMOL/L (ref 3.5–5.1)
PROT SERPL-MCNC: 8 G/DL (ref 6–8.4)
RBC # BLD AUTO: 4.05 M/UL (ref 4–5.4)
SODIUM SERPL-SCNC: 142 MMOL/L (ref 136–145)
WBC # BLD AUTO: 7.65 K/UL (ref 3.9–12.7)

## 2022-01-25 PROCEDURE — 3075F PR MOST RECENT SYSTOLIC BLOOD PRESS GE 130-139MM HG: ICD-10-PCS | Mod: CPTII,S$GLB,, | Performed by: FAMILY MEDICINE

## 2022-01-25 PROCEDURE — 99999 PR PBB SHADOW E&M-EST. PATIENT-LVL IV: CPT | Mod: PBBFAC,,, | Performed by: FAMILY MEDICINE

## 2022-01-25 PROCEDURE — 85025 COMPLETE CBC W/AUTO DIFF WBC: CPT | Mod: PO | Performed by: FAMILY MEDICINE

## 2022-01-25 PROCEDURE — 90715 TDAP VACCINE GREATER THAN OR EQUAL TO 7YO IM: ICD-10-PCS | Mod: S$GLB,,, | Performed by: FAMILY MEDICINE

## 2022-01-25 PROCEDURE — 3008F BODY MASS INDEX DOCD: CPT | Mod: CPTII,S$GLB,, | Performed by: FAMILY MEDICINE

## 2022-01-25 PROCEDURE — 90471 TDAP VACCINE GREATER THAN OR EQUAL TO 7YO IM: ICD-10-PCS | Mod: S$GLB,,, | Performed by: FAMILY MEDICINE

## 2022-01-25 PROCEDURE — 90715 TDAP VACCINE 7 YRS/> IM: CPT | Mod: S$GLB,,, | Performed by: FAMILY MEDICINE

## 2022-01-25 PROCEDURE — 36415 COLL VENOUS BLD VENIPUNCTURE: CPT | Mod: PO | Performed by: FAMILY MEDICINE

## 2022-01-25 PROCEDURE — 90471 IMMUNIZATION ADMIN: CPT | Mod: S$GLB,,, | Performed by: FAMILY MEDICINE

## 2022-01-25 PROCEDURE — 90472 IMMUNIZATION ADMIN EACH ADD: CPT | Mod: S$GLB,,, | Performed by: FAMILY MEDICINE

## 2022-01-25 PROCEDURE — 80053 COMPREHEN METABOLIC PANEL: CPT | Mod: PO | Performed by: FAMILY MEDICINE

## 2022-01-25 PROCEDURE — 1159F PR MEDICATION LIST DOCUMENTED IN MEDICAL RECORD: ICD-10-PCS | Mod: CPTII,S$GLB,, | Performed by: FAMILY MEDICINE

## 2022-01-25 PROCEDURE — 3008F PR BODY MASS INDEX (BMI) DOCUMENTED: ICD-10-PCS | Mod: CPTII,S$GLB,, | Performed by: FAMILY MEDICINE

## 2022-01-25 PROCEDURE — 90750 HZV VACC RECOMBINANT IM: CPT | Mod: S$GLB,,, | Performed by: FAMILY MEDICINE

## 2022-01-25 PROCEDURE — 99396 PR PREVENTIVE VISIT,EST,40-64: ICD-10-PCS | Mod: 25,S$GLB,, | Performed by: FAMILY MEDICINE

## 2022-01-25 PROCEDURE — 3044F HG A1C LEVEL LT 7.0%: CPT | Mod: CPTII,S$GLB,, | Performed by: FAMILY MEDICINE

## 2022-01-25 PROCEDURE — 99999 PR PBB SHADOW E&M-EST. PATIENT-LVL IV: ICD-10-PCS | Mod: PBBFAC,,, | Performed by: FAMILY MEDICINE

## 2022-01-25 PROCEDURE — 3075F SYST BP GE 130 - 139MM HG: CPT | Mod: CPTII,S$GLB,, | Performed by: FAMILY MEDICINE

## 2022-01-25 PROCEDURE — 90750 ZOSTER RECOMBINANT VACCINE: ICD-10-PCS | Mod: S$GLB,,, | Performed by: FAMILY MEDICINE

## 2022-01-25 PROCEDURE — 1159F MED LIST DOCD IN RCRD: CPT | Mod: CPTII,S$GLB,, | Performed by: FAMILY MEDICINE

## 2022-01-25 PROCEDURE — 90472 ZOSTER RECOMBINANT VACCINE: ICD-10-PCS | Mod: S$GLB,,, | Performed by: FAMILY MEDICINE

## 2022-01-25 PROCEDURE — 80061 LIPID PANEL: CPT | Performed by: FAMILY MEDICINE

## 2022-01-25 PROCEDURE — 83036 HEMOGLOBIN GLYCOSYLATED A1C: CPT | Performed by: FAMILY MEDICINE

## 2022-01-25 PROCEDURE — 3078F DIAST BP <80 MM HG: CPT | Mod: CPTII,S$GLB,, | Performed by: FAMILY MEDICINE

## 2022-01-25 PROCEDURE — 3078F PR MOST RECENT DIASTOLIC BLOOD PRESSURE < 80 MM HG: ICD-10-PCS | Mod: CPTII,S$GLB,, | Performed by: FAMILY MEDICINE

## 2022-01-25 PROCEDURE — 99396 PREV VISIT EST AGE 40-64: CPT | Mod: 25,S$GLB,, | Performed by: FAMILY MEDICINE

## 2022-01-25 PROCEDURE — 3044F PR MOST RECENT HEMOGLOBIN A1C LEVEL <7.0%: ICD-10-PCS | Mod: CPTII,S$GLB,, | Performed by: FAMILY MEDICINE

## 2022-01-25 NOTE — PROGRESS NOTES
"Subjective:       Patient ID: Bonnie Vazquez is a 66 y.o. female.    Chief Complaint: Follow-up    HPI  Came in today for routine annual exam.  Has had some cough recently.  Tested today for COVID which was negative.  Otherwise no new concerns.  Due for tetanus booster and initial shingles vaccine.  Taking medications as listed below which mostly consists of supplements.    Family History   Problem Relation Age of Onset    Heart disease Mother     Cataracts Mother     Diabetes Father     Diabetes Brother     Breast cancer Neg Hx     Colon cancer Neg Hx     Ovarian cancer Neg Hx     Thrombophilia Neg Hx        Current Outpatient Medications:     ascorbic acid, vitamin C, (VITAMIN C) 100 MG tablet, Take 100 mg by mouth daily as needed., Disp: , Rfl:     biotin 1 mg tablet, Take 1,000 mcg by mouth 3 (three) times daily., Disp: , Rfl:     calcium-vitamin D3 (OS-JEREMI 500 + D3) 500 mg(1,250mg) -200 unit per tablet, Take 1 tablet by mouth 2 (two) times daily with meals., Disp: , Rfl:     cetirizine (ZYRTEC) 10 MG tablet, Take 1 tablet (10 mg total) by mouth once daily., Disp: 30 tablet, Rfl: 0    ferrous sulfate (IRON ORAL), Take 1 tablet by mouth once daily., Disp: , Rfl:     fluticasone propionate (FLONASE) 50 mcg/actuation nasal spray, 2 sprays (100 mcg total) by Each Nostril route once daily., Disp: 16 g, Rfl: 0    multivitamin capsule, Take 1 capsule by mouth once daily., Disp: , Rfl:     Review of Systems   Constitutional: Negative for chills and fever.   Respiratory: Positive for cough. Negative for shortness of breath.    Cardiovascular: Negative for chest pain.   Gastrointestinal: Negative for abdominal pain.   Skin: Negative for rash.   Neurological: Negative for dizziness.       Objective:   /72 (BP Location: Left arm, Patient Position: Sitting, BP Method: Large (Manual))   Pulse 87   Temp 97.5 °F (36.4 °C) (Temporal)   Resp 18   Ht 5' 1" (1.549 m)   Wt 95.3 kg (210 lb 1.6 oz)   " SpO2 99%   BMI 39.70 kg/m²      Physical Exam  Vitals reviewed.   Constitutional:       Appearance: She is well-developed and well-nourished.   HENT:      Head: Normocephalic and atraumatic.   Eyes:      Conjunctiva/sclera: Conjunctivae normal.   Cardiovascular:      Rate and Rhythm: Normal rate.   Pulmonary:      Effort: Pulmonary effort is normal. No respiratory distress.   Musculoskeletal:         General: No edema.   Skin:     General: Skin is warm and dry.      Findings: No rash.   Neurological:      Mental Status: She is alert and oriented to person, place, and time.      Coordination: Coordination normal.   Psychiatric:         Mood and Affect: Mood and affect normal.         Behavior: Behavior normal.         Assessment & Plan     Problem List Items Addressed This Visit        Endocrine    Severe obesity (BMI 35.0-39.9) with comorbidity    Relevant Orders    Hemoglobin A1C (Completed)      Other Visit Diagnoses     Routine health maintenance    -  Primary    Relevant Orders    Lipid Panel (Completed)    CBC Auto Differential (Completed)    Comprehensive Metabolic Panel (Completed)    Hemoglobin A1C (Completed)    (In Office Administered) Tdap Vaccine (Completed)    Zoster Recombinant Vaccine (Completed)    Cough        Relevant Orders    POCT COVID-19 Rapid Screening            Immunizations Administered on Date of Encounter - 1/25/2022     Name Date Dose VIS Date Route Exp Date    TDAP 1/25/2022  4:39 PM 0.5 mL 8/6/2021 Intramuscular 6/5/2023    Site: Right deltoid     Given By: Gaby Beyer LPN     : [employer]     Lot: [id number]     Zoster Recombinant 1/25/2022  4:39 PM 0.5 mL 10/30/2019 Intramuscular 4/6/2023    Site: Left deltoid     Given By: Gaby Beyer LPN     : [employer]     Lot: [id number]            No follow-ups on file.    Disclaimer:  This note may have been prepared using voice recognition software, it may have not been extensively proofed,  as such there could be errors within the text such as sound alike errors.

## 2022-01-26 LAB
CHOLEST SERPL-MCNC: 167 MG/DL (ref 120–199)
CHOLEST/HDLC SERPL: 3.6 {RATIO} (ref 2–5)
ESTIMATED AVG GLUCOSE: 97 MG/DL (ref 68–131)
HBA1C MFR BLD: 5 % (ref 4–5.6)
HDLC SERPL-MCNC: 46 MG/DL (ref 40–75)
HDLC SERPL: 27.5 % (ref 20–50)
LDLC SERPL CALC-MCNC: 105.8 MG/DL (ref 63–159)
NONHDLC SERPL-MCNC: 121 MG/DL
TRIGL SERPL-MCNC: 76 MG/DL (ref 30–150)

## 2022-03-28 ENCOUNTER — TELEPHONE (OUTPATIENT)
Dept: INTERNAL MEDICINE | Facility: CLINIC | Age: 67
End: 2022-03-28
Payer: COMMERCIAL

## 2022-03-28 ENCOUNTER — CLINICAL SUPPORT (OUTPATIENT)
Dept: INTERNAL MEDICINE | Facility: CLINIC | Age: 67
End: 2022-03-28
Payer: COMMERCIAL

## 2022-03-28 DIAGNOSIS — Z23 NEED FOR SHINGLES VACCINE: Primary | ICD-10-CM

## 2022-03-28 PROCEDURE — 90750 ZOSTER RECOMBINANT VACCINE: ICD-10-PCS | Mod: S$GLB,,, | Performed by: NURSE PRACTITIONER

## 2022-03-28 PROCEDURE — 99999 PR PBB SHADOW E&M-EST. PATIENT-LVL II: ICD-10-PCS | Mod: PBBFAC,,,

## 2022-03-28 PROCEDURE — 90471 ZOSTER RECOMBINANT VACCINE: ICD-10-PCS | Mod: S$GLB,,, | Performed by: NURSE PRACTITIONER

## 2022-03-28 PROCEDURE — 90471 IMMUNIZATION ADMIN: CPT | Mod: S$GLB,,, | Performed by: NURSE PRACTITIONER

## 2022-03-28 PROCEDURE — 99999 PR PBB SHADOW E&M-EST. PATIENT-LVL II: CPT | Mod: PBBFAC,,,

## 2022-03-28 PROCEDURE — 90750 HZV VACC RECOMBINANT IM: CPT | Mod: S$GLB,,, | Performed by: NURSE PRACTITIONER

## 2022-03-28 NOTE — TELEPHONE ENCOUNTER
----- Message from Nereida Green sent at 3/28/2022 12:55 PM CDT -----  Contact: ALLA CHAUDHRY [[id number]]  .Type:  Sooner Apoointment Request    Caller is requesting a sooner appointment.  Caller declined first available appointment listed below.  Caller will not accept being placed on the waitlist and is requesting a message be sent to doctor.  Name of Caller:ALLA CHAUDHRY [[id number]]  When is the first available appointment?:  Symptoms: pt 2nd shingles shot   Would the patient rather a call back or a response via [facility]?   Call    Best Call Back Number: [phone]  Additional Information: pt is want any evening appt 3:45 pm.

## 2022-09-15 ENCOUNTER — OFFICE VISIT (OUTPATIENT)
Dept: INTERNAL MEDICINE | Facility: CLINIC | Age: 67
End: 2022-09-15
Payer: COMMERCIAL

## 2022-09-15 VITALS
DIASTOLIC BLOOD PRESSURE: 84 MMHG | OXYGEN SATURATION: 99 % | BODY MASS INDEX: 38.24 KG/M2 | HEART RATE: 67 BPM | WEIGHT: 202.38 LBS | TEMPERATURE: 97 F | SYSTOLIC BLOOD PRESSURE: 132 MMHG

## 2022-09-15 DIAGNOSIS — J31.0 CHRONIC RHINITIS: ICD-10-CM

## 2022-09-15 DIAGNOSIS — R03.0 ELEVATED BP WITHOUT DIAGNOSIS OF HYPERTENSION: ICD-10-CM

## 2022-09-15 DIAGNOSIS — E66.01 SEVERE OBESITY (BMI 35.0-39.9) WITH COMORBIDITY: Primary | ICD-10-CM

## 2022-09-15 DIAGNOSIS — D75.A G6PD DEFICIENCY: Chronic | ICD-10-CM

## 2022-09-15 PROCEDURE — 99999 PR PBB SHADOW E&M-EST. PATIENT-LVL IV: CPT | Mod: PBBFAC,,, | Performed by: PEDIATRICS

## 2022-09-15 PROCEDURE — 3075F SYST BP GE 130 - 139MM HG: CPT | Mod: CPTII,S$GLB,, | Performed by: PEDIATRICS

## 2022-09-15 PROCEDURE — 3044F HG A1C LEVEL LT 7.0%: CPT | Mod: CPTII,S$GLB,, | Performed by: PEDIATRICS

## 2022-09-15 PROCEDURE — 1160F RVW MEDS BY RX/DR IN RCRD: CPT | Mod: CPTII,S$GLB,, | Performed by: PEDIATRICS

## 2022-09-15 PROCEDURE — 1159F PR MEDICATION LIST DOCUMENTED IN MEDICAL RECORD: ICD-10-PCS | Mod: CPTII,S$GLB,, | Performed by: PEDIATRICS

## 2022-09-15 PROCEDURE — 99213 PR OFFICE/OUTPT VISIT, EST, LEVL III, 20-29 MIN: ICD-10-PCS | Mod: S$GLB,,, | Performed by: PEDIATRICS

## 2022-09-15 PROCEDURE — 99213 OFFICE O/P EST LOW 20 MIN: CPT | Mod: S$GLB,,, | Performed by: PEDIATRICS

## 2022-09-15 PROCEDURE — 3008F PR BODY MASS INDEX (BMI) DOCUMENTED: ICD-10-PCS | Mod: CPTII,S$GLB,, | Performed by: PEDIATRICS

## 2022-09-15 PROCEDURE — 99999 PR PBB SHADOW E&M-EST. PATIENT-LVL IV: ICD-10-PCS | Mod: PBBFAC,,, | Performed by: PEDIATRICS

## 2022-09-15 PROCEDURE — 3044F PR MOST RECENT HEMOGLOBIN A1C LEVEL <7.0%: ICD-10-PCS | Mod: CPTII,S$GLB,, | Performed by: PEDIATRICS

## 2022-09-15 PROCEDURE — 1159F MED LIST DOCD IN RCRD: CPT | Mod: CPTII,S$GLB,, | Performed by: PEDIATRICS

## 2022-09-15 PROCEDURE — 3075F PR MOST RECENT SYSTOLIC BLOOD PRESS GE 130-139MM HG: ICD-10-PCS | Mod: CPTII,S$GLB,, | Performed by: PEDIATRICS

## 2022-09-15 PROCEDURE — 3079F PR MOST RECENT DIASTOLIC BLOOD PRESSURE 80-89 MM HG: ICD-10-PCS | Mod: CPTII,S$GLB,, | Performed by: PEDIATRICS

## 2022-09-15 PROCEDURE — 3008F BODY MASS INDEX DOCD: CPT | Mod: CPTII,S$GLB,, | Performed by: PEDIATRICS

## 2022-09-15 PROCEDURE — 1160F PR REVIEW ALL MEDS BY PRESCRIBER/CLIN PHARMACIST DOCUMENTED: ICD-10-PCS | Mod: CPTII,S$GLB,, | Performed by: PEDIATRICS

## 2022-09-15 PROCEDURE — 3079F DIAST BP 80-89 MM HG: CPT | Mod: CPTII,S$GLB,, | Performed by: PEDIATRICS

## 2022-09-15 NOTE — PROGRESS NOTES
Subjective:       Patient ID: Bonnie Vazquez is a 66 y.o. female.    Chief Complaint: Annual Exam    Bonnie Vazquez is a 66 y.o. female who presents to clinic for a follow up. Has been having mild URI Sx of cough and congestion. Has not used any OTC meds.        G6PD deficiency     Anemia     Special screening for malignant neoplasms, colon     Colon polyp     Lipoma of colon     Elevated BP without diagnosis of hypertension     Porcelain gallbladder     Acute right-sided low back pain with right-sided sciatica     History of COVID-19     Severe obesity (BMI 35.0-39.9) with comorbidity    PMHx, PSHx, SocHx, and FHx reviewed and discussed with patient.     Review of Systems   Constitutional:  Negative for activity change, appetite change, chills, diaphoresis, fatigue, fever and unexpected weight change.   HENT:  Positive for nasal congestion and sinus pressure/congestion. Negative for ear pain, mouth sores, nosebleeds, postnasal drip, rhinorrhea, sneezing and sore throat.    Eyes:  Negative for photophobia, pain, discharge, redness and visual disturbance.   Respiratory:  Positive for cough. Negative for chest tightness, shortness of breath, wheezing and stridor.    Cardiovascular:  Negative for chest pain, palpitations and leg swelling.   Gastrointestinal:  Negative for constipation, diarrhea, nausea and vomiting.   Genitourinary:  Negative for decreased urine volume, difficulty urinating, dysuria, flank pain, frequency, hematuria and urgency.   Musculoskeletal:  Negative for arthralgias, back pain, joint swelling, neck pain and neck stiffness.   Integumentary:  Negative for color change and rash.   Neurological:  Negative for dizziness, syncope, speech difficulty, weakness, light-headedness and headaches.   Hematological:  Negative for adenopathy. Does not bruise/bleed easily.   Psychiatric/Behavioral:  Negative for confusion, decreased concentration, dysphoric mood, hallucinations, sleep disturbance and suicidal  ideas. The patient is not nervous/anxious.    All other systems reviewed and are negative.      Objective:      Physical Exam  Vitals and nursing note reviewed.   Constitutional:       General: She is not in acute distress.     Appearance: She is well-developed.   HENT:      Right Ear: Tympanic membrane, ear canal and external ear normal.      Left Ear: Tympanic membrane, ear canal and external ear normal.      Nose: Rhinorrhea present.      Mouth/Throat:      Pharynx: No oropharyngeal exudate or posterior oropharyngeal erythema.      Comments: Slightly pale and boggy turbinates, mild PND  Neck:      Thyroid: No thyromegaly.      Vascular: No JVD.   Cardiovascular:      Rate and Rhythm: Normal rate and regular rhythm.      Heart sounds: Normal heart sounds. No murmur heard.  Pulmonary:      Effort: Pulmonary effort is normal. No respiratory distress.      Breath sounds: Normal breath sounds. No wheezing or rales.   Abdominal:      General: There is no distension.      Palpations: Abdomen is soft. There is no mass.      Tenderness: There is no abdominal tenderness. There is no guarding.   Musculoskeletal:      Right lower leg: No edema.      Left lower leg: No edema.   Lymphadenopathy:      Cervical: No cervical adenopathy.   Skin:     Capillary Refill: Capillary refill takes less than 2 seconds.      Findings: No rash.   Neurological:      General: No focal deficit present.      Mental Status: She is alert and oriented to person, place, and time.      Cranial Nerves: No cranial nerve deficit.      Coordination: Coordination normal.   Psychiatric:         Mood and Affect: Mood normal.         Behavior: Behavior normal.         Thought Content: Thought content normal.         Judgment: Judgment normal.       Assessment:       Problem List Items Addressed This Visit       G6PD deficiency (Chronic)    Chronic rhinitis    Elevated BP without diagnosis of hypertension    Severe obesity (BMI 35.0-39.9) with comorbidity -  Primary       Plan:     Severe obesity (BMI 35.0-39.9) with comorbidity    Elevated BP without diagnosis of hypertension    Chronic rhinitis    G6PD deficiency      G6PD deficiency discussed with Pt. Pt information given. Flu and COVID vaccine discussed. BP today is normal. D&E and weight loss. GLP1 inhib discussed with Pt. No CI. Pt wishes to hold off for now. Flonase and Claritin for presumed allergies. Follow up with Mrs Rolle in January for annual.     Scribe Attestation:   I, Velasquez Alarcon, am scribing for, and in the presence of, Dr. Mc Frazier Jr. I performed the above scribed service and the documentation accurately describes the services I performed. I attest to the accuracy of the note.    I, Dr. Mc Frazier Jr, reviewed documentation as scribed above. I personally performed the services described in this documentation.  I agree that the record reflects my personal performance and is accurate and complete. Mc Frazier Jr., MD.  09/15/2022

## 2023-08-24 NOTE — PLAN OF CARE
Pt wheeled out at this time with all personal belongings to private vehicle. NO    Complex Repair And Rhombic Flap Text: The defect edges were debeveled with a #15 scalpel blade.  The primary defect was closed partially with a complex linear closure.  Given the location of the remaining defect, shape of the defect and the proximity to free margins a rhombic flap was deemed most appropriate for complete closure of the defect.  Using a sterile surgical marker, an appropriate advancement flap was drawn incorporating the defect and placing the expected incisions within the relaxed skin tension lines where possible. The area thus outlined was incised deep to adipose tissue with a #15 scalpel blade. The skin margins were undermined to an appropriate distance in all directions utilizing iris scissors and carried over to close the primary defect.

## 2023-09-19 ENCOUNTER — OFFICE VISIT (OUTPATIENT)
Dept: INTERNAL MEDICINE | Facility: CLINIC | Age: 68
End: 2023-09-19
Payer: MEDICARE

## 2023-09-19 ENCOUNTER — HOSPITAL ENCOUNTER (OUTPATIENT)
Dept: RADIOLOGY | Facility: HOSPITAL | Age: 68
Discharge: HOME | End: 2023-09-19
Attending: NURSE PRACTITIONER
Payer: MEDICARE

## 2023-09-19 VITALS
DIASTOLIC BLOOD PRESSURE: 80 MMHG | HEART RATE: 97 BPM | RESPIRATION RATE: 18 BRPM | OXYGEN SATURATION: 96 % | SYSTOLIC BLOOD PRESSURE: 126 MMHG | BODY MASS INDEX: 38.29 KG/M2 | WEIGHT: 202.81 LBS | HEIGHT: 61 IN | TEMPERATURE: 97 F

## 2023-09-19 DIAGNOSIS — G89.29 CHRONIC PAIN OF RIGHT KNEE: Primary | ICD-10-CM

## 2023-09-19 DIAGNOSIS — G89.29 CHRONIC PAIN OF RIGHT KNEE: ICD-10-CM

## 2023-09-19 DIAGNOSIS — M25.562 ACUTE PAIN OF LEFT KNEE: ICD-10-CM

## 2023-09-19 DIAGNOSIS — R01.1 HEART MURMUR: ICD-10-CM

## 2023-09-19 DIAGNOSIS — M25.561 CHRONIC PAIN OF RIGHT KNEE: Primary | ICD-10-CM

## 2023-09-19 DIAGNOSIS — M25.561 CHRONIC PAIN OF RIGHT KNEE: ICD-10-CM

## 2023-09-19 PROCEDURE — 99999 PR PBB SHADOW E&M-EST. PATIENT-LVL V: ICD-10-PCS | Mod: PBBFAC,,, | Performed by: NURSE PRACTITIONER

## 2023-09-19 PROCEDURE — 73562 XR KNEE ORTHO BILAT: ICD-10-PCS | Mod: 26,50,, | Performed by: RADIOLOGY

## 2023-09-19 PROCEDURE — 73562 X-RAY EXAM OF KNEE 3: CPT | Mod: 26,50,, | Performed by: RADIOLOGY

## 2023-09-19 PROCEDURE — 73562 X-RAY EXAM OF KNEE 3: CPT | Mod: TC,50,PO

## 2023-09-19 PROCEDURE — 99215 OFFICE O/P EST HI 40 MIN: CPT | Mod: PBBFAC,PO | Performed by: NURSE PRACTITIONER

## 2023-09-19 PROCEDURE — 99999 PR PBB SHADOW E&M-EST. PATIENT-LVL V: CPT | Mod: PBBFAC,,, | Performed by: NURSE PRACTITIONER

## 2023-09-19 PROCEDURE — 99214 OFFICE O/P EST MOD 30 MIN: CPT | Mod: S$PBB,,, | Performed by: NURSE PRACTITIONER

## 2023-09-19 PROCEDURE — 99214 PR OFFICE/OUTPT VISIT, EST, LEVL IV, 30-39 MIN: ICD-10-PCS | Mod: S$PBB,,, | Performed by: NURSE PRACTITIONER

## 2023-09-19 RX ORDER — DICLOFENAC SODIUM 10 MG/G
2 GEL TOPICAL 2 TIMES DAILY PRN
Qty: 100 G | Refills: 0 | Status: SHIPPED | OUTPATIENT
Start: 2023-09-19

## 2023-09-19 RX ORDER — NAPROXEN 500 MG/1
500 TABLET ORAL 2 TIMES DAILY PRN
Qty: 30 TABLET | Refills: 0 | Status: SHIPPED | OUTPATIENT
Start: 2023-09-19 | End: 2023-10-19 | Stop reason: ALTCHOICE

## 2023-09-19 NOTE — PROGRESS NOTES
Subjective:       Patient ID: Bonnie Vazquez is a 67 y.o. female.    Chief Complaint: Leg Pain    Mrs. Vazquez presents to visit for complaint of L leg pain and R knee pain. R knee pain has been going on for months, improves with a sleeve/brace. Pain rated a 8/10 on numeric pain scale. Did have mild arthritis on bilateral knee xray in 2017. Imaging reviewed. L leg has been hurting for months also, but worse over past 2 weeks. Pain is intermittent for both legs. Pain in left leg radiates from knee into lateral thigh and calf. Pain worse with ambulation.         Patient Active Problem List   Diagnosis    G6PD deficiency    Anemia    Special screening for malignant neoplasms, colon    Colon polyp    Lipoma of colon    Elevated BP without diagnosis of hypertension    Porcelain gallbladder    Acute right-sided low back pain with right-sided sciatica    History of COVID-19    Severe obesity (BMI 35.0-39.9) with comorbidity    Chronic rhinitis    Heart murmur    Chronic pain of right knee       Family History   Problem Relation Age of Onset    Heart disease Mother     Cataracts Mother     Diabetes Father     Diabetes Brother     Breast cancer Neg Hx     Colon cancer Neg Hx     Ovarian cancer Neg Hx     Thrombophilia Neg Hx      Past Surgical History:   Procedure Laterality Date    COLONOSCOPY N/A 3/7/2019    Procedure: COLONOSCOPY;  Surgeon: Jerry Sawant MD;  Location: [facility] ENDO;  Service: Endoscopy;  Laterality: N/A;    HYSTERECTOMY      benign    KIDNEY STONE SURGERY      LAPAROSCOPIC LYSIS OF ADHESIONS N/A 2/20/2020    Procedure: LYSIS, ADHESIONS, LAPAROSCOPIC;  Surgeon: Dawson Elizalde MD;  Location: [facility] OR;  Service: General;  Laterality: N/A;    ROBOT-ASSISTED CHOLECYSTECTOMY USING DA DAVINA XI N/A 2/20/2020    Procedure: XI ROBOTIC CHOLECYSTECTOMY;  Surgeon: Dawson Elizalde MD;  Location: [facility] OR;  Service: General;  Laterality: N/A;         Current Outpatient Medications:     multivitamin capsule, Take 1 capsule by  "mouth once daily., Disp: , Rfl:     ascorbic acid, vitamin C, (VITAMIN C) 100 MG tablet, Take 100 mg by mouth daily as needed., Disp: , Rfl:     biotin 1 mg tablet, Take 1,000 mcg by mouth 3 (three) times daily., Disp: , Rfl:     calcium-vitamin D3 (OS-JEREMI 500 + D3) 500 mg(1,250mg) -200 unit per tablet, Take 1 tablet by mouth 2 (two) times daily with meals., Disp: , Rfl:     cetirizine (ZYRTEC) 10 MG tablet, Take 1 tablet (10 mg total) by mouth once daily. (Patient not taking: Reported on 9/15/2022), Disp: 30 tablet, Rfl: 0    diclofenac sodium (VOLTAREN) 1 % Gel, Apply 2 g topically 2 (two) times daily as needed., Disp: 100 g, Rfl: 0    ELDERBERRY FRUIT ORAL, Take by mouth., Disp: , Rfl:     ferrous sulfate (IRON ORAL), Take 1 tablet by mouth once daily., Disp: , Rfl:     fluticasone propionate (FLONASE) 50 mcg/actuation nasal spray, 2 sprays (100 mcg total) by Each Nostril route once daily. (Patient not taking: Reported on 9/19/2023), Disp: 16 g, Rfl: 0    naproxen (NAPROSYN) 500 MG tablet, Take 1 tablet (500 mg total) by mouth 2 (two) times daily as needed (pain)., Disp: 30 tablet, Rfl: 0    Review of Systems   Constitutional:  Negative for appetite change, chills, fatigue and fever.   Respiratory:  Negative for cough and shortness of breath.    Cardiovascular:  Negative for chest pain, palpitations and leg swelling.   Gastrointestinal:  Negative for abdominal pain, diarrhea, nausea and vomiting.   Musculoskeletal:  Positive for arthralgias and myalgias. Negative for joint swelling.   Neurological:  Negative for dizziness, light-headedness and headaches.       Objective:   /80 (BP Location: Left arm, Patient Position: Sitting, BP Method: Large (Manual))   Pulse 97   Temp 97 °F (36.1 °C) (Oral)   Resp 18   Ht 5' 1" (1.549 m)   Wt 92 kg (202 lb 13.2 oz)   SpO2 96%   BMI 38.32 kg/m²      Physical Exam  Constitutional:       General: She is not in acute distress.     Appearance: Normal appearance. She is " "obese. She is not ill-appearing.   Cardiovascular:      Rate and Rhythm: Normal rate and regular rhythm.      Pulses: Normal pulses.      Heart sounds: Murmur heard.      No friction rub. No gallop.   Pulmonary:      Effort: Pulmonary effort is normal. No respiratory distress.      Breath sounds: Normal breath sounds. No wheezing.   Musculoskeletal:      Left upper leg: No swelling, tenderness or bony tenderness.      Right knee: Crepitus present. No swelling, erythema or bony tenderness. Tenderness present over the medial joint line.      Left knee: No swelling, erythema, bony tenderness or crepitus. No tenderness.      Right lower leg: No tenderness. No edema.      Left lower leg: No tenderness. No edema.   Skin:     General: Skin is warm and dry.      Coloration: Skin is not pale.      Findings: No erythema.   Neurological:      Mental Status: She is alert and oriented to person, place, and time.         Assessment & Plan     Problem List Items Addressed This Visit          Cardiac/Vascular    Heart murmur    Current Assessment & Plan     New murmur. Ref to cardiology.          Relevant Orders    Ambulatory referral/consult to Cardiology       Orthopedic    Chronic pain of right knee - Primary    Current Assessment & Plan     Repeating xray today.   Possible ortho or PT referral.   tx with nsaid for now.         Relevant Medications    diclofenac sodium (VOLTAREN) 1 % Gel    naproxen (NAPROSYN) 500 MG tablet    Other Relevant Orders    X-ray Knee Ortho Bilateral (Completed)     Other Visit Diagnoses       Acute pain of left knee        Relevant Medications    diclofenac sodium (VOLTAREN) 1 % Gel    naproxen (NAPROSYN) 500 MG tablet    Other Relevant Orders    X-ray Knee Ortho Bilateral (Completed)            Follow up if symptoms worsen or fail to improve.          Portions of this note may have been created with voice recognition software. Occasional "wrong-word" or "sound-a-like" substitutions may have occurred " due to the inherent limitations of voice recognition software. Please, read the note carefully and recognize, using context, where substitutions have occurred.

## 2023-09-20 DIAGNOSIS — Z76.89 ENCOUNTER TO ESTABLISH CARE: Primary | ICD-10-CM

## 2023-09-20 PROBLEM — M25.561 CHRONIC PAIN OF RIGHT KNEE: Status: ACTIVE | Noted: 2023-09-20

## 2023-09-20 PROBLEM — R01.1 HEART MURMUR: Status: ACTIVE | Noted: 2023-09-20

## 2023-09-20 PROBLEM — G89.29 CHRONIC PAIN OF RIGHT KNEE: Status: ACTIVE | Noted: 2023-09-20

## 2023-09-21 ENCOUNTER — OFFICE VISIT (OUTPATIENT)
Dept: INTERNAL MEDICINE | Facility: CLINIC | Age: 68
End: 2023-09-21
Payer: MEDICARE

## 2023-09-21 ENCOUNTER — LAB VISIT (OUTPATIENT)
Dept: LAB | Facility: HOSPITAL | Age: 68
End: 2023-09-21
Attending: NURSE PRACTITIONER
Payer: MEDICARE

## 2023-09-21 VITALS
TEMPERATURE: 98 F | OXYGEN SATURATION: 97 % | DIASTOLIC BLOOD PRESSURE: 76 MMHG | HEART RATE: 68 BPM | SYSTOLIC BLOOD PRESSURE: 142 MMHG | WEIGHT: 202.63 LBS | HEIGHT: 61 IN | BODY MASS INDEX: 38.26 KG/M2

## 2023-09-21 DIAGNOSIS — Z13.220 LIPID SCREENING: ICD-10-CM

## 2023-09-21 DIAGNOSIS — R01.1 HEART MURMUR: ICD-10-CM

## 2023-09-21 DIAGNOSIS — Z00.00 ROUTINE ADULT HEALTH MAINTENANCE: ICD-10-CM

## 2023-09-21 DIAGNOSIS — Z13.1 DIABETES MELLITUS SCREENING: ICD-10-CM

## 2023-09-21 DIAGNOSIS — E66.01 SEVERE OBESITY (BMI 35.0-39.9) WITH COMORBIDITY: ICD-10-CM

## 2023-09-21 DIAGNOSIS — Z13.29 THYROID DISORDER SCREENING: ICD-10-CM

## 2023-09-21 DIAGNOSIS — D55.0: Chronic | ICD-10-CM

## 2023-09-21 DIAGNOSIS — Z23 NEED FOR PNEUMOCOCCAL VACCINATION: ICD-10-CM

## 2023-09-21 DIAGNOSIS — R03.0 ELEVATED BP WITHOUT DIAGNOSIS OF HYPERTENSION: ICD-10-CM

## 2023-09-21 DIAGNOSIS — Z00.00 ROUTINE ADULT HEALTH MAINTENANCE: Primary | ICD-10-CM

## 2023-09-21 PROBLEM — Z86.16 HISTORY OF COVID-19: Status: RESOLVED | Noted: 2021-02-18 | Resolved: 2023-09-21

## 2023-09-21 LAB
ALBUMIN SERPL BCP-MCNC: 3.6 G/DL (ref 3.5–5.2)
ALP SERPL-CCNC: 80 U/L (ref 55–135)
ALT SERPL W/O P-5'-P-CCNC: 19 U/L (ref 10–44)
ANION GAP SERPL CALC-SCNC: 8 MMOL/L (ref 8–16)
AST SERPL-CCNC: 16 U/L (ref 10–40)
BASOPHILS # BLD AUTO: 0.05 K/UL (ref 0–0.2)
BASOPHILS NFR BLD: 0.8 % (ref 0–1.9)
BILIRUB SERPL-MCNC: 0.5 MG/DL (ref 0.1–1)
BUN SERPL-MCNC: 14 MG/DL (ref 8–23)
CALCIUM SERPL-MCNC: 9.5 MG/DL (ref 8.7–10.5)
CHLORIDE SERPL-SCNC: 107 MMOL/L (ref 95–110)
CHOLEST SERPL-MCNC: 163 MG/DL (ref 120–199)
CHOLEST/HDLC SERPL: 4 {RATIO} (ref 2–5)
CO2 SERPL-SCNC: 28 MMOL/L (ref 23–29)
CREAT SERPL-MCNC: 0.9 MG/DL (ref 0.5–1.4)
DIFFERENTIAL METHOD: ABNORMAL
EOSINOPHIL # BLD AUTO: 0.2 K/UL (ref 0–0.5)
EOSINOPHIL NFR BLD: 3.4 % (ref 0–8)
ERYTHROCYTE [DISTWIDTH] IN BLOOD BY AUTOMATED COUNT: 13.7 % (ref 11.5–14.5)
EST. GFR  (NO RACE VARIABLE): >60 ML/MIN/1.73 M^2
ESTIMATED AVG GLUCOSE: 94 MG/DL (ref 68–131)
GLUCOSE SERPL-MCNC: 96 MG/DL (ref 70–110)
HBA1C MFR BLD: 4.9 % (ref 4–5.6)
HCT VFR BLD AUTO: 34.4 % (ref 37–48.5)
HDLC SERPL-MCNC: 41 MG/DL (ref 40–75)
HDLC SERPL: 25.2 % (ref 20–50)
HGB BLD-MCNC: 11.1 G/DL (ref 12–16)
IMM GRANULOCYTES # BLD AUTO: 0.01 K/UL (ref 0–0.04)
IMM GRANULOCYTES NFR BLD AUTO: 0.2 % (ref 0–0.5)
LDLC SERPL CALC-MCNC: 109.6 MG/DL (ref 63–159)
LYMPHOCYTES # BLD AUTO: 1.4 K/UL (ref 1–4.8)
LYMPHOCYTES NFR BLD: 22 % (ref 18–48)
MCH RBC QN AUTO: 27.3 PG (ref 27–31)
MCHC RBC AUTO-ENTMCNC: 32.3 G/DL (ref 32–36)
MCV RBC AUTO: 85 FL (ref 82–98)
MONOCYTES # BLD AUTO: 0.6 K/UL (ref 0.3–1)
MONOCYTES NFR BLD: 8.5 % (ref 4–15)
NEUTROPHILS # BLD AUTO: 4.2 K/UL (ref 1.8–7.7)
NEUTROPHILS NFR BLD: 65.1 % (ref 38–73)
NONHDLC SERPL-MCNC: 122 MG/DL
NRBC BLD-RTO: 0 /100 WBC
PLATELET # BLD AUTO: 284 K/UL (ref 150–450)
PMV BLD AUTO: 10.1 FL (ref 9.2–12.9)
POTASSIUM SERPL-SCNC: 5.2 MMOL/L (ref 3.5–5.1)
PROT SERPL-MCNC: 7.3 G/DL (ref 6–8.4)
RBC # BLD AUTO: 4.06 M/UL (ref 4–5.4)
SODIUM SERPL-SCNC: 143 MMOL/L (ref 136–145)
TRIGL SERPL-MCNC: 62 MG/DL (ref 30–150)
TSH SERPL DL<=0.005 MIU/L-ACNC: 1.6 UIU/ML (ref 0.4–4)
WBC # BLD AUTO: 6.49 K/UL (ref 3.9–12.7)

## 2023-09-21 PROCEDURE — 80053 COMPREHEN METABOLIC PANEL: CPT | Mod: PO | Performed by: NURSE PRACTITIONER

## 2023-09-21 PROCEDURE — 84443 ASSAY THYROID STIM HORMONE: CPT | Mod: PO | Performed by: NURSE PRACTITIONER

## 2023-09-21 PROCEDURE — 36415 COLL VENOUS BLD VENIPUNCTURE: CPT | Mod: PO | Performed by: NURSE PRACTITIONER

## 2023-09-21 PROCEDURE — 99999 PR PBB SHADOW E&M-EST. PATIENT-LVL IV: ICD-10-PCS | Mod: PBBFAC,,, | Performed by: NURSE PRACTITIONER

## 2023-09-21 PROCEDURE — 99397 PR PREVENTIVE VISIT,EST,65 & OVER: ICD-10-PCS | Mod: S$PBB,GZ,, | Performed by: NURSE PRACTITIONER

## 2023-09-21 PROCEDURE — 99999 PR PBB SHADOW E&M-EST. PATIENT-LVL IV: CPT | Mod: PBBFAC,,, | Performed by: NURSE PRACTITIONER

## 2023-09-21 PROCEDURE — 99214 OFFICE O/P EST MOD 30 MIN: CPT | Mod: PBBFAC,PO | Performed by: NURSE PRACTITIONER

## 2023-09-21 PROCEDURE — 80061 LIPID PANEL: CPT | Performed by: NURSE PRACTITIONER

## 2023-09-21 PROCEDURE — 85025 COMPLETE CBC W/AUTO DIFF WBC: CPT | Mod: PO | Performed by: NURSE PRACTITIONER

## 2023-09-21 PROCEDURE — 99397 PER PM REEVAL EST PAT 65+ YR: CPT | Mod: S$PBB,GZ,, | Performed by: NURSE PRACTITIONER

## 2023-09-21 PROCEDURE — 83036 HEMOGLOBIN GLYCOSYLATED A1C: CPT | Performed by: NURSE PRACTITIONER

## 2023-09-21 NOTE — PROGRESS NOTES
Subjective:       Patient ID: Bonnie Vazquez is a 67 y.o. female.    Chief Complaint: Annual Exam    Mrs. Vazquez presents to visit for annual wellness exam. She has no acute complaints today, continues to have leg pain from previous visit. Improves with diclofenac gel and naproxen. GYN and mammo up to date. RUBEN for [facility] signed at last visit.   Walks at work, but nothing outside of work. Admits to poor diet.   Regular dental exams.   Regular eye exams with [facility].   Would like to get pneumococcal vaccine, but will have to return due to lack of supply in office.   Has upcoming appt for murmur noted during last visit.         Patient Active Problem List   Diagnosis    G6PD deficiency    Anemia    Special screening for malignant neoplasms, colon    Colon polyp    Lipoma of colon    Elevated BP without diagnosis of hypertension    Porcelain gallbladder    Acute right-sided low back pain with right-sided sciatica    Severe obesity (BMI 35.0-39.9) with comorbidity    Chronic rhinitis    Heart murmur    Chronic pain of right knee       Family History   Problem Relation Age of Onset    Heart disease Mother     Cataracts Mother     Diabetes Father     Diabetes Brother     Breast cancer Neg Hx     Colon cancer Neg Hx     Ovarian cancer Neg Hx     Thrombophilia Neg Hx      Past Surgical History:   Procedure Laterality Date    COLONOSCOPY N/A 3/7/2019    Procedure: COLONOSCOPY;  Surgeon: Jerry Sawant MD;  Location: [facility] ENDO;  Service: Endoscopy;  Laterality: N/A;    HYSTERECTOMY      benign    KIDNEY STONE SURGERY      LAPAROSCOPIC LYSIS OF ADHESIONS N/A 2/20/2020    Procedure: LYSIS, ADHESIONS, LAPAROSCOPIC;  Surgeon: Dawson Elizalde MD;  Location: [facility] OR;  Service: General;  Laterality: N/A;    ROBOT-ASSISTED CHOLECYSTECTOMY USING DA DAVINA XI N/A 2/20/2020    Procedure: XI ROBOTIC CHOLECYSTECTOMY;  Surgeon: Dawson Elizalde MD;  Location: [facility] OR;  Service: General;  Laterality: N/A;         Current  "Outpatient Medications:     diclofenac sodium (VOLTAREN) 1 % Gel, Apply 2 g topically 2 (two) times daily as needed., Disp: 100 g, Rfl: 0    multivitamin capsule, Take 1 capsule by mouth once daily., Disp: , Rfl:     naproxen (NAPROSYN) 500 MG tablet, Take 1 tablet (500 mg total) by mouth 2 (two) times daily as needed (pain)., Disp: 30 tablet, Rfl: 0    ascorbic acid, vitamin C, (VITAMIN C) 100 MG tablet, Take 100 mg by mouth daily as needed., Disp: , Rfl:     biotin 1 mg tablet, Take 1,000 mcg by mouth 3 (three) times daily., Disp: , Rfl:     calcium-vitamin D3 (OS-JEREMI 500 + D3) 500 mg(1,250mg) -200 unit per tablet, Take 1 tablet by mouth 2 (two) times daily with meals., Disp: , Rfl:     ELDERBERRY FRUIT ORAL, Take by mouth., Disp: , Rfl:     ferrous sulfate (IRON ORAL), Take 1 tablet by mouth once daily., Disp: , Rfl:     Review of Systems   Constitutional:  Negative for appetite change, chills, fatigue and fever.   Eyes:  Negative for visual disturbance.   Respiratory:  Negative for cough and shortness of breath.    Cardiovascular:  Negative for chest pain, palpitations and leg swelling.   Gastrointestinal:  Negative for abdominal pain, blood in stool, constipation, diarrhea, nausea and vomiting.   Endocrine: Negative for polydipsia, polyphagia and polyuria.   Genitourinary:  Negative for dysuria, frequency and urgency.   Musculoskeletal:  Positive for arthralgias.   Neurological:  Negative for dizziness, syncope, light-headedness and headaches.   Psychiatric/Behavioral:  Negative for dysphoric mood. The patient is not nervous/anxious.        Objective:   BP (!) 142/76 (BP Location: Left arm, Patient Position: Sitting, BP Method: Medium (Manual))   Pulse 68   Temp 98.1 °F (36.7 °C) (Temporal)   Ht 5' 1" (1.549 m)   Wt 91.9 kg (202 lb 9.6 oz)   SpO2 97%   BMI 38.28 kg/m²      Physical Exam  Constitutional:       General: She is not in acute distress.     Appearance: Normal appearance. She is obese. She is " not ill-appearing.   HENT:      Head: Normocephalic.      Right Ear: Tympanic membrane normal.      Left Ear: There is impacted cerumen.      Nose: Nose normal. No congestion or rhinorrhea.      Mouth/Throat:      Mouth: Mucous membranes are moist.      Pharynx: Oropharynx is clear. No oropharyngeal exudate or posterior oropharyngeal erythema.   Eyes:      General:         Right eye: No discharge.         Left eye: No discharge.      Extraocular Movements: Extraocular movements intact.      Conjunctiva/sclera: Conjunctivae normal.      Pupils: Pupils are equal, round, and reactive to light.   Cardiovascular:      Rate and Rhythm: Normal rate and regular rhythm.      Pulses: Normal pulses.      Heart sounds: Murmur heard.      No friction rub. No gallop.   Pulmonary:      Effort: Pulmonary effort is normal. No respiratory distress.      Breath sounds: Normal breath sounds. No wheezing.   Abdominal:      Palpations: Abdomen is soft.      Tenderness: There is no abdominal tenderness. There is no guarding.   Musculoskeletal:      Right lower leg: No edema.      Left lower leg: No edema.   Skin:     General: Skin is warm and dry.      Coloration: Skin is not pale.      Findings: No erythema.   Neurological:      Mental Status: She is alert and oriented to person, place, and time.   Psychiatric:         Mood and Affect: Mood normal.         Behavior: Behavior normal.         Assessment & Plan     Problem List Items Addressed This Visit          Cardiac/Vascular    Elevated BP without diagnosis of hypertension    Current Assessment & Plan     Follow up in two weeks for nurse visit. Previously controlled at last visit.          Heart murmur    Current Assessment & Plan     Upcoming appt with cardio.             Oncology    Anemia (Chronic)    Current Assessment & Plan     Labs today. No acute signs of bleeding.             Endocrine    Severe obesity (BMI 35.0-39.9) with comorbidity    Current Assessment & Plan      "Counseled on importance of diet and exercise in order to improve overall quality of life, and reduce risk of future comorbidities.            Other Visit Diagnoses       Routine adult health maintenance    -  Primary    Relevant Orders    CBC Auto Differential    COMPREHENSIVE METABOLIC PANEL    Hemoglobin A1C    Lipid Panel    TSH    Need for pneumococcal vaccination        Relevant Orders    (In Office Administered) Pneumococcal Conjugate Vaccine (20 Valent) (IM)    Thyroid disorder screening        Relevant Orders    TSH    Lipid screening        Relevant Orders    Lipid Panel    Diabetes mellitus screening        Relevant Orders    Hemoglobin A1C        No concerns on physical exam except new murmur. Appt on Monday with cardio.    Follow up in about 2 weeks (around 10/5/2023) for hypertension nurse visit. .          Portions of this note may have been created with voice recognition software. Occasional "wrong-word" or "sound-a-like" substitutions may have occurred due to the inherent limitations of voice recognition software. Please, read the note carefully and recognize, using context, where substitutions have occurred.       "

## 2023-09-22 DIAGNOSIS — E87.5 HYPERKALEMIA: Primary | ICD-10-CM

## 2023-09-25 ENCOUNTER — OFFICE VISIT (OUTPATIENT)
Dept: CARDIOLOGY | Facility: CLINIC | Age: 68
End: 2023-09-25
Payer: MEDICARE

## 2023-09-25 ENCOUNTER — HOSPITAL ENCOUNTER (OUTPATIENT)
Dept: CARDIOLOGY | Facility: HOSPITAL | Age: 68
Discharge: HOME | End: 2023-09-25
Attending: INTERNAL MEDICINE
Payer: MEDICARE

## 2023-09-25 VITALS — BODY MASS INDEX: 38.24 KG/M2 | WEIGHT: 202.38 LBS

## 2023-09-25 VITALS
SYSTOLIC BLOOD PRESSURE: 138 MMHG | HEIGHT: 61 IN | OXYGEN SATURATION: 98 % | WEIGHT: 202.38 LBS | DIASTOLIC BLOOD PRESSURE: 78 MMHG | HEART RATE: 58 BPM | BODY MASS INDEX: 38.21 KG/M2

## 2023-09-25 DIAGNOSIS — Z76.89 ENCOUNTER TO ESTABLISH CARE: ICD-10-CM

## 2023-09-25 DIAGNOSIS — R01.1 HEART MURMUR: ICD-10-CM

## 2023-09-25 DIAGNOSIS — E66.01 SEVERE OBESITY (BMI 35.0-39.9) WITH COMORBIDITY: ICD-10-CM

## 2023-09-25 DIAGNOSIS — R94.31 ABNORMAL ECG: Primary | ICD-10-CM

## 2023-09-25 DIAGNOSIS — M79.89 SWELLING OF LOWER EXTREMITY: ICD-10-CM

## 2023-09-25 DIAGNOSIS — I10 PRIMARY HYPERTENSION: ICD-10-CM

## 2023-09-25 PROCEDURE — 99999 PR PBB SHADOW E&M-EST. PATIENT-LVL IV: ICD-10-PCS | Mod: PBBFAC,,, | Performed by: INTERNAL MEDICINE

## 2023-09-25 PROCEDURE — 99999 PR PBB SHADOW E&M-EST. PATIENT-LVL IV: CPT | Mod: PBBFAC,,, | Performed by: INTERNAL MEDICINE

## 2023-09-25 PROCEDURE — 93010 ELECTROCARDIOGRAM REPORT: CPT | Mod: ,,, | Performed by: INTERNAL MEDICINE

## 2023-09-25 PROCEDURE — 93005 ELECTROCARDIOGRAM TRACING: CPT

## 2023-09-25 PROCEDURE — 93010 EKG 12-LEAD: ICD-10-PCS | Mod: ,,, | Performed by: INTERNAL MEDICINE

## 2023-09-25 PROCEDURE — 99204 PR OFFICE/OUTPT VISIT, NEW, LEVL IV, 45-59 MIN: ICD-10-PCS | Mod: S$PBB,,, | Performed by: INTERNAL MEDICINE

## 2023-09-25 PROCEDURE — 99204 OFFICE O/P NEW MOD 45 MIN: CPT | Mod: S$PBB,,, | Performed by: INTERNAL MEDICINE

## 2023-09-25 PROCEDURE — 99214 OFFICE O/P EST MOD 30 MIN: CPT | Mod: PBBFAC | Performed by: INTERNAL MEDICINE

## 2023-09-25 RX ORDER — HYDROCHLOROTHIAZIDE 12.5 MG/1
12.5 TABLET ORAL DAILY
Qty: 30 TABLET | Refills: 11 | Status: SHIPPED | OUTPATIENT
Start: 2023-09-25 | End: 2024-09-24

## 2023-09-25 NOTE — PROGRESS NOTES
Subjective:   Patient ID:  Bonnie Vazquez is a 67 y.o. female who presents for evaluation of No chief complaint on file.      HPI  67-year-old female with history below.    Never smoker.    Comes in for evaluation of a cardiac murmur heard on exam by PCP.    She does have a systolic 1 to 2/6 murmur.    She denies any chest pain, orthopnea or PND.    She does have history of elevated blood pressure but no history of hypertension.    She has 2+ pitting edema in her lower extremities.    She states that she eats regular.  No specific low salt restriction.      Past Medical History:   Diagnosis Date    Anemia     Arthritis     G6PD deficiency     History of other drug therapy 9/7/2021 1:12:05 PM    [facility] Historical - Unknown: COVID-19 vaccine series completed-Moderna    History of other drug therapy 9/7/2021 1:12:05 PM    [facility] Historical - Unknown: COVID-19 vaccine series completed-Moderna    Kidney stone     Other diseases of respiratory system 9/7/2021 1:12:18 PM    [facility] Historical - Unknown: COVID-19 virus infection-No Additional Notes    Other diseases of respiratory system 9/7/2021 1:12:18 PM    [facility] Historical - Unknown: COVID-19 virus infection-No Additional Notes       Past Surgical History:   Procedure Laterality Date    COLONOSCOPY N/A 3/7/2019    Procedure: COLONOSCOPY;  Surgeon: Jerry Sawant MD;  Location: [facility] ENDO;  Service: Endoscopy;  Laterality: N/A;    HYSTERECTOMY      benign    KIDNEY STONE SURGERY      LAPAROSCOPIC LYSIS OF ADHESIONS N/A 2/20/2020    Procedure: LYSIS, ADHESIONS, LAPAROSCOPIC;  Surgeon: Dawson Elizalde MD;  Location: [facility] OR;  Service: General;  Laterality: N/A;    ROBOT-ASSISTED CHOLECYSTECTOMY USING DA DAVINA XI N/A 2/20/2020    Procedure: XI ROBOTIC CHOLECYSTECTOMY;  Surgeon: Dawson Elizalde MD;  Location: [facility] OR;  Service: General;  Laterality: N/A;       Social History     Tobacco Use    Smoking status: Never    Smokeless tobacco: Never   Substance  Use Topics    Alcohol use: Yes     Alcohol/week: 0.0 standard drinks of alcohol     Comment: socially  No alcohol 72h prior to sx     Drug use: No       Family History   Problem Relation Age of Onset    Heart disease Mother     Cataracts Mother     Diabetes Father     Diabetes Brother     Breast cancer Neg Hx     Colon cancer Neg Hx     Ovarian cancer Neg Hx     Thrombophilia Neg Hx        Review of Systems   Cardiovascular:  Negative for chest pain, dyspnea on exertion, palpitations and syncope.   Genitourinary: Negative.    Neurological: Negative.        Current Outpatient Medications on File Prior to Visit   Medication Sig    ascorbic acid, vitamin C, (VITAMIN C) 100 MG tablet Take 100 mg by mouth daily as needed.    biotin 1 mg tablet Take 1,000 mcg by mouth 3 (three) times daily.    calcium-vitamin D3 (OS-JEREMI 500 + D3) 500 mg(1,250mg) -200 unit per tablet Take 1 tablet by mouth 2 (two) times daily with meals.    diclofenac sodium (VOLTAREN) 1 % Gel Apply 2 g topically 2 (two) times daily as needed.    ELDERBERRY FRUIT ORAL Take by mouth.    ferrous sulfate (IRON ORAL) Take 1 tablet by mouth once daily.    multivitamin capsule Take 1 capsule by mouth once daily.    naproxen (NAPROSYN) 500 MG tablet Take 1 tablet (500 mg total) by mouth 2 (two) times daily as needed (pain).     No current facility-administered medications on file prior to visit.       Objective:   Objective:  Wt Readings from Last 3 Encounters:   09/25/23 91.8 kg (202 lb 6.1 oz)   09/25/23 91.8 kg (202 lb 6.1 oz)   09/21/23 91.9 kg (202 lb 9.6 oz)     Temp Readings from Last 3 Encounters:   09/21/23 98.1 °F (36.7 °C) (Temporal)   09/19/23 97 °F (36.1 °C) (Oral)   09/15/22 97.4 °F (36.3 °C)     BP Readings from Last 3 Encounters:   09/25/23 138/78   09/21/23 (!) 142/76   09/19/23 126/80     Pulse Readings from Last 3 Encounters:   09/25/23 (!) 58   09/21/23 68   09/19/23 97       Physical Exam  Vitals reviewed.   Constitutional:       Appearance:  She is well-developed.   Neck:      Vascular: No carotid bruit.   Cardiovascular:      Rate and Rhythm: Normal rate and regular rhythm.      Pulses: Intact distal pulses.      Heart sounds: Normal heart sounds. No murmur heard.  Pulmonary:      Breath sounds: Normal breath sounds.   Musculoskeletal:         General: Swelling present.   Neurological:      Mental Status: She is oriented to person, place, and time.         Lab Results   Component Value Date    CHOL 163 09/21/2023    CHOL 167 01/25/2022    CHOL 173 09/19/2019     Lab Results   Component Value Date    HDL 41 09/21/2023    HDL 46 01/25/2022    HDL 40 09/19/2019     Lab Results   Component Value Date    LDLCALC 109.6 09/21/2023    LDLCALC 105.8 01/25/2022    LDLCALC 122.0 09/19/2019     Lab Results   Component Value Date    TRIG 62 09/21/2023    TRIG 76 01/25/2022    TRIG 55 09/19/2019     Lab Results   Component Value Date    CHOLHDL 25.2 09/21/2023    CHOLHDL 27.5 01/25/2022    CHOLHDL 23.1 09/19/2019       Chemistry        Component Value Date/Time     09/21/2023 0948    K 5.2 (H) 09/21/2023 0948     09/21/2023 0948    CO2 28 09/21/2023 0948    BUN 14 09/21/2023 0948    CREATININE 0.9 09/21/2023 0948    GLU 96 09/21/2023 0948        Component Value Date/Time    CALCIUM 9.5 09/21/2023 0948    ALKPHOS 80 09/21/2023 0948    AST 16 09/21/2023 0948    ALT 19 09/21/2023 0948    BILITOT 0.5 09/21/2023 0948    ESTGFRAFRICA >60.0 01/25/2022 1701    EGFRNONAA 53.2 (A) 01/25/2022 1701          Lab Results   Component Value Date    TSH 1.597 09/21/2023     Lab Results   Component Value Date    INR 1.0 04/19/2010     Lab Results   Component Value Date    WBC 6.49 09/21/2023    HGB 11.1 (L) 09/21/2023    HCT 34.4 (L) 09/21/2023    MCV 85 09/21/2023     09/21/2023     BNP  @LABRCNTIP(BNP,BNPTRIAGEBLO)@  Estimated Creatinine Clearance: 64.3 mL/min (based on SCr of 0.9 mg/dL).     Imaging:  ======    No results found for this or any previous  visit.    No results found for this or any previous visit.    Results for orders placed during the hospital encounter of 01/14/20    X-Ray Chest PA And Lateral    Narrative  EXAMINATION:  XR CHEST PA AND LATERAL    CLINICAL HISTORY:  Other specified diseases of gallbladder    TECHNIQUE:  PA and lateral views of the chest were performed.    COMPARISON:  Chest x-ray from 04/19/2010    FINDINGS:  Clear lungs.  No effusion.  Cardiomediastinal silhouette and osseous structures are stable in appearance with degenerative changes of the spine noted.    Impression  Stable chest.  No active disease.      Electronically signed by: Alexi Carr MD  Date:    01/14/2020  Time:    11:10    No results found for this or any previous visit.    No valid procedures specified.    No results found for this or any previous visit.      No results found for this or any previous visit.      No results found for this or any previous visit.      Diagnostic Results:  ECG: Reviewed    The 10-year ASCVD risk score (Monica MEDRANO, et al., 2019) is: 9.7%    Values used to calculate the score:      Age: 67 years      Sex: Female      Is Non- : Yes      Diabetic: No      Tobacco smoker: No      Systolic Blood Pressure: 138 mmHg      Is BP treated: Yes      HDL Cholesterol: 41 mg/dL      Total Cholesterol: 163 mg/dL  Sinus bradycardia   Nonspecific T wave abnormality   Abnormal ECG   When compared with ECG of 14-JAN-2020 12:59,   No significant change was found     Referred By: ISSAC MILLIGAN          Assessment and Plan:   Abnormal ECG  -     Echo; Future    Heart murmur  -     Ambulatory referral/consult to Cardiology  -     Echo; Future    Primary hypertension  -     hydroCHLOROthiazide (HYDRODIURIL) 12.5 MG Tab; Take 1 tablet (12.5 mg total) by mouth once daily.  Dispense: 30 tablet; Refill: 11    Severe obesity (BMI 35.0-39.9) with comorbidity    Swelling of lower extremity      Start HCTZ.  Check echo  Reviewed all tests  and above medical conditions with patient in detail and formulated treatment plan.  Risk factor modification discussed.   Cardiac low salt diet discussed.  Maintaining healthy weight and weight loss goals were discussed in clinic.    Follow up in  6 months

## 2023-09-27 ENCOUNTER — HOSPITAL ENCOUNTER (OUTPATIENT)
Dept: CARDIOLOGY | Facility: HOSPITAL | Age: 68
Discharge: HOME | End: 2023-09-27
Attending: INTERNAL MEDICINE
Payer: MEDICARE

## 2023-09-27 ENCOUNTER — PATIENT OUTREACH (OUTPATIENT)
Dept: ADMINISTRATIVE | Facility: HOSPITAL | Age: 68
End: 2023-09-27
Payer: MEDICARE

## 2023-09-27 VITALS
HEIGHT: 61 IN | WEIGHT: 202 LBS | DIASTOLIC BLOOD PRESSURE: 78 MMHG | BODY MASS INDEX: 38.14 KG/M2 | SYSTOLIC BLOOD PRESSURE: 138 MMHG

## 2023-09-27 DIAGNOSIS — R94.31 ABNORMAL ECG: ICD-10-CM

## 2023-09-27 DIAGNOSIS — R01.1 HEART MURMUR: ICD-10-CM

## 2023-09-27 LAB
AORTIC ROOT ANNULUS: 2.5 CM
ASCENDING AORTA: 2.55 CM
AV INDEX (PROSTH): 0.75
AV MEAN GRADIENT: 6 MMHG
AV PEAK GRADIENT: 11 MMHG
AV VALVE AREA BY VELOCITY RATIO: 2.14 CM²
AV VALVE AREA: 2.2 CM²
AV VELOCITY RATIO: 0.73
BSA FOR ECHO PROCEDURE: 1.99 M2
CV ECHO LV RWT: 0.44 CM
DOP CALC AO PEAK VEL: 1.68 M/S
DOP CALC AO VTI: 39.4 CM
DOP CALC LVOT AREA: 2.9 CM2
DOP CALC LVOT DIAMETER: 1.93 CM
DOP CALC LVOT PEAK VEL: 1.23 M/S
DOP CALC LVOT STROKE VOLUME: 86.55 CM3
DOP CALC RVOT PEAK VEL: 0.77 M/S
DOP CALC RVOT VTI: 20.5 CM
DOP CALCLVOT PEAK VEL VTI: 29.6 CM
E WAVE DECELERATION TIME: 155.23 MSEC
E/A RATIO: 0.97
E/E' RATIO: 8.3 M/S
ECHO LV POSTERIOR WALL: 1.05 CM (ref 0.6–1.1)
FRACTIONAL SHORTENING: 35 % (ref 28–44)
INTERVENTRICULAR SEPTUM: 1.09 CM (ref 0.6–1.1)
IVC DIAMETER: 1.49 CM
IVRT: 85.63 MSEC
LA MAJOR: 4.98 CM
LA MINOR: 4.4 CM
LA WIDTH: 3.6 CM
LEFT ATRIUM SIZE: 3.03 CM
LEFT ATRIUM VOLUME INDEX MOD: 26.2 ML/M2
LEFT ATRIUM VOLUME INDEX: 22.8 ML/M2
LEFT ATRIUM VOLUME MOD: 49.85 CM3
LEFT ATRIUM VOLUME: 43.32 CM3
LEFT INTERNAL DIMENSION IN SYSTOLE: 3.07 CM (ref 2.1–4)
LEFT VENTRICLE DIASTOLIC VOLUME INDEX: 55.13 ML/M2
LEFT VENTRICLE DIASTOLIC VOLUME: 104.74 ML
LEFT VENTRICLE MASS INDEX: 97 G/M2
LEFT VENTRICLE SYSTOLIC VOLUME INDEX: 19.5 ML/M2
LEFT VENTRICLE SYSTOLIC VOLUME: 37.05 ML
LEFT VENTRICULAR INTERNAL DIMENSION IN DIASTOLE: 4.75 CM (ref 3.5–6)
LEFT VENTRICULAR MASS: 183.57 G
LV LATERAL E/E' RATIO: 9.22 M/S
LV SEPTAL E/E' RATIO: 7.55 M/S
LVOT MG: 3.42 MMHG
LVOT MV: 0.88 CM/S
MV PEAK A VEL: 0.86 M/S
MV PEAK E VEL: 0.83 M/S
MV STENOSIS PRESSURE HALF TIME: 45.02 MS
MV VALVE AREA P 1/2 METHOD: 4.89 CM2
PISA TR MAX VEL: 2.16 M/S
PULM VEIN S/D RATIO: 2
PV MEAN GRADIENT: 2 MMHG
PV PEAK D VEL: 0.27 M/S
PV PEAK GRADIENT: 3 MMHG
PV PEAK S VEL: 0.54 M/S
PV PEAK VELOCITY: 0.91 M/S
RA MAJOR: 3.89 CM
RA PRESSURE ESTIMATED: 3 MMHG
RA WIDTH: 3.02 CM
RIGHT VENTRICULAR END-DIASTOLIC DIMENSION: 3.15 CM
RV TB RVSP: 5 MMHG
SINUS: 2.57 CM
STJ: 2.31 CM
TDI LATERAL: 0.09 M/S
TDI SEPTAL: 0.11 M/S
TDI: 0.1 M/S
TR MAX PG: 19 MMHG
TRICUSPID ANNULAR PLANE SYSTOLIC EXCURSION: 1.84 CM
TV REST PULMONARY ARTERY PRESSURE: 22 MMHG
Z-SCORE OF LEFT VENTRICULAR DIMENSION IN END DIASTOLE: -1.16
Z-SCORE OF LEFT VENTRICULAR DIMENSION IN END SYSTOLE: -0.54

## 2023-09-27 PROCEDURE — 93306 TTE W/DOPPLER COMPLETE: CPT | Mod: 26,,, | Performed by: INTERNAL MEDICINE

## 2023-09-27 PROCEDURE — 93306 ECHO (CUPID ONLY): ICD-10-PCS | Mod: 26,,, | Performed by: INTERNAL MEDICINE

## 2023-09-27 PROCEDURE — 93306 TTE W/DOPPLER COMPLETE: CPT

## 2023-10-05 ENCOUNTER — CLINICAL SUPPORT (OUTPATIENT)
Dept: INTERNAL MEDICINE | Facility: CLINIC | Age: 68
End: 2023-10-05
Payer: MEDICARE

## 2023-10-05 VITALS — DIASTOLIC BLOOD PRESSURE: 60 MMHG | SYSTOLIC BLOOD PRESSURE: 126 MMHG

## 2023-10-05 DIAGNOSIS — I10 HYPERTENSION, UNSPECIFIED TYPE: Primary | ICD-10-CM

## 2023-10-05 PROCEDURE — 99999 PR PBB SHADOW E&M-EST. PATIENT-LVL I: ICD-10-PCS | Mod: PBBFAC,,,

## 2023-10-05 PROCEDURE — 99999 PR PBB SHADOW E&M-EST. PATIENT-LVL I: CPT | Mod: PBBFAC,,,

## 2023-10-05 PROCEDURE — 99211 OFF/OP EST MAY X REQ PHY/QHP: CPT | Mod: PBBFAC,PO

## 2023-10-07 ENCOUNTER — HOSPITAL ENCOUNTER (EMERGENCY)
Facility: HOSPITAL | Age: 68
Discharge: HOME | End: 2023-10-07
Attending: EMERGENCY MEDICINE
Payer: MEDICARE

## 2023-10-07 VITALS
DIASTOLIC BLOOD PRESSURE: 74 MMHG | HEART RATE: 57 BPM | TEMPERATURE: 99 F | WEIGHT: 195.88 LBS | OXYGEN SATURATION: 98 % | RESPIRATION RATE: 18 BRPM | BODY MASS INDEX: 37.01 KG/M2 | SYSTOLIC BLOOD PRESSURE: 168 MMHG

## 2023-10-07 DIAGNOSIS — R51.9 NONINTRACTABLE HEADACHE, UNSPECIFIED CHRONICITY PATTERN, UNSPECIFIED HEADACHE TYPE: Primary | ICD-10-CM

## 2023-10-07 DIAGNOSIS — I10 HYPERTENSION, UNSPECIFIED TYPE: ICD-10-CM

## 2023-10-07 DIAGNOSIS — E87.5 HYPERKALEMIA: ICD-10-CM

## 2023-10-07 LAB
ANION GAP SERPL CALC-SCNC: 12 MMOL/L (ref 8–16)
BUN SERPL-MCNC: 15 MG/DL (ref 8–23)
CALCIUM SERPL-MCNC: 9.7 MG/DL (ref 8.7–10.5)
CHLORIDE SERPL-SCNC: 100 MMOL/L (ref 95–110)
CO2 SERPL-SCNC: 25 MMOL/L (ref 23–29)
CREAT SERPL-MCNC: 1 MG/DL (ref 0.5–1.4)
EST. GFR  (NO RACE VARIABLE): >60 ML/MIN/1.73 M^2
GLUCOSE SERPL-MCNC: 88 MG/DL (ref 70–110)
POTASSIUM SERPL-SCNC: 4.1 MMOL/L (ref 3.5–5.1)
SODIUM SERPL-SCNC: 137 MMOL/L (ref 136–145)

## 2023-10-07 PROCEDURE — 99283 EMERGENCY DEPT VISIT LOW MDM: CPT | Mod: ER

## 2023-10-07 PROCEDURE — 80048 BASIC METABOLIC PNL TOTAL CA: CPT | Mod: ER | Performed by: EMERGENCY MEDICINE

## 2023-10-07 PROCEDURE — 25000003 PHARM REV CODE 250: Mod: ER | Performed by: EMERGENCY MEDICINE

## 2023-10-07 RX ORDER — AMLODIPINE BESYLATE 5 MG/1
5 TABLET ORAL DAILY
Qty: 30 TABLET | Refills: 1 | Status: SHIPPED | OUTPATIENT
Start: 2023-10-07 | End: 2023-10-19

## 2023-10-07 RX ORDER — HYDROCHLOROTHIAZIDE 25 MG/1
25 TABLET ORAL
Status: COMPLETED | OUTPATIENT
Start: 2023-10-07 | End: 2023-10-07

## 2023-10-07 RX ORDER — NAPROXEN 500 MG/1
500 TABLET ORAL
Status: COMPLETED | OUTPATIENT
Start: 2023-10-07 | End: 2023-10-07

## 2023-10-07 RX ORDER — AMLODIPINE BESYLATE 5 MG/1
5 TABLET ORAL
Status: COMPLETED | OUTPATIENT
Start: 2023-10-07 | End: 2023-10-07

## 2023-10-07 RX ORDER — ACETAMINOPHEN 500 MG
1000 TABLET ORAL
Status: COMPLETED | OUTPATIENT
Start: 2023-10-07 | End: 2023-10-07

## 2023-10-07 RX ADMIN — AMLODIPINE BESYLATE 5 MG: 5 TABLET ORAL at 02:10

## 2023-10-07 RX ADMIN — HYDROCHLOROTHIAZIDE 25 MG: 25 TABLET ORAL at 01:10

## 2023-10-07 RX ADMIN — ACETAMINOPHEN 1000 MG: 500 TABLET ORAL at 01:10

## 2023-10-07 RX ADMIN — NAPROXEN 500 MG: 500 TABLET ORAL at 01:10

## 2023-10-07 NOTE — ED PROVIDER NOTES
Encounter Date: 10/7/2023       History     Chief Complaint   Patient presents with    Headache     Headache this morning. Bp 196/120 at home. Hx htn. Has not taken meds.      Recent evaluation by Cardiology, details reviewed.  She has not had a significant history of hypertension in the past but did have some hypertensive readings and some mild lower extremity edema, echocardiogram normal, no significant cardiac history.  She was begun on hydrochlorothiazide 12.5 mg once a day, also recently noted to have some mild hyperkalemia on routine labs.  Due for repeat blood pressure check and labs.  Mild headache this morning, frontal, bilateral, waxing and waning, no sudden onset or worrisome symptoms.  She has not yet taken any analgesic and does not yet taken this morning's usual naproxen that she uses for her knee or the hydrochlorothiazide.  No chest pain, dyspnea, edema, or other complaints.  Blood pressure checked quickly at home gave a reading of 196/120.  On arrival here, 184/77 and almost immediately subsequently 179/78.  Nondrinker, nonsmoker.  No other concerns.    The history is provided by the patient and a relative. No  was used.     Review of patient's allergies indicates:   Allergen Reactions    Codeine      Sweating, hot and cold,nervous, jittery     Past Medical History:   Diagnosis Date    Anemia     Arthritis     G6PD deficiency     History of other drug therapy 9/7/2021 1:12:05 PM    [facility] Historical - Unknown: COVID-19 vaccine series completed-Moderna    History of other drug therapy 9/7/2021 1:12:05 PM    [facility] Historical - Unknown: COVID-19 vaccine series completed-Moderna    Kidney stone     Other diseases of respiratory system 9/7/2021 1:12:18 PM    [facility] Historical - Unknown: COVID-19 virus infection-No Additional Notes    Other diseases of respiratory system 9/7/2021 1:12:18 PM    [facility] Historical - Unknown: COVID-19 virus infection-No Additional  Notes     Past Surgical History:   Procedure Laterality Date    COLONOSCOPY N/A 3/7/2019    Procedure: COLONOSCOPY;  Surgeon: Jerry Sawant MD;  Location: [facility] ENDO;  Service: Endoscopy;  Laterality: N/A;    HYSTERECTOMY      benign    KIDNEY STONE SURGERY      LAPAROSCOPIC LYSIS OF ADHESIONS N/A 2/20/2020    Procedure: LYSIS, ADHESIONS, LAPAROSCOPIC;  Surgeon: Dawson Elizalde MD;  Location: [facility] OR;  Service: General;  Laterality: N/A;    ROBOT-ASSISTED CHOLECYSTECTOMY USING DA DAVINA XI N/A 2/20/2020    Procedure: XI ROBOTIC CHOLECYSTECTOMY;  Surgeon: Dawson Elizalde MD;  Location: [facility] OR;  Service: General;  Laterality: N/A;     Family History   Problem Relation Age of Onset    Heart disease Mother     Cataracts Mother     Diabetes Father     Diabetes Brother     Breast cancer Neg Hx     Colon cancer Neg Hx     Ovarian cancer Neg Hx     Thrombophilia Neg Hx      Social History     Tobacco Use    Smoking status: Never    Smokeless tobacco: Never   Substance Use Topics    Alcohol use: Yes     Alcohol/week: 0.0 standard drinks of alcohol     Comment: socially  No alcohol 72h prior to sx     Drug use: No     Review of Systems   Constitutional:  Negative for activity change, fatigue and fever.   HENT:  Negative for congestion, ear pain, facial swelling, nosebleeds, sinus pressure and sore throat.    Eyes:  Negative for pain, discharge, redness and visual disturbance.   Respiratory:  Negative for cough, choking, chest tightness, shortness of breath and wheezing.    Cardiovascular:  Negative for chest pain, palpitations and leg swelling.   Gastrointestinal:  Negative for abdominal distention, abdominal pain, nausea and vomiting.   Endocrine: Negative for heat intolerance, polydipsia and polyuria.   Genitourinary:  Negative for difficulty urinating, dysuria, flank pain, hematuria and urgency.   Musculoskeletal:  Negative for back pain, gait problem, joint swelling and myalgias.   Skin:  Negative for color change and rash.    Allergic/Immunologic: Negative for environmental allergies and food allergies.   Neurological:  Positive for headaches. Negative for dizziness, weakness and numbness.   Hematological:  Negative for adenopathy. Does not bruise/bleed easily.   Psychiatric/Behavioral:  Negative for agitation and behavioral problems. The patient is not nervous/anxious.    All other systems reviewed and are negative.      Physical Exam     Initial Vitals [10/07/23 1330]   BP Pulse Resp Temp SpO2   (!) 184/77 61 18 98.6 °F (37 °C) 100 %      MAP       --         Physical Exam    Nursing note and vitals reviewed.  Constitutional: She appears well-developed and well-nourished. She is not diaphoretic. No distress.   HENT:   Head: Normocephalic and atraumatic.   Mouth/Throat: No oropharyngeal exudate.   Eyes: Conjunctivae and EOM are normal. Pupils are equal, round, and reactive to light. Right eye exhibits no discharge. Left eye exhibits no discharge. No scleral icterus.   Neck: Neck supple. No thyromegaly present. No tracheal deviation present. No JVD present.   Normal range of motion.  Cardiovascular:  Normal rate, regular rhythm, normal heart sounds and intact distal pulses.     Exam reveals no gallop and no friction rub.       No murmur heard.  Pulmonary/Chest: Breath sounds normal. No stridor. No respiratory distress. She has no wheezes. She has no rhonchi. She has no rales. She exhibits no tenderness.   Abdominal: Abdomen is soft. Bowel sounds are normal. She exhibits no distension and no mass. There is no abdominal tenderness. There is no rebound and no guarding.   Musculoskeletal:         General: No tenderness or edema. Normal range of motion.      Cervical back: Normal range of motion and neck supple.     Neurological: She is alert and oriented to person, place, and time. She has normal strength.   Skin: Skin is warm and dry. No rash and no abscess noted. No erythema.   Psychiatric: She has a normal mood and affect. Her behavior  is normal. Judgment and thought content normal.         ED Course   Procedures  Labs Reviewed   BASIC METABOLIC PANEL       2:49 PM Improved, counseled, potassium normalized, blood pressure still running a little high.  Add Norvasc 5 mg once a day and follow up with primary care.  Stable for outpatient management.             Imaging Results    None          Medications   amLODIPine tablet 5 mg (has no administration in time range)   acetaminophen tablet 1,000 mg (1,000 mg Oral Given 10/7/23 1357)   naproxen tablet 500 mg (500 mg Oral Given 10/7/23 1357)   hydroCHLOROthiazide tablet 25 mg (25 mg Oral Given 10/7/23 1357)     Medical Decision Making  Problems Addressed:  Hyperkalemia: chronic illness or injury  Hypertension, unspecified type: chronic illness or injury  Nonintractable headache, unspecified chronicity pattern, unspecified headache type: acute illness or injury    Amount and/or Complexity of Data Reviewed  Labs: ordered. Decision-making details documented in ED Course.    Risk  OTC drugs.  Prescription drug management.  Decision regarding hospitalization.      Additional MDM:   Differential Diagnosis:   Benign headache, uncontrolled hypertension, other etiologies of more serious headache, hyperkalemia                             Clinical Impression:   Final diagnoses:  [R51.9] Nonintractable headache, unspecified chronicity pattern, unspecified headache type (Primary)  [I10] Hypertension, unspecified type  [E87.5] Hyperkalemia        ED Disposition Condition    Discharge Stable          ED Prescriptions       Medication Sig Dispense Start Date End Date Auth. Provider    amLODIPine (NORVASC) 5 MG tablet Take 1 tablet (5 mg total) by mouth once daily. 30 tablet 10/7/2023 -- Anton Wyatt MD          Follow-up Information       Follow up With Specialties Details Why Contact Info    Yajaira Rolle NP Family Medicine Schedule an appointment as soon as possible for a visit   [address] [place] [address]  1  [facility] [address]  [phone]      [facility] Emergency Dept Emergency Medicine  As needed [address]  [facility] [id number] [phone]             Anton Wyatt MD  10/07/23 8359

## 2023-10-07 NOTE — DISCHARGE INSTRUCTIONS
Potassium is back to normal and the hydrochlorothiazide will most likely help keep it there.      I am adding a second blood pressure medicine. Continue the hydrochlorothiazide and see primary care in 1 or 2 weeks for recheck and discuss whether they want you to continue the additional medication.    Add Tylenol to your usual Naproxen as needed/ as labeled for headaches.

## 2023-10-19 ENCOUNTER — LAB VISIT (OUTPATIENT)
Dept: LAB | Facility: HOSPITAL | Age: 68
End: 2023-10-19
Attending: NURSE PRACTITIONER
Payer: MEDICARE

## 2023-10-19 ENCOUNTER — OFFICE VISIT (OUTPATIENT)
Dept: INTERNAL MEDICINE | Facility: CLINIC | Age: 68
End: 2023-10-19
Payer: MEDICARE

## 2023-10-19 VITALS
HEART RATE: 85 BPM | WEIGHT: 194.69 LBS | BODY MASS INDEX: 36.76 KG/M2 | TEMPERATURE: 98 F | RESPIRATION RATE: 16 BRPM | HEIGHT: 61 IN | SYSTOLIC BLOOD PRESSURE: 110 MMHG | OXYGEN SATURATION: 97 % | DIASTOLIC BLOOD PRESSURE: 60 MMHG

## 2023-10-19 DIAGNOSIS — E66.01 SEVERE OBESITY (BMI 35.0-39.9) WITH COMORBIDITY: ICD-10-CM

## 2023-10-19 DIAGNOSIS — R79.89 ELEVATED D-DIMER: ICD-10-CM

## 2023-10-19 DIAGNOSIS — I10 HYPERTENSION, UNSPECIFIED TYPE: ICD-10-CM

## 2023-10-19 DIAGNOSIS — M25.562 ACUTE PAIN OF LEFT KNEE: Primary | ICD-10-CM

## 2023-10-19 DIAGNOSIS — M79.605 LEFT LEG PAIN: ICD-10-CM

## 2023-10-19 DIAGNOSIS — M79.605 LEFT LEG PAIN: Primary | ICD-10-CM

## 2023-10-19 PROBLEM — R03.0 ELEVATED BP WITHOUT DIAGNOSIS OF HYPERTENSION: Status: RESOLVED | Noted: 2019-12-17 | Resolved: 2023-10-19

## 2023-10-19 LAB — D DIMER PPP IA.FEU-MCNC: 1.65 MG/L FEU

## 2023-10-19 PROCEDURE — 99214 OFFICE O/P EST MOD 30 MIN: CPT | Mod: S$PBB,,, | Performed by: NURSE PRACTITIONER

## 2023-10-19 PROCEDURE — 99999PBSHW FLU VACCINE - QUADRIVALENT - ADJUVANTED: Mod: PBBFAC,,,

## 2023-10-19 PROCEDURE — 99214 PR OFFICE/OUTPT VISIT, EST, LEVL IV, 30-39 MIN: ICD-10-PCS | Mod: S$PBB,,, | Performed by: NURSE PRACTITIONER

## 2023-10-19 PROCEDURE — 36415 COLL VENOUS BLD VENIPUNCTURE: CPT | Mod: PO | Performed by: NURSE PRACTITIONER

## 2023-10-19 PROCEDURE — 99999 PR PBB SHADOW E&M-EST. PATIENT-LVL IV: CPT | Mod: PBBFAC,,, | Performed by: NURSE PRACTITIONER

## 2023-10-19 PROCEDURE — 99999PBSHW FLU VACCINE - QUADRIVALENT - ADJUVANTED: ICD-10-PCS | Mod: PBBFAC,,,

## 2023-10-19 PROCEDURE — 85379 FIBRIN DEGRADATION QUANT: CPT | Mod: PO | Performed by: NURSE PRACTITIONER

## 2023-10-19 PROCEDURE — 99999 PR PBB SHADOW E&M-EST. PATIENT-LVL IV: ICD-10-PCS | Mod: PBBFAC,,, | Performed by: NURSE PRACTITIONER

## 2023-10-19 PROCEDURE — 99214 OFFICE O/P EST MOD 30 MIN: CPT | Mod: PBBFAC,PO | Performed by: NURSE PRACTITIONER

## 2023-10-19 PROCEDURE — G0008 ADMIN INFLUENZA VIRUS VAC: HCPCS | Mod: PBBFAC,PO

## 2023-10-19 RX ORDER — DICLOFENAC SODIUM 50 MG/1
50 TABLET, DELAYED RELEASE ORAL 3 TIMES DAILY PRN
Qty: 60 TABLET | Refills: 0 | Status: SHIPPED | OUTPATIENT
Start: 2023-10-19 | End: 2023-12-20 | Stop reason: SDUPTHER

## 2023-10-19 RX ORDER — AMLODIPINE BESYLATE 5 MG/1
5 TABLET ORAL DAILY
Qty: 90 TABLET | Refills: 3 | Status: SHIPPED | OUTPATIENT
Start: 2023-10-19

## 2023-10-19 NOTE — PROGRESS NOTES
Subjective:       Patient ID: Bonnie Vazquez is a 67 y.o. female.    Chief Complaint: Leg Pain    Mrs. Vazquez presents to visit for complaint of L leg pain. Onset approx 2 months. Xray reviewed from 9/19.   No relief with naproxen and tylenol. Denies any previous injury. Originally in L knee, now into calf and up into thigh.     Leg Pain   The incident occurred more than 1 week ago. The incident occurred at home. There was no injury mechanism. The pain is present in the left leg and left knee. The quality of the pain is described as aching. The pain is at a severity of 8/10. The pain has been Worsening since onset. Associated symptoms include an inability to bear weight. Pertinent negatives include no loss of motion, loss of sensation, muscle weakness, numbness or tingling. She reports no foreign bodies present. The symptoms are aggravated by movement, palpation and weight bearing. She has tried acetaminophen and NSAIDs (compression sleeve) for the symptoms.       Patient Active Problem List   Diagnosis    G6PD deficiency    Anemia    Special screening for malignant neoplasms, colon    Colon polyp    Lipoma of colon    Porcelain gallbladder    Acute right-sided low back pain with right-sided sciatica    Severe obesity (BMI 35.0-39.9) with comorbidity    Chronic rhinitis    Heart murmur    Chronic pain of right knee    Hypertension    Left leg pain    Acute pain of left knee       Family History   Problem Relation Age of Onset    Heart disease Mother     Cataracts Mother     Diabetes Father     Diabetes Brother     Breast cancer Neg Hx     Colon cancer Neg Hx     Ovarian cancer Neg Hx     Thrombophilia Neg Hx      Past Surgical History:   Procedure Laterality Date    COLONOSCOPY N/A 3/7/2019    Procedure: COLONOSCOPY;  Surgeon: Jerry Sawant MD;  Location: [facility];  Service: Endoscopy;  Laterality: N/A;    HYSTERECTOMY      benign    KIDNEY STONE SURGERY      LAPAROSCOPIC LYSIS OF ADHESIONS N/A 2/20/2020     Procedure: LYSIS, ADHESIONS, LAPAROSCOPIC;  Surgeon: Dawson Elizalde MD;  Location: [facility] OR;  Service: General;  Laterality: N/A;    ROBOT-ASSISTED CHOLECYSTECTOMY USING DA DAVINA XI N/A 2/20/2020    Procedure: XI ROBOTIC CHOLECYSTECTOMY;  Surgeon: Dawson Elizalde MD;  Location: [facility] OR;  Service: General;  Laterality: N/A;         Current Outpatient Medications:     ascorbic acid, vitamin C, (VITAMIN C) 100 MG tablet, Take 100 mg by mouth daily as needed., Disp: , Rfl:     biotin 1 mg tablet, Take 1,000 mcg by mouth 3 (three) times daily., Disp: , Rfl:     calcium-vitamin D3 (OS-JEREMI 500 + D3) 500 mg(1,250mg) -200 unit per tablet, Take 1 tablet by mouth 2 (two) times daily with meals., Disp: , Rfl:     diclofenac sodium (VOLTAREN) 1 % Gel, Apply 2 g topically 2 (two) times daily as needed., Disp: 100 g, Rfl: 0    ELDERBERRY FRUIT ORAL, Take by mouth., Disp: , Rfl:     ferrous sulfate (IRON ORAL), Take 1 tablet by mouth once daily., Disp: , Rfl:     hydroCHLOROthiazide (HYDRODIURIL) 12.5 MG Tab, Take 1 tablet (12.5 mg total) by mouth once daily., Disp: 30 tablet, Rfl: 11    multivitamin capsule, Take 1 capsule by mouth once daily., Disp: , Rfl:     amLODIPine (NORVASC) 5 MG tablet, Take 1 tablet (5 mg total) by mouth once daily., Disp: 90 tablet, Rfl: 3    diclofenac (VOLTAREN) 50 MG EC tablet, Take 1 tablet (50 mg total) by mouth 3 (three) times daily as needed (pain)., Disp: 60 tablet, Rfl: 0    Review of Systems   Constitutional:  Negative for appetite change, chills, fatigue and fever.   Respiratory:  Negative for cough and shortness of breath.    Cardiovascular:  Negative for chest pain and palpitations.   Gastrointestinal:  Negative for abdominal pain, diarrhea, nausea and vomiting.   Musculoskeletal:  Positive for arthralgias, gait problem and myalgias. Negative for joint swelling.   Neurological:  Negative for dizziness, tingling, light-headedness, numbness and headaches.       Objective:   /60 (BP Location:  "Left arm, Patient Position: Sitting, BP Method: X-Large (Manual))   Pulse 85   Temp 98.1 °F (36.7 °C) (Temporal)   Resp 16   Ht 5' 1" (1.549 m)   Wt 88.3 kg (194 lb 10.7 oz)   SpO2 97%   BMI 36.78 kg/m²      Physical Exam  Constitutional:       General: She is not in acute distress.     Appearance: Normal appearance. She is obese. She is not ill-appearing.   Cardiovascular:      Rate and Rhythm: Normal rate and regular rhythm.      Pulses: Normal pulses.      Heart sounds: Murmur heard.      No friction rub. No gallop.   Pulmonary:      Effort: Pulmonary effort is normal. No respiratory distress.      Breath sounds: Normal breath sounds. No wheezing.   Musculoskeletal:      Left knee: No swelling, effusion or erythema. Decreased range of motion. Tenderness (posterior) present.      Right lower leg: No edema.      Left lower leg: Tenderness present. No bony tenderness. No edema.   Skin:     General: Skin is warm and dry.      Coloration: Skin is not pale.      Findings: No erythema.   Neurological:      Mental Status: She is alert and oriented to person, place, and time.   Psychiatric:         Mood and Affect: Mood normal.         Assessment & Plan     Problem List Items Addressed This Visit          Cardiac/Vascular    Hypertension    Current Assessment & Plan     Blood pressure stable. Continue current medications.          Relevant Medications    amLODIPine (NORVASC) 5 MG tablet       Endocrine    Severe obesity (BMI 35.0-39.9) with comorbidity    Current Assessment & Plan     Counseled on importance of diet and exercise in order to improve overall quality of life, and reduce risk of future comorbidities.              Orthopedic    Left leg pain    Current Assessment & Plan     dvt r/o with calf tenderness.          Relevant Medications    diclofenac (VOLTAREN) 50 MG EC tablet    Other Relevant Orders    Ambulatory referral/consult to Orthopedics    D-DIMER, QUANTITATIVE    Acute pain of left knee - Primary " "   Current Assessment & Plan     Xray previously showed minimal arthritic changes.   Referral to ortho for now. Possible PT.   D dimer since pain is now into calf.          Relevant Medications    diclofenac (VOLTAREN) 50 MG EC tablet    Other Relevant Orders    Ambulatory referral/consult to Orthopedics     Follow up if symptoms worsen or fail to improve.            Portions of this note may have been created with voice recognition software. Occasional "wrong-word" or "sound-a-like" substitutions may have occurred due to the inherent limitations of voice recognition software. Please, read the note carefully and recognize, using context, where substitutions have occurred.       "

## 2023-10-19 NOTE — ASSESSMENT & PLAN NOTE
Xray previously showed minimal arthritic changes.   Referral to ortho for now. Possible PT.   D dimer since pain is now into calf.

## 2023-10-24 ENCOUNTER — HOSPITAL ENCOUNTER (OUTPATIENT)
Dept: RADIOLOGY | Facility: HOSPITAL | Age: 68
Discharge: HOME | End: 2023-10-24
Attending: NURSE PRACTITIONER
Payer: MEDICARE

## 2023-10-24 ENCOUNTER — OFFICE VISIT (OUTPATIENT)
Dept: ORTHOPEDICS | Facility: CLINIC | Age: 68
End: 2023-10-24
Payer: MEDICARE

## 2023-10-24 ENCOUNTER — TELEPHONE (OUTPATIENT)
Dept: ORTHOPEDICS | Facility: CLINIC | Age: 68
End: 2023-10-24

## 2023-10-24 DIAGNOSIS — R79.89 ELEVATED D-DIMER: ICD-10-CM

## 2023-10-24 DIAGNOSIS — M25.562 ACUTE PAIN OF LEFT KNEE: ICD-10-CM

## 2023-10-24 DIAGNOSIS — M17.12 PRIMARY OSTEOARTHRITIS OF LEFT KNEE: ICD-10-CM

## 2023-10-24 DIAGNOSIS — M79.605 LEFT LEG PAIN: ICD-10-CM

## 2023-10-24 DIAGNOSIS — M25.562 ACUTE PAIN OF LEFT KNEE: Primary | ICD-10-CM

## 2023-10-24 DIAGNOSIS — S83.242A TEAR OF MEDIAL MENISCUS OF LEFT KNEE, CURRENT, UNSPECIFIED TEAR TYPE, INITIAL ENCOUNTER: Primary | ICD-10-CM

## 2023-10-24 PROCEDURE — 93971 EXTREMITY STUDY: CPT | Mod: 26,LT,, | Performed by: RADIOLOGY

## 2023-10-24 PROCEDURE — 99204 OFFICE O/P NEW MOD 45 MIN: CPT | Mod: S$PBB,,, | Performed by: STUDENT IN AN ORGANIZED HEALTH CARE EDUCATION/TRAINING PROGRAM

## 2023-10-24 PROCEDURE — 99999 PR PBB SHADOW E&M-EST. PATIENT-LVL III: CPT | Mod: PBBFAC,,, | Performed by: STUDENT IN AN ORGANIZED HEALTH CARE EDUCATION/TRAINING PROGRAM

## 2023-10-24 PROCEDURE — 93971 US LOWER EXTREMITY VEINS LEFT: ICD-10-PCS | Mod: 26,LT,, | Performed by: RADIOLOGY

## 2023-10-24 PROCEDURE — 99999 PR PBB SHADOW E&M-EST. PATIENT-LVL III: ICD-10-PCS | Mod: PBBFAC,,, | Performed by: STUDENT IN AN ORGANIZED HEALTH CARE EDUCATION/TRAINING PROGRAM

## 2023-10-24 PROCEDURE — 99213 OFFICE O/P EST LOW 20 MIN: CPT | Mod: PBBFAC,25,PO | Performed by: STUDENT IN AN ORGANIZED HEALTH CARE EDUCATION/TRAINING PROGRAM

## 2023-10-24 PROCEDURE — 99204 PR OFFICE/OUTPT VISIT, NEW, LEVL IV, 45-59 MIN: ICD-10-PCS | Mod: S$PBB,,, | Performed by: STUDENT IN AN ORGANIZED HEALTH CARE EDUCATION/TRAINING PROGRAM

## 2023-10-24 PROCEDURE — 93971 EXTREMITY STUDY: CPT | Mod: TC,PO,LT

## 2023-10-24 NOTE — PROGRESS NOTES
Patient ID: Bonnie Vazquez  YOB: 1955  MRN: [id number]    Chief Complaint: Pain of the Left Knee      Referred By: Yajaira Panchal NP    History of Present Illness: Bonnie Vazquez is a right-hand dominant 68 y.o. female who presents today with left knee.  The incident occurred more than 1 week ago. The incident occurred at home. There was no injury mechanism. The pain is present in the left leg and left knee. The quality of the pain is described as aching. The pain is at a severity of 8/10. The pain has been Worsening since onset. Associated symptoms include an inability to bear weight. Pertinent negatives include no loss of motion, loss of sensation, muscle weakness, numbness or tingling. She reports no foreign bodies present. The symptoms are aggravated by movement, palpation and weight bearing. She has tried acetaminophen and NSAIDs    Occupation: retired      Past Medical History:   Past Medical History:   Diagnosis Date    Anemia     Arthritis     G6PD deficiency     History of other drug therapy 9/7/2021 1:12:05 PM    [facility] [place] Historical - Unknown: COVID-19 vaccine series completed-Moderna    History of other drug therapy 9/7/2021 1:12:05 PM    [facility] [place] Historical - Unknown: COVID-19 vaccine series completed-Moderna    Kidney stone     Other diseases of respiratory system 9/7/2021 1:12:18 PM    [facility] [place] Historical - Unknown: COVID-19 virus infection-No Additional Notes    Other diseases of respiratory system 9/7/2021 1:12:18 PM    [facility] Historical - Unknown: COVID-19 virus infection-No Additional Notes     Past Surgical History:   Procedure Laterality Date    COLONOSCOPY N/A 3/7/2019    Procedure: COLONOSCOPY;  Surgeon: Jerry Sawant MD;  Location: [facility];  Service: Endoscopy;  Laterality: N/A;    HYSTERECTOMY      benign    KIDNEY STONE SURGERY      LAPAROSCOPIC LYSIS OF ADHESIONS N/A 2/20/2020    Procedure: LYSIS, ADHESIONS, LAPAROSCOPIC;  Surgeon:  Dawson Elizalde MD;  Location: [facility] OR;  Service: General;  Laterality: N/A;    ROBOT-ASSISTED CHOLECYSTECTOMY USING DA DAVINA XI N/A 2/20/2020    Procedure: XI ROBOTIC CHOLECYSTECTOMY;  Surgeon: Dawson Elizalde MD;  Location: [facility] OR;  Service: General;  Laterality: N/A;     Family History   Problem Relation Age of Onset    Heart disease Mother     Cataracts Mother     Diabetes Father     Diabetes Brother     Breast cancer Neg Hx     Colon cancer Neg Hx     Ovarian cancer Neg Hx     Thrombophilia Neg Hx      Social History     Socioeconomic History    Marital status:     Number of children: 3   Occupational History    Occupation: Clerical     Employer: [employer]   Tobacco Use    Smoking status: Never    Smokeless tobacco: Never   Substance and Sexual Activity    Alcohol use: Yes     Alcohol/week: 0.0 standard drinks of alcohol     Comment: socially  No alcohol 72h prior to sx     Drug use: No    Sexual activity: Yes     Partners: Male     Birth control/protection: Surgical     Comment: mut monog     Medication List with Changes/Refills   Current Medications    AMLODIPINE (NORVASC) 5 MG TABLET    Take 1 tablet (5 mg total) by mouth once daily.    ASCORBIC ACID, VITAMIN C, (VITAMIN C) 100 MG TABLET    Take 100 mg by mouth daily as needed.    BIOTIN 1 MG TABLET    Take 1,000 mcg by mouth 3 (three) times daily.    CALCIUM-VITAMIN D3 (OS-JEREMI 500 + D3) 500 MG(1,250MG) -200 UNIT PER TABLET    Take 1 tablet by mouth 2 (two) times daily with meals.    DICLOFENAC (VOLTAREN) 50 MG EC TABLET    Take 1 tablet (50 mg total) by mouth 3 (three) times daily as needed (pain).    DICLOFENAC SODIUM (VOLTAREN) 1 % GEL    Apply 2 g topically 2 (two) times daily as needed.    ELDERBERRY FRUIT ORAL    Take by mouth.    FERROUS SULFATE (IRON ORAL)    Take 1 tablet by mouth once daily.    HYDROCHLOROTHIAZIDE (HYDRODIURIL) 12.5 MG TAB    Take 1 tablet (12.5 mg total) by mouth once daily.    MULTIVITAMIN CAPSULE    Take 1 capsule by  mouth once daily.     Review of patient's allergies indicates:   Allergen Reactions    Codeine      Sweating, hot and cold,nervous, jittery       Physical Exam:   There is no height or weight on file to calculate BMI.    GENERAL: Well appearing, in no acute distress.  HEAD: Normocephalic and atraumatic.  ENT: External ears and nose grossly normal.  EYES: EOMI bilaterally  PULMONARY: Respirations are grossly even and non-labored.  NEURO: Awake, alert, and oriented x 3.  SKIN: No obvious rashes appreciated.  PSYCH: Mood & affect are appropriate.    Detailed MSK exam:     Left knee exam:   -ROM: extension 0, flexion 110  -TTP: Medial joint line  -effusion: present  -Patellar apprehension negative  -Leonarda test positive -  medial  -stable to varus and valgus stress tests  -Lachman test negative, anterior drawer test negative, posterior drawer test negative    Right knee exam:   -ROM: extension 0, flexion 120  -TTP: None  -effusion: none  -Patellar apprehension negative  -Leonarda test negative  -stable to varus and valgus stress tests  -Lachman test negative, anterior drawer test negative, posterior drawer test negative      Imaging:  US Lower Extremity Veins Left  Narrative: EXAMINATION:  US LOWER EXTREMITY VEINS LEFT    CLINICAL HISTORY:  Pain in left leg    TECHNIQUE:  Duplex and color flow Doppler evaluation and graded compression of the left lower extremity veins was performed.    COMPARISON:  None    FINDINGS:  Left thigh veins: The common femoral, femoral, popliteal, upper greater saphenous, and deep femoral veins are patent and free of thrombus. The veins are normally compressible and have normal phasic flow and augmentation response.    Left calf veins: The visualized calf veins are patent.    Contralateral CFV: The contralateral (right) common femoral vein is patent and free of thrombus.    Miscellaneous: None  Impression: No evidence of deep venous thrombosis in the left lower extremity.    Electronically  signed by: JANET Mcarthur MD  Date:    10/24/2023  Time:    09:29        Relevant imaging results were reviewed and interpreted by me and per my read shows minimal medial compartment joint space narrowing left knee radiographs.  This was discussed with the patient and / or family today.     Assessment:  Bonnie Vazquez is a 68 y.o. female presenting with chronic left knee pain.   History, physical and radiographs are consistent with a likely diagnosis of minimal OA, possible MMT.   Plan: PT referral. Ice, diclofenac tablets, tylenol as needed. Consider steroid injection if not improving. Continue conservative management for pain.   Follow up 6 weeks. All questions answered.      Tear of medial meniscus of left knee, current, unspecified tear type, initial encounter    Primary osteoarthritis of left knee    Acute pain of left knee  -     Ambulatory referral/consult to Orthopedics    Left leg pain  -     Ambulatory referral/consult to Orthopedics           A copy of today's visit note has been sent to the referring provider.     Electronically signed:  Rishi Zhao MD, MPH  10/24/2023  9:13 AM

## 2023-10-24 NOTE — PATIENT INSTRUCTIONS
Assessment:  Bonnie Vazquez is a 68 y.o. female No chief complaint on file.      Encounter Diagnoses   Name Primary?    Acute pain of left knee     Left leg pain         Plan:  Referral sent to [facility] [place]  Continue Diclofenac tablets  Please take Tylenol 1 Gram (2 extra-strength Tylenol tablets) up to 3 times a day.  Please to not take any extra doses of tylenol if you are scheduling in this manner.   Patient can use ice every 2 hours for 15 minutes as needed    Follow-up: 6 weeks or sooner if there are any problems between now and then.    Thank you for choosing [facility] and Dr. Rishi Zhao for your orthopedic & sports medicine care. It is our goal to provide you with exceptional care that will help keep you healthy, active, and get you back in the game.    Please do not hesitate to reach out to us via email, phone, or MyChart with any questions, concerns, or feedback.    If you felt that you received exemplary care today, please consider leaving us feedback on [employer] at:  [email or web address]    If you are experiencing pain/discomfort ,or have questions after 5pm and would like to be connected to the [facility] on-call team, please call this number and specify which Sports Medicine provider is treating you: [phone]

## 2023-11-21 ENCOUNTER — TELEPHONE (OUTPATIENT)
Dept: ORTHOPEDICS | Facility: CLINIC | Age: 68
End: 2023-11-21
Payer: MEDICARE

## 2023-11-30 NOTE — LETTER
January 14, 2020      Nando Hernandez,   [address]  [place] [address]           [facility]  [address]  [place] [address]  Phone: [phone]          Patient: Bonnie Vazquez   MR Number: [id number]   YOB: 1955   Date of Visit: 1/14/2020       Dear Dr. Nando Hernandez:    Thank you for referring Bonnie Vazquez to me for evaluation. Attached you will find relevant portions of my assessment and plan of care.    If you have questions, please do not hesitate to call me. I look forward to following Bonnie Vazquez along with you.    Sincerely,    Dawson Elizalde MD    Enclosure  CC:  No Recipients    If you would like to receive this communication electronically, please contact [email or web address] or [phone] to request more information on [employer] Link access.    For providers and/or their staff who would like to refer a patient to [facility], please contact us through our one-stop-shop provider referral line, Gayla Barnard, at [phone].    If you feel you have received this communication in error or would no longer like to receive these types of communications, please e-mail [email or web address]         
No

## 2023-12-20 DIAGNOSIS — M25.562 ACUTE PAIN OF LEFT KNEE: ICD-10-CM

## 2023-12-20 DIAGNOSIS — M79.605 LEFT LEG PAIN: ICD-10-CM

## 2023-12-20 RX ORDER — DICLOFENAC SODIUM 50 MG/1
50 TABLET, DELAYED RELEASE ORAL 3 TIMES DAILY PRN
Qty: 60 TABLET | Refills: 0 | Status: SHIPPED | OUTPATIENT
Start: 2023-12-20

## 2023-12-20 NOTE — TELEPHONE ENCOUNTER
----- Message from Lovely Padilla sent at 12/20/2023  2:25 PM CST -----  .Type:  RX Refill Request    Who Called: .Bonnie Vazquez   Refill or New Rx:refill  RX Name and Strength iclofenac (VOLTAREN) 50 MG EC table and diclofenac sodium (VOLTAREN) 1 % Gel  How is the patient currently taking it? (ex. 1XDay):daily  Is this a 30 day or 90 day RX:    Preferred Pharmacy with phone number:.  [facility] - [place] - [address]  [address] [address] [address]  Phone: [phone] Fax: [phone]     Local or Mail Order:local  Ordering Provider: [facility]  Would the patient rather a call back or a response via [facility]? Call back  Best Call Back Number:.[phone]   Additional Information:

## 2024-03-25 ENCOUNTER — OFFICE VISIT (OUTPATIENT)
Dept: CARDIOLOGY | Facility: CLINIC | Age: 69
End: 2024-03-25
Payer: MEDICARE

## 2024-03-25 VITALS
BODY MASS INDEX: 37.37 KG/M2 | OXYGEN SATURATION: 100 % | WEIGHT: 197.75 LBS | HEART RATE: 70 BPM | SYSTOLIC BLOOD PRESSURE: 142 MMHG | DIASTOLIC BLOOD PRESSURE: 70 MMHG

## 2024-03-25 DIAGNOSIS — E66.01 SEVERE OBESITY (BMI 35.0-39.9) WITH COMORBIDITY: ICD-10-CM

## 2024-03-25 DIAGNOSIS — I34.0 MILD MITRAL REGURGITATION: Primary | ICD-10-CM

## 2024-03-25 DIAGNOSIS — M79.89 SWELLING OF LOWER EXTREMITY: ICD-10-CM

## 2024-03-25 DIAGNOSIS — I10 PRIMARY HYPERTENSION: ICD-10-CM

## 2024-03-25 DIAGNOSIS — R01.1 HEART MURMUR: ICD-10-CM

## 2024-03-25 PROCEDURE — 99214 OFFICE O/P EST MOD 30 MIN: CPT | Mod: S$PBB,,, | Performed by: INTERNAL MEDICINE

## 2024-03-25 PROCEDURE — 99999 PR PBB SHADOW E&M-EST. PATIENT-LVL III: CPT | Mod: PBBFAC,,, | Performed by: INTERNAL MEDICINE

## 2024-03-25 PROCEDURE — 99213 OFFICE O/P EST LOW 20 MIN: CPT | Mod: PBBFAC | Performed by: INTERNAL MEDICINE

## 2024-03-25 NOTE — PROGRESS NOTES
Subjective:   Patient ID:  Bonnie Vazquez is a 68 y.o. female who presents for evaluation of No chief complaint on file.      HPI  3.25.2024  For six-month follow-up   Had echocardiogram after last visit mild mitral regurgitation on my review  Started on HCTZ last visit.  Later she had to go to the emergency room.  She was started on amlodipine.  However she states that she has not taking that.    Her blood pressure is elevated today.    She states that she improved in her salt restriction however she still.           9.2023  67-year-old female with history below.    Never smoker.    Comes in for evaluation of a cardiac murmur heard on exam by PCP.    She does have a systolic 1 to 2/6 murmur.    She denies any chest pain, orthopnea or PND.    She does have history of elevated blood pressure but no history of hypertension.    She has 2+ pitting edema in her lower extremities.    She states that she eats regular.  No specific low salt restriction.      Past Medical History:   Diagnosis Date    Anemia     Arthritis     G6PD deficiency     History of other drug therapy 9/7/2021 1:12:05 PM    [facility] Historical - Unknown: COVID-19 vaccine series completed-Moderna    History of other drug therapy 9/7/2021 1:12:05 PM    [facility] [place] Historical - Unknown: COVID-19 vaccine series completed-Moderna    Kidney stone     Other diseases of respiratory system 9/7/2021 1:12:18 PM    [facility] Historical - Unknown: COVID-19 virus infection-No Additional Notes    Other diseases of respiratory system 9/7/2021 1:12:18 PM    [facility] [place] Historical - Unknown: COVID-19 virus infection-No Additional Notes       Past Surgical History:   Procedure Laterality Date    COLONOSCOPY N/A 3/7/2019    Procedure: COLONOSCOPY;  Surgeon: Jerry Sawant MD;  Location: [facility];  Service: Endoscopy;  Laterality: N/A;    HYSTERECTOMY      benign    KIDNEY STONE SURGERY      LAPAROSCOPIC LYSIS OF ADHESIONS N/A 2/20/2020    Procedure: LYSIS,  ADHESIONS, LAPAROSCOPIC;  Surgeon: Dawson Elizalde MD;  Location: [facility] OR;  Service: General;  Laterality: N/A;    ROBOT-ASSISTED CHOLECYSTECTOMY USING DA DAVINA XI N/A 2/20/2020    Procedure: XI ROBOTIC CHOLECYSTECTOMY;  Surgeon: Dawson Elizalde MD;  Location: [facility] OR;  Service: General;  Laterality: N/A;       Social History     Tobacco Use    Smoking status: Never    Smokeless tobacco: Never   Substance Use Topics    Alcohol use: Yes     Alcohol/week: 0.0 standard drinks of alcohol     Comment: socially  No alcohol 72h prior to sx     Drug use: No       Family History   Problem Relation Age of Onset    Heart disease Mother     Cataracts Mother     Diabetes Father     Diabetes Brother     Breast cancer Neg Hx     Colon cancer Neg Hx     Ovarian cancer Neg Hx     Thrombophilia Neg Hx        Review of Systems   Cardiovascular:  Negative for chest pain, dyspnea on exertion, palpitations and syncope.   Genitourinary: Negative.    Neurological: Negative.        Current Outpatient Medications on File Prior to Visit   Medication Sig    amLODIPine (NORVASC) 5 MG tablet Take 1 tablet (5 mg total) by mouth once daily.    ascorbic acid, vitamin C, (VITAMIN C) 100 MG tablet Take 100 mg by mouth daily as needed.    hydroCHLOROthiazide (HYDRODIURIL) 12.5 MG Tab Take 1 tablet (12.5 mg total) by mouth once daily.    multivitamin capsule Take 1 capsule by mouth once daily.    biotin 1 mg tablet Take 1,000 mcg by mouth 3 (three) times daily.    calcium-vitamin D3 (OS-JEREMI 500 + D3) 500 mg(1,250mg) -200 unit per tablet Take 1 tablet by mouth 2 (two) times daily with meals.    diclofenac (VOLTAREN) 50 MG EC tablet Take 1 tablet (50 mg total) by mouth 3 (three) times daily as needed (pain). (Patient not taking: Reported on 3/25/2024)    diclofenac sodium (VOLTAREN) 1 % Gel Apply 2 g topically 2 (two) times daily as needed. (Patient not taking: Reported on 3/25/2024)    ELDERBERRY FRUIT ORAL Take by mouth.    ferrous sulfate (IRON ORAL) Take 1  tablet by mouth once daily.     No current facility-administered medications on file prior to visit.       Objective:   Objective:  Wt Readings from Last 3 Encounters:   03/25/24 89.7 kg (197 lb 12 oz)   11/20/23 87.1 kg (192 lb)   10/19/23 88.3 kg (194 lb 10.7 oz)     Temp Readings from Last 3 Encounters:   10/19/23 98.1 °F (36.7 °C) (Temporal)   10/07/23 98.6 °F (37 °C) (Oral)   09/21/23 98.1 °F (36.7 °C) (Temporal)     BP Readings from Last 3 Encounters:   03/25/24 (!) 142/70   11/20/23 138/70   10/19/23 110/60     Pulse Readings from Last 3 Encounters:   03/25/24 70   10/19/23 85   10/07/23 (!) 57       Physical Exam  Vitals reviewed.   Constitutional:       Appearance: She is well-developed.   Neck:      Vascular: No carotid bruit.   Cardiovascular:      Rate and Rhythm: Normal rate and regular rhythm.      Pulses: Intact distal pulses.      Heart sounds: Normal heart sounds. No murmur heard.  Pulmonary:      Breath sounds: Normal breath sounds.   Musculoskeletal:         General: Swelling present.   Neurological:      Mental Status: She is oriented to person, place, and time.         Lab Results   Component Value Date    CHOL 163 09/21/2023    CHOL 167 01/25/2022    CHOL 173 09/19/2019     Lab Results   Component Value Date    HDL 41 09/21/2023    HDL 46 01/25/2022    HDL 40 09/19/2019     Lab Results   Component Value Date    LDLCALC 109.6 09/21/2023    LDLCALC 105.8 01/25/2022    LDLCALC 122.0 09/19/2019     Lab Results   Component Value Date    TRIG 62 09/21/2023    TRIG 76 01/25/2022    TRIG 55 09/19/2019     Lab Results   Component Value Date    CHOLHDL 25.2 09/21/2023    CHOLHDL 27.5 01/25/2022    CHOLHDL 23.1 09/19/2019       Chemistry        Component Value Date/Time     10/07/2023 1356    K 4.1 10/07/2023 1356     10/07/2023 1356    CO2 25 10/07/2023 1356    BUN 15 10/07/2023 1356    CREATININE 1.0 10/07/2023 1356    GLU 88 10/07/2023 1356        Component Value Date/Time    CALCIUM 9.7  10/07/2023 1356    ALKPHOS 80 09/21/2023 0948    AST 16 09/21/2023 0948    ALT 19 09/21/2023 0948    BILITOT 0.5 09/21/2023 0948    ESTGFRAFRICA >60.0 01/25/2022 1701    EGFRNONAA 53.2 (A) 01/25/2022 1701          Lab Results   Component Value Date    TSH 1.597 09/21/2023     Lab Results   Component Value Date    INR 1.0 04/19/2010     Lab Results   Component Value Date    WBC 6.49 09/21/2023    HGB 11.1 (L) 09/21/2023    HCT 34.4 (L) 09/21/2023    MCV 85 09/21/2023     09/21/2023     BNP  @LABRCNTIP(BNP,BNPTRIAGEBLO)@  CrCl cannot be calculated (Patient's most recent lab result is older than the maximum 7 days allowed.).     Imaging:  ======    No results found for this or any previous visit.    No results found for this or any previous visit.    Results for orders placed during the hospital encounter of 01/14/20    X-Ray Chest PA And Lateral    Narrative  EXAMINATION:  XR CHEST PA AND LATERAL    CLINICAL HISTORY:  Other specified diseases of gallbladder    TECHNIQUE:  PA and lateral views of the chest were performed.    COMPARISON:  Chest x-ray from 04/19/2010    FINDINGS:  Clear lungs.  No effusion.  Cardiomediastinal silhouette and osseous structures are stable in appearance with degenerative changes of the spine noted.    Impression  Stable chest.  No active disease.      Electronically signed by: Alexi Carr MD  Date:    01/14/2020  Time:    11:10    No results found for this or any previous visit.    No valid procedures specified.    No results found for this or any previous visit.      No results found for this or any previous visit.      No results found for this or any previous visit.      Diagnostic Results:  ECG: Reviewed    The 10-year ASCVD risk score (Monica MEDRANO, et al., 2019) is: 10.6%    Values used to calculate the score:      Age: 68 years      Sex: Female      Is Non- : Yes      Diabetic: No      Tobacco smoker: No      Systolic Blood Pressure: 142 mmHg      Is BP  treated: Yes      HDL Cholesterol: 41 mg/dL      Total Cholesterol: 163 mg/dL  Sinus bradycardia   Nonspecific T wave abnormality   Abnormal ECG   When compared with ECG of 14-JAN-2020 12:59,   No significant change was found     Referred By: ISSAC MILLIGAN          Results for orders placed during the hospital encounter of 09/27/23    Echo    Interpretation Summary    Left Ventricle: The left ventricle is normal in size. Normal wall thickness. Normal wall motion. There is normal systolic function with a visually estimated ejection fraction of 60 - 65%. There is normal diastolic function.    Right Ventricle: Normal right ventricular cavity size. Wall thickness is normal. Right ventricle wall motion  is normal. Systolic function is normal.    Pulmonary Artery: The estimated pulmonary artery systolic pressure is 22 mmHg.    IVC/SVC: Normal venous pressure at 3 mmHg.      Assessment and Plan:   Mild mitral regurgitation    Severe obesity (BMI 35.0-39.9) with comorbidity    Heart murmur    Swelling of lower extremity    Primary hypertension      Continue with HCTZ.  Recommended to resume her amlodipine.  Decrease salt intake.  Reviewed all tests and above medical conditions with patient in detail and formulated treatment plan.  Risk factor modification discussed.   Cardiac low salt diet discussed.  Maintaining healthy weight and weight loss goals were discussed in clinic.    Follow up in  6 months

## 2024-08-23 DIAGNOSIS — I10 PRIMARY HYPERTENSION: ICD-10-CM

## 2024-08-23 RX ORDER — HYDROCHLOROTHIAZIDE 12.5 MG/1
12.5 TABLET ORAL
Qty: 30 TABLET | Refills: 11 | Status: SHIPPED | OUTPATIENT
Start: 2024-08-23

## 2024-09-03 ENCOUNTER — TELEPHONE (OUTPATIENT)
Dept: CARDIOLOGY | Facility: CLINIC | Age: 69
End: 2024-09-03
Payer: MEDICARE

## 2024-09-03 NOTE — TELEPHONE ENCOUNTER
LVM for pt to call back in regards to rescheduling.         ----- Message from Yue Kennedy sent at 9/3/2024  3:15 PM CDT -----  Regarding: Returning a Missed Call  Contact: [phone]    Returning a Missed Call     Caller: pt         Returning call to: Ramila      Caller can be reached @:  [phone]     Nature of the call: rescheduling

## 2024-09-03 NOTE — TELEPHONE ENCOUNTER
LVM for pt to call back in regards to rescheduling appt.                          ----- Message from Siomara Dong sent at 9/3/2024  1:55 PM CDT -----  Regarding: Needing a morning appt  Contact: [phone]  Type:  Needs Medical Advice    Who Called: Bonnie   She called requesting a later appointment the time frame is not good   Would like to keep the same month if possible will take a earlier date in that same month      Would the patient rather a call back or a response via [facility]? Both   Best Call Back Number: [phone]    Additional Information:

## 2024-09-05 ENCOUNTER — TELEPHONE (OUTPATIENT)
Dept: CARDIOLOGY | Facility: CLINIC | Age: 69
End: 2024-09-05
Payer: MEDICARE

## 2024-09-05 NOTE — TELEPHONE ENCOUNTER
I returned pts call to reschedule appt.   Appt rescheduled.           ----- Message from Renetta Varela sent at 9/5/2024  1:34 PM CDT -----  Contact: [phone]  Type:  Patient Returning Call    Who Called:ALLA CHAUDHRY [[id number]]  Who Left Message for Patient: RHONA ABAD  Does the patient know what this is regarding?:missed a call from your office  Would the patient rather a call back or a response via [facility]? Call back  Best Call Back Number: [phone]  Additional Information: [id number]

## 2024-10-07 ENCOUNTER — LAB VISIT (OUTPATIENT)
Dept: LAB | Facility: HOSPITAL | Age: 69
End: 2024-10-07
Attending: NURSE PRACTITIONER
Payer: MEDICARE

## 2024-10-07 ENCOUNTER — OFFICE VISIT (OUTPATIENT)
Dept: INTERNAL MEDICINE | Facility: CLINIC | Age: 69
End: 2024-10-07
Payer: MEDICARE

## 2024-10-07 ENCOUNTER — TELEPHONE (OUTPATIENT)
Dept: INTERNAL MEDICINE | Facility: CLINIC | Age: 69
End: 2024-10-07

## 2024-10-07 VITALS
SYSTOLIC BLOOD PRESSURE: 130 MMHG | HEIGHT: 61 IN | HEART RATE: 93 BPM | BODY MASS INDEX: 36.96 KG/M2 | OXYGEN SATURATION: 96 % | WEIGHT: 195.75 LBS | DIASTOLIC BLOOD PRESSURE: 66 MMHG | TEMPERATURE: 96 F

## 2024-10-07 DIAGNOSIS — Z12.11 COLON CANCER SCREENING: ICD-10-CM

## 2024-10-07 DIAGNOSIS — D17.5 LIPOMA OF COLON: ICD-10-CM

## 2024-10-07 DIAGNOSIS — E66.01 SEVERE OBESITY (BMI 35.0-39.9) WITH COMORBIDITY: ICD-10-CM

## 2024-10-07 DIAGNOSIS — I10 HYPERTENSION, UNSPECIFIED TYPE: Primary | ICD-10-CM

## 2024-10-07 DIAGNOSIS — B37.9 CANDIDA INFECTION: ICD-10-CM

## 2024-10-07 DIAGNOSIS — I10 HYPERTENSION, UNSPECIFIED TYPE: ICD-10-CM

## 2024-10-07 DIAGNOSIS — D55.0: ICD-10-CM

## 2024-10-07 DIAGNOSIS — G89.29 CHRONIC PAIN OF RIGHT KNEE: ICD-10-CM

## 2024-10-07 DIAGNOSIS — K63.5 POLYP OF COLON, UNSPECIFIED PART OF COLON, UNSPECIFIED TYPE: ICD-10-CM

## 2024-10-07 DIAGNOSIS — M79.605 LEFT LEG PAIN: ICD-10-CM

## 2024-10-07 DIAGNOSIS — M25.562 ACUTE PAIN OF LEFT KNEE: ICD-10-CM

## 2024-10-07 DIAGNOSIS — N89.8 VAGINAL DISCHARGE: ICD-10-CM

## 2024-10-07 DIAGNOSIS — R05.9 COUGH, UNSPECIFIED TYPE: Primary | ICD-10-CM

## 2024-10-07 DIAGNOSIS — M25.561 CHRONIC PAIN OF RIGHT KNEE: ICD-10-CM

## 2024-10-07 LAB
ALBUMIN SERPL BCP-MCNC: 3.7 G/DL (ref 3.5–5.2)
ALP SERPL-CCNC: 96 U/L (ref 55–135)
ALT SERPL W/O P-5'-P-CCNC: 19 U/L (ref 10–44)
ANION GAP SERPL CALC-SCNC: 10 MMOL/L (ref 8–16)
AST SERPL-CCNC: 18 U/L (ref 10–40)
BASOPHILS # BLD AUTO: 0.05 K/UL (ref 0–0.2)
BASOPHILS NFR BLD: 0.9 % (ref 0–1.9)
BILIRUB SERPL-MCNC: 0.7 MG/DL (ref 0.1–1)
BUN SERPL-MCNC: 12 MG/DL (ref 8–23)
CALCIUM SERPL-MCNC: 10.1 MG/DL (ref 8.7–10.5)
CHLORIDE SERPL-SCNC: 102 MMOL/L (ref 95–110)
CHOLEST SERPL-MCNC: 171 MG/DL (ref 120–199)
CHOLEST/HDLC SERPL: 4.6 {RATIO} (ref 2–5)
CO2 SERPL-SCNC: 29 MMOL/L (ref 23–29)
CREAT SERPL-MCNC: 1.1 MG/DL (ref 0.5–1.4)
DIFFERENTIAL METHOD BLD: ABNORMAL
EOSINOPHIL # BLD AUTO: 0.1 K/UL (ref 0–0.5)
EOSINOPHIL NFR BLD: 1.2 % (ref 0–8)
ERYTHROCYTE [DISTWIDTH] IN BLOOD BY AUTOMATED COUNT: 13.6 % (ref 11.5–14.5)
EST. GFR  (NO RACE VARIABLE): 55.4 ML/MIN/1.73 M^2
GLUCOSE SERPL-MCNC: 102 MG/DL (ref 70–110)
HCT VFR BLD AUTO: 36 % (ref 37–48.5)
HDLC SERPL-MCNC: 37 MG/DL (ref 40–75)
HDLC SERPL: 21.6 % (ref 20–50)
HGB BLD-MCNC: 11.8 G/DL (ref 12–16)
IMM GRANULOCYTES # BLD AUTO: 0.02 K/UL (ref 0–0.04)
IMM GRANULOCYTES NFR BLD AUTO: 0.3 % (ref 0–0.5)
LDLC SERPL CALC-MCNC: 120.4 MG/DL (ref 63–159)
LYMPHOCYTES # BLD AUTO: 0.8 K/UL (ref 1–4.8)
LYMPHOCYTES NFR BLD: 14.1 % (ref 18–48)
MCH RBC QN AUTO: 27.1 PG (ref 27–31)
MCHC RBC AUTO-ENTMCNC: 32.8 G/DL (ref 32–36)
MCV RBC AUTO: 83 FL (ref 82–98)
MONOCYTES # BLD AUTO: 0.8 K/UL (ref 0.3–1)
MONOCYTES NFR BLD: 14.3 % (ref 4–15)
NEUTROPHILS # BLD AUTO: 4 K/UL (ref 1.8–7.7)
NEUTROPHILS NFR BLD: 69.2 % (ref 38–73)
NONHDLC SERPL-MCNC: 134 MG/DL
NRBC BLD-RTO: 0 /100 WBC
PLATELET # BLD AUTO: 317 K/UL (ref 150–450)
PMV BLD AUTO: 10.1 FL (ref 9.2–12.9)
POTASSIUM SERPL-SCNC: 4.2 MMOL/L (ref 3.5–5.1)
PROT SERPL-MCNC: 8.2 G/DL (ref 6–8.4)
RBC # BLD AUTO: 4.35 M/UL (ref 4–5.4)
SODIUM SERPL-SCNC: 141 MMOL/L (ref 136–145)
TRIGL SERPL-MCNC: 68 MG/DL (ref 30–150)
WBC # BLD AUTO: 5.81 K/UL (ref 3.9–12.7)

## 2024-10-07 PROCEDURE — 80061 LIPID PANEL: CPT | Performed by: NURSE PRACTITIONER

## 2024-10-07 PROCEDURE — 36415 COLL VENOUS BLD VENIPUNCTURE: CPT | Mod: PO | Performed by: NURSE PRACTITIONER

## 2024-10-07 PROCEDURE — 0352U VAGINOSIS SCREEN BY DNA PROBE: CPT | Performed by: NURSE PRACTITIONER

## 2024-10-07 PROCEDURE — 99213 OFFICE O/P EST LOW 20 MIN: CPT | Mod: PBBFAC,PO | Performed by: NURSE PRACTITIONER

## 2024-10-07 PROCEDURE — 85025 COMPLETE CBC W/AUTO DIFF WBC: CPT | Mod: PO | Performed by: NURSE PRACTITIONER

## 2024-10-07 PROCEDURE — 99214 OFFICE O/P EST MOD 30 MIN: CPT | Mod: S$PBB,,, | Performed by: NURSE PRACTITIONER

## 2024-10-07 PROCEDURE — 99999 PR PBB SHADOW E&M-EST. PATIENT-LVL III: CPT | Mod: PBBFAC,,, | Performed by: NURSE PRACTITIONER

## 2024-10-07 PROCEDURE — 80053 COMPREHEN METABOLIC PANEL: CPT | Mod: PO | Performed by: NURSE PRACTITIONER

## 2024-10-07 RX ORDER — AMLODIPINE BESYLATE 5 MG/1
5 TABLET ORAL DAILY
Qty: 90 TABLET | Refills: 3 | Status: SHIPPED | OUTPATIENT
Start: 2024-10-07

## 2024-10-07 RX ORDER — FLUCONAZOLE 150 MG/1
150 TABLET ORAL DAILY
Qty: 2 TABLET | Refills: 0 | Status: SHIPPED | OUTPATIENT
Start: 2024-10-07

## 2024-10-07 NOTE — TELEPHONE ENCOUNTER
----- Message from Reclutec sent at 10/7/2024 11:35 AM CDT -----  Contact: self  ..Type:  RX Refill Request    Who Called: .Bonnie Vazquez  Refill or New Rx:New   RX Name and Strength:  How is the patient currently taking it? (ex. 1XDay):  Is this a 30 day or 90 day RX:  Preferred Pharmacy with phone number:.  [facility] - [place], [place] - [address]  [address] [address] [address]  Phone: [phone] Fax: [phone]  Local or Mail Order:local   Ordering Provider:[facility]   Would the patient rather a call back or a response via [facility]? Call back   Best Call Back Number:.[phone] (home)   Additional Information: pt states she would like an medication called into her pharmacy for her cough, and knee arthritis

## 2024-10-07 NOTE — PROGRESS NOTES
Subjective:       Patient ID: Bonnie Vazquez is a 68 y.o. female.    Chief Complaint: Vaginal Discharge (2 weeks ) and Cough      History of Present Illness    CHIEF COMPLAINT:  - The patient presents for an annual wellness visit and reports vaginal discharge and itching.    HPI:    Mrs. Vazquez presents to visit for annual labs/physical exam.   Declined flu shot and PCV vaccine today. Discussed RSV and covid vaccine from pharmacy.   Due for annual, last two years ago.   Due for dental exam.   Mammo scheduled in November  Due for colonoscopy  Reports vaginal itching for approx 1 week. She initially treated it with an OTC cream (Vagisil) which provided relief. Last week, she noticed the onset of a white, chalky vaginal discharge occurring intermittently, more prominent last week and the week prior. The patient does report an odor associated with the discharge, and has no concerns about sexually transmitted infections or partner symptoms.    The patient denies abdominal pain or tenderness, bowel movement or urination issues, dysuria, urinary frequency, dyspnea, chest pain, recent fevers, or eating problems.    IMAGING:  - Mammogram: Last completed in November, next one due in November (current year)  - Colonoscopy: Last completed in March 2019             Patient Active Problem List   Diagnosis    G6PD deficiency    Anemia    Special screening for malignant neoplasms, colon    Colon polyp    Lipoma of colon    Porcelain gallbladder    Acute right-sided low back pain with right-sided sciatica    Severe obesity (BMI 35.0-39.9) with comorbidity    Chronic rhinitis    Heart murmur    Chronic pain of right knee    Hypertension    Left leg pain    Acute pain of left knee         Past Surgical History:   Procedure Laterality Date    COLONOSCOPY N/A 3/7/2019    Procedure: COLONOSCOPY;  Surgeon: Jerry Sawant MD;  Location: [facility];  Service: Endoscopy;  Laterality: N/A;    HYSTERECTOMY      benign    KIDNEY STONE  "SURGERY      LAPAROSCOPIC LYSIS OF ADHESIONS N/A 2/20/2020    Procedure: LYSIS, ADHESIONS, LAPAROSCOPIC;  Surgeon: Dawson Elizalde MD;  Location: [facility] OR;  Service: General;  Laterality: N/A;    ROBOT-ASSISTED CHOLECYSTECTOMY USING DA DAVINA XI N/A 2/20/2020    Procedure: XI ROBOTIC CHOLECYSTECTOMY;  Surgeon: Dawson Elizalde MD;  Location: [facility] OR;  Service: General;  Laterality: N/A;         Current Outpatient Medications:     hydroCHLOROthiazide (HYDRODIURIL) 12.5 MG Tab, Take 1 tablet by mouth once daily, Disp: 30 tablet, Rfl: 11    multivitamin capsule, Take 1 capsule by mouth once daily., Disp: , Rfl:     amLODIPine (NORVASC) 5 MG tablet, Take 1 tablet (5 mg total) by mouth once daily., Disp: 90 tablet, Rfl: 3    fluconazole (DIFLUCAN) 150 MG Tab, Take 1 tablet (150 mg total) by mouth once daily. May repeat in 72 hours if needed., Disp: 2 tablet, Rfl: 0    Review of Systems   Constitutional:  Negative for activity change, appetite change, chills, diaphoresis, fatigue and fever.   Eyes:  Negative for visual disturbance.   Respiratory:  Positive for cough. Negative for shortness of breath and wheezing.    Cardiovascular:  Negative for chest pain and palpitations.   Gastrointestinal:  Negative for abdominal pain, blood in stool, constipation, diarrhea, nausea and vomiting.   Endocrine: Negative for polydipsia, polyphagia and polyuria.   Genitourinary:  Negative for dysuria, flank pain and urgency.   Musculoskeletal:  Negative for gait problem.   Neurological:  Negative for dizziness, light-headedness and headaches.   Psychiatric/Behavioral:  Negative for dysphoric mood. The patient is not nervous/anxious.        Objective:   /66 (BP Location: Left arm, Patient Position: Sitting)   Pulse 93   Temp 96.1 °F (35.6 °C) (Tympanic)   Ht 5' 1" (1.549 m)   Wt 88.8 kg (195 lb 12.3 oz)   SpO2 96%   BMI 36.99 kg/m²      Physical Exam  Constitutional:       General: She is not in acute distress.     Appearance: Normal " appearance. She is obese. She is not ill-appearing.   HENT:      Head: Normocephalic.   Cardiovascular:      Rate and Rhythm: Normal rate and regular rhythm.      Pulses: Normal pulses.      Heart sounds: Murmur heard.      No friction rub. No gallop.   Pulmonary:      Effort: Pulmonary effort is normal. No respiratory distress.      Breath sounds: Normal breath sounds. No wheezing.   Abdominal:      Palpations: Abdomen is soft.      Tenderness: There is no abdominal tenderness. There is no guarding.   Musculoskeletal:      Right lower leg: No edema.      Left lower leg: No edema.   Skin:     General: Skin is warm and dry.      Coloration: Skin is not pale.      Findings: No erythema.   Neurological:      Mental Status: She is alert and oriented to person, place, and time.   Psychiatric:         Mood and Affect: Mood normal.         Behavior: Behavior normal.         Assessment & Plan     Problem List Items Addressed This Visit          Cardiac/Vascular    Hypertension - Primary    Current Assessment & Plan     Blood pressure stable. Continue current medications.         Relevant Medications    amLODIPine (NORVASC) 5 MG tablet    Other Relevant Orders    COMPREHENSIVE METABOLIC PANEL    Lipid Panel    CBC Auto Differential       Oncology    Anemia (Chronic)    Current Assessment & Plan     Labs today. No acute concerns with bleeding.          Lipoma of colon    Current Assessment & Plan     Colonoscopy ordered.         Relevant Orders    Ambulatory referral/consult to Endo Procedure        Endocrine    Severe obesity (BMI 35.0-39.9) with comorbidity    Current Assessment & Plan     Counseled on importance of diet and exercise in order to improve overall quality of life, and reduce risk of future comorbidities.           Relevant Orders    Lipid Panel       GI    Colon polyp    Relevant Orders    Ambulatory referral/consult to Endo Procedure      Other Visit Diagnoses       Colon cancer screening     "    Relevant Orders    Ambulatory referral/consult to Endo Procedure     Candida infection        Relevant Medications    fluconazole (DIFLUCAN) 150 MG Tab    Vaginal discharge        Relevant Orders    Vaginosis Screen by DNA Probe            Assessment & Plan    MEDICAL DECISION MAKING:  - Assessed medication regimen, discontinuing several supplements and focusing on essential blood pressure medications  - Evaluated need for colonoscopy based on last screening in March 2019  - Considered G6PD deficiency noted in patient's chart since 2013, patient was unaware of diagnosis.   - Assessed vaginal symptoms, initially considering Diflucan for possible yeast infection  - Will perform vaginal swab due to persistent discharge and potential for bacterial vaginosis    PATIENT EDUCATION:  - Explained G6PD deficiency as a genetic disorder affecting RBCs, potentially impacting medication and dietary choices  - Instructed patient on proper technique for self-administered vaginal swab    ACTION ITEMS/LIFESTYLE:  - Recommend engaging in exercise outside of work, such as weighted exercises or walking with weights, to maintain strength and support weight management    ORDERS:  - Ordered cholesterol test and other due labs  - Ordered vaginal swab for evaluation of discharge    FOLLOW UP:  - Follow up for colonoscopy; patient to expect call from facility to schedule procedure  - Follow up on mammogram in November as scheduled  - Follow up for annual eye exam  - Follow up for dental exam every 6 months to 1 year          Follow up in about 6 months (around 4/7/2025) for hypertension.          Portions of this note may have been created with voice recognition software. Occasional "wrong-word" or "sound-a-like" substitutions may have occurred due to the inherent limitations of voice recognition software. Please, read the note carefully and recognize, using context, where substitutions have occurred.       This note was " generated with the assistance of ambient listening technology. Verbal consent was obtained by the patient and accompanying visitor(s) for the recording of patient appointment to facilitate this note. I attest to having reviewed and edited the generated note for accuracy, though some syntax or spelling errors may persist. Please contact the author of this note for any clarification.

## 2024-10-07 NOTE — TELEPHONE ENCOUNTER
pt states she would like an medication called into her pharmacy for her cough, and knee arthritis

## 2024-10-08 ENCOUNTER — TELEPHONE (OUTPATIENT)
Dept: INTERNAL MEDICINE | Facility: CLINIC | Age: 69
End: 2024-10-08
Payer: MEDICARE

## 2024-10-08 NOTE — TELEPHONE ENCOUNTER
----- Message from Elvis sent at 10/8/2024 12:04 PM CDT -----  Contact: Bonnie  .Type:  Needs Medical Advice    Who Called:  Bonnie   Symptoms (please be specific):  Cough    How long has patient had these symptoms:  since 10/04  Pharmacy name and phone #:   ..[facility] - [place] -   [address] [address]  Phone: [phone] Fax: [phone]    Would the patient rather a call back or a response via [facility]?  Call back   Best Call Back Number:  [phone] (home)    Additional Information: Pt is calling in regards to getting a call back to discuss getting medication for coughing     Thanks

## 2024-10-09 RX ORDER — DICLOFENAC SODIUM 10 MG/G
2 GEL TOPICAL 2 TIMES DAILY PRN
Qty: 100 G | Refills: 0 | Status: SHIPPED | OUTPATIENT
Start: 2024-10-09

## 2024-10-09 RX ORDER — BENZONATATE 100 MG/1
100 CAPSULE ORAL 3 TIMES DAILY PRN
Qty: 20 CAPSULE | Refills: 0 | Status: SHIPPED | OUTPATIENT
Start: 2024-10-09

## 2024-10-09 RX ORDER — DICLOFENAC SODIUM 50 MG/1
50 TABLET, DELAYED RELEASE ORAL 3 TIMES DAILY PRN
Qty: 60 TABLET | Refills: 0 | Status: SHIPPED | OUTPATIENT
Start: 2024-10-09

## 2024-10-10 LAB
BACTERIAL VAGINOSIS DNA: DETECTED
CANDIDA GLABRATA/KRUSEI: DETECTED
CANDIDA RRNA VAG QL PROBE: NOT DETECTED
TRICHOMONAS VAGINALIS: NOT DETECTED

## 2024-10-11 DIAGNOSIS — N76.0 BV (BACTERIAL VAGINOSIS): Primary | ICD-10-CM

## 2024-10-11 DIAGNOSIS — B96.89 BV (BACTERIAL VAGINOSIS): Primary | ICD-10-CM

## 2024-10-11 RX ORDER — METRONIDAZOLE 500 MG/1
500 TABLET ORAL EVERY 12 HOURS
Qty: 14 TABLET | Refills: 0 | Status: SHIPPED | OUTPATIENT
Start: 2024-10-11 | End: 2024-10-18

## 2024-10-15 ENCOUNTER — HOSPITAL ENCOUNTER (OUTPATIENT)
Dept: PREADMISSION TESTING | Facility: HOSPITAL | Age: 69
Discharge: HOME | End: 2024-10-15
Attending: COLON & RECTAL SURGERY
Payer: MEDICARE

## 2024-10-15 ENCOUNTER — TELEPHONE (OUTPATIENT)
Dept: PREADMISSION TESTING | Facility: HOSPITAL | Age: 69
End: 2024-10-15
Payer: MEDICARE

## 2024-10-15 DIAGNOSIS — D17.5 LIPOMA OF COLON: ICD-10-CM

## 2024-10-15 DIAGNOSIS — Z12.11 COLON CANCER SCREENING: ICD-10-CM

## 2024-10-15 DIAGNOSIS — K63.5 POLYP OF COLON, UNSPECIFIED PART OF COLON, UNSPECIFIED TYPE: ICD-10-CM

## 2024-10-15 NOTE — TELEPHONE ENCOUNTER
PAT appt- called patient to schedule colonoscopy, she declined. stated she wanted to have the procedure done during the summer months when she is off from work. Informed patient of referral  being valid for six months.  patient will contact pcp for an updated referral closer to her desired procedure time.

## 2024-11-07 ENCOUNTER — OFFICE VISIT (OUTPATIENT)
Dept: CARDIOLOGY | Facility: CLINIC | Age: 69
End: 2024-11-07
Payer: MEDICARE

## 2024-11-07 VITALS
HEART RATE: 64 BPM | WEIGHT: 192.88 LBS | SYSTOLIC BLOOD PRESSURE: 130 MMHG | OXYGEN SATURATION: 96 % | BODY MASS INDEX: 36.45 KG/M2 | DIASTOLIC BLOOD PRESSURE: 77 MMHG

## 2024-11-07 DIAGNOSIS — E66.01 SEVERE OBESITY (BMI 35.0-39.9) WITH COMORBIDITY: Primary | ICD-10-CM

## 2024-11-07 DIAGNOSIS — I34.0 MILD MITRAL REGURGITATION: ICD-10-CM

## 2024-11-07 DIAGNOSIS — I10 PRIMARY HYPERTENSION: ICD-10-CM

## 2024-11-07 PROCEDURE — 99213 OFFICE O/P EST LOW 20 MIN: CPT | Mod: PBBFAC | Performed by: INTERNAL MEDICINE

## 2024-11-07 PROCEDURE — 99999 PR PBB SHADOW E&M-EST. PATIENT-LVL III: CPT | Mod: PBBFAC,,, | Performed by: INTERNAL MEDICINE

## 2024-11-07 PROCEDURE — 99214 OFFICE O/P EST MOD 30 MIN: CPT | Mod: S$PBB,,, | Performed by: INTERNAL MEDICINE

## 2024-11-07 RX ORDER — HYDROCHLOROTHIAZIDE 25 MG/1
25 TABLET ORAL DAILY
Qty: 30 TABLET | Refills: 11 | Status: SHIPPED | OUTPATIENT
Start: 2024-11-07

## 2024-11-07 RX ORDER — LEVOCETIRIZINE DIHYDROCHLORIDE 5 MG/1
TABLET, FILM COATED ORAL
COMMUNITY

## 2024-11-07 NOTE — PROGRESS NOTES
Subjective:   Patient ID:  Bonnie Vazquez is a 69 y.o. female who presents for evaluation of Follow-up      Follow-up  Pertinent negatives include no chest pain.   November 7, 2024.  Has not  Comes in for a 1 year follow-up.    Doing well.  No report of any chest pain, palpitations syncope or presyncope , dyspnea on exertion or significant lower extremity swelling  She states she exercises stays active without any limitations  BP sometimes higher    3.25.2024  For six-month follow-up   Had echocardiogram after last visit mild mitral regurgitation on my review  Started on HCTZ last visit.  Later she had to go to the emergency room.  She was started on amlodipine.  However she states that she has not taking that.    Her blood pressure is elevated today.    She states that she improved in her salt restriction however she still.           9.2023  67-year-old female with history below.    Never smoker.    Comes in for evaluation of a cardiac murmur heard on exam by PCP.    She does have a systolic 1 to 2/6 murmur.    She denies any chest pain, orthopnea or PND.    She does have history of elevated blood pressure but no history of hypertension.    She has 2+ pitting edema in her lower extremities.    She states that she eats regular.  No specific low salt restriction.      Past Medical History:   Diagnosis Date    Anemia     Arthritis     G6PD deficiency     History of other drug therapy 9/7/2021 1:12:05 PM    [facility] Historical - Unknown: COVID-19 vaccine series completed-Moderna    History of other drug therapy 9/7/2021 1:12:05 PM    [facility] Historical - Unknown: COVID-19 vaccine series completed-Moderna    Kidney stone     Other diseases of respiratory system 9/7/2021 1:12:18 PM    [facility] Historical - Unknown: COVID-19 virus infection-No Additional Notes    Other diseases of respiratory system 9/7/2021 1:12:18 PM    [facility] Historical - Unknown: COVID-19 virus infection-No Additional Notes        Past Surgical History:   Procedure Laterality Date    COLONOSCOPY N/A 3/7/2019    Procedure: COLONOSCOPY;  Surgeon: Jerry Sawant MD;  Location: [facility];  Service: Endoscopy;  Laterality: N/A;    HYSTERECTOMY      benign    KIDNEY STONE SURGERY      LAPAROSCOPIC LYSIS OF ADHESIONS N/A 2/20/2020    Procedure: LYSIS, ADHESIONS, LAPAROSCOPIC;  Surgeon: Dawson Elizalde MD;  Location: [facility];  Service: General;  Laterality: N/A;    ROBOT-ASSISTED CHOLECYSTECTOMY USING DA DAVINA XI N/A 2/20/2020    Procedure: XI ROBOTIC CHOLECYSTECTOMY;  Surgeon: Dawson Elizalde MD;  Location: [facility];  Service: General;  Laterality: N/A;       Social History     Tobacco Use    Smoking status: Never    Smokeless tobacco: Never   Substance Use Topics    Alcohol use: Yes     Alcohol/week: 0.0 standard drinks of alcohol     Comment: socially  No alcohol 72h prior to sx     Drug use: No       Family History   Problem Relation Name Age of Onset    Heart disease Mother      Cataracts Mother      Diabetes Father      Diabetes Brother      Breast cancer Neg Hx      Colon cancer Neg Hx      Ovarian cancer Neg Hx      Thrombophilia Neg Hx         Review of Systems   Cardiovascular:  Negative for chest pain, dyspnea on exertion, palpitations and syncope.   Genitourinary: Negative.    Neurological: Negative.        Current Outpatient Medications on File Prior to Visit   Medication Sig    amLODIPine (NORVASC) 5 MG tablet Take 1 tablet (5 mg total) by mouth once daily.    diclofenac (VOLTAREN) 50 MG EC tablet Take 1 tablet (50 mg total) by mouth 3 (three) times daily as needed (pain).    multivitamin capsule Take 1 capsule by mouth once daily.    [DISCONTINUED] hydroCHLOROthiazide (HYDRODIURIL) 12.5 MG Tab Take 1 tablet by mouth once daily    benzonatate (TESSALON) 100 MG capsule Take 1 capsule (100 mg total) by mouth 3 (three) times daily as needed for Cough. (Patient not taking: Reported on 11/7/2024)    diclofenac sodium (VOLTAREN) 1 % Gel Apply  2 g topically 2 (two) times daily as needed. (Patient not taking: Reported on 11/7/2024)    fluconazole (DIFLUCAN) 150 MG Tab Take 1 tablet (150 mg total) by mouth once daily. May repeat in 72 hours if needed. (Patient not taking: Reported on 11/7/2024)    levocetirizine (XYZAL) 5 MG tablet 1 tablet in the evening Orally Once a day for 30 day(s)     No current facility-administered medications on file prior to visit.       Objective:   Objective:  Wt Readings from Last 3 Encounters:   11/07/24 87.5 kg (192 lb 14.4 oz)   10/07/24 88.8 kg (195 lb 12.3 oz)   03/25/24 89.7 kg (197 lb 12 oz)     Temp Readings from Last 3 Encounters:   10/07/24 96.1 °F (35.6 °C) (Tympanic)   10/19/23 98.1 °F (36.7 °C) (Temporal)   10/07/23 98.6 °F (37 °C) (Oral)     BP Readings from Last 3 Encounters:   11/07/24 130/77   10/07/24 130/66   03/25/24 (!) 142/70     Pulse Readings from Last 3 Encounters:   11/07/24 64   10/07/24 93   03/25/24 70       Physical Exam  Vitals reviewed.   Constitutional:       Appearance: She is well-developed.   Neck:      Vascular: No carotid bruit.   Cardiovascular:      Rate and Rhythm: Normal rate and regular rhythm.      Pulses: Intact distal pulses.      Heart sounds: Normal heart sounds. No murmur heard.  Pulmonary:      Breath sounds: Normal breath sounds.   Musculoskeletal:         General: Swelling present.   Neurological:      Mental Status: She is oriented to person, place, and time.         Lab Results   Component Value Date    CHOL 171 10/07/2024    CHOL 163 09/21/2023    CHOL 167 01/25/2022     Lab Results   Component Value Date    HDL 37 (L) 10/07/2024    HDL 41 09/21/2023    HDL 46 01/25/2022     Lab Results   Component Value Date    LDLCALC 120.4 10/07/2024    LDLCALC 109.6 09/21/2023    LDLCALC 105.8 01/25/2022     Lab Results   Component Value Date    TRIG 68 10/07/2024    TRIG 62 09/21/2023    TRIG 76 01/25/2022     Lab Results   Component Value Date    CHOLHDL 21.6 10/07/2024    CHOLHDL  25.2 09/21/2023    CHOLHDL 27.5 01/25/2022       Chemistry        Component Value Date/Time     10/07/2024 0912    K 4.2 10/07/2024 0912     10/07/2024 0912    CO2 29 10/07/2024 0912    BUN 12 10/07/2024 0912    CREATININE 1.1 10/07/2024 0912     10/07/2024 0912        Component Value Date/Time    CALCIUM 10.1 10/07/2024 0912    ALKPHOS 96 10/07/2024 0912    AST 18 10/07/2024 0912    ALT 19 10/07/2024 0912    BILITOT 0.7 10/07/2024 0912    ESTGFRAFRICA >60.0 01/25/2022 1701    EGFRNONAA 53.2 (A) 01/25/2022 1701          Lab Results   Component Value Date    TSH 1.597 09/21/2023     Lab Results   Component Value Date    INR 1.0 04/19/2010     Lab Results   Component Value Date    WBC 5.81 10/07/2024    HGB 11.8 (L) 10/07/2024    HCT 36.0 (L) 10/07/2024    MCV 83 10/07/2024     10/07/2024     BNP  @LABRCNTIP(BNP,BNPTRIAGEBLO)@  CrCl cannot be calculated (Patient's most recent lab result is older than the maximum 7 days allowed.).     Imaging:  ======    No results found for this or any previous visit.    No results found for this or any previous visit.    Results for orders placed during the hospital encounter of 01/14/20    X-Ray Chest PA And Lateral    Narrative  EXAMINATION:  XR CHEST PA AND LATERAL    CLINICAL HISTORY:  Other specified diseases of gallbladder    TECHNIQUE:  PA and lateral views of the chest were performed.    COMPARISON:  Chest x-ray from 04/19/2010    FINDINGS:  Clear lungs.  No effusion.  Cardiomediastinal silhouette and osseous structures are stable in appearance with degenerative changes of the spine noted.    Impression  Stable chest.  No active disease.      Electronically signed by: Alexi Carr MD  Date:    01/14/2020  Time:    11:10    No results found for this or any previous visit.    No valid procedures specified.    No results found for this or any previous visit.      No results found for this or any previous visit.      No results found for this or any  previous visit.      Diagnostic Results:  ECG: Reviewed    The 10-year ASCVD risk score (Monica MEDRANO, et al., 2019) is: 9.5%    Values used to calculate the score:      Age: 69 years      Sex: Female      Is Non- : Yes      Diabetic: No      Tobacco smoker: No      Systolic Blood Pressure: 130 mmHg      Is BP treated: Yes      HDL Cholesterol: 37 mg/dL      Total Cholesterol: 171 mg/dL  Sinus bradycardia   Nonspecific T wave abnormality   Abnormal ECG   When compared with ECG of 14-JAN-2020 12:59,   No significant change was found     Referred By: ISSAC MILLIGAN          Results for orders placed during the hospital encounter of 09/27/23    Echo    Interpretation Summary    Left Ventricle: The left ventricle is normal in size. Normal wall thickness. Normal wall motion. There is normal systolic function with a visually estimated ejection fraction of 60 - 65%. There is normal diastolic function.    Right Ventricle: Normal right ventricular cavity size. Wall thickness is normal. Right ventricle wall motion  is normal. Systolic function is normal.    Pulmonary Artery: The estimated pulmonary artery systolic pressure is 22 mmHg.    IVC/SVC: Normal venous pressure at 3 mmHg.      Assessment and Plan:   Severe obesity (BMI 35.0-39.9) with comorbidity    Primary hypertension  -     hydroCHLOROthiazide (HYDRODIURIL) 25 MG tablet; Take 1 tablet (25 mg total) by mouth once daily.  Dispense: 30 tablet; Refill: 11    Mild mitral regurgitation      Increase HCTZ.  Admits non salt restriction sometimes.  Increase p.o. fluid intake  Continue with amlodipine  Reviewed all tests and above medical conditions with patient in detail and formulated treatment plan.  Risk factor modification discussed.   Cardiac low salt diet discussed.  Maintaining healthy weight and weight loss goals were discussed in clinic.    Follow up in  12 months

## 2025-01-09 ENCOUNTER — HOSPITAL ENCOUNTER (OUTPATIENT)
Dept: PREADMISSION TESTING | Facility: HOSPITAL | Age: 70
Discharge: HOME | End: 2025-01-09
Attending: FAMILY MEDICINE
Payer: MEDICARE

## 2025-01-30 ENCOUNTER — HOSPITAL ENCOUNTER (OUTPATIENT)
Dept: PREADMISSION TESTING | Facility: HOSPITAL | Age: 70
Discharge: HOME | End: 2025-01-30
Attending: FAMILY MEDICINE
Payer: MEDICARE

## 2025-01-30 DIAGNOSIS — Z12.11 SPECIAL SCREENING FOR MALIGNANT NEOPLASMS, COLON: Primary | ICD-10-CM

## 2025-01-30 DIAGNOSIS — K63.5 POLYP OF COLON, UNSPECIFIED PART OF COLON, UNSPECIFIED TYPE: ICD-10-CM

## 2025-01-30 DIAGNOSIS — D17.5 LIPOMA OF COLON: ICD-10-CM

## 2025-01-30 RX ORDER — SODIUM, POTASSIUM,MAG SULFATES 17.5-3.13G
1 SOLUTION, RECONSTITUTED, ORAL ORAL DAILY
Qty: 1 KIT | Refills: 0 | Status: SHIPPED | OUTPATIENT
Start: 2025-01-30 | End: 2025-02-01

## 2025-02-27 ENCOUNTER — ANESTHESIA EVENT (OUTPATIENT)
Dept: ENDOSCOPY | Facility: HOSPITAL | Age: 70
End: 2025-02-27
Payer: MEDICARE

## 2025-02-27 NOTE — ANESTHESIA PREPROCEDURE EVALUATION
02/27/2025  Bonnie Vazquez is a 69 y.o., female.  Past Medical History:   Diagnosis Date    Anemia     Arthritis     G6PD deficiency     History of other drug therapy 9/7/2021 1:12:05 PM    [facility] Historical - Unknown: COVID-19 vaccine series completed-Moderna    History of other drug therapy 9/7/2021 1:12:05 PM    [facility] Historical - Unknown: COVID-19 vaccine series completed-Moderna    Kidney stone     Other diseases of respiratory system 9/7/2021 1:12:18 PM    [facility] Historical - Unknown: COVID-19 virus infection-No Additional Notes    Other diseases of respiratory system 9/7/2021 1:12:18 PM    [facility] Historical - Unknown: COVID-19 virus infection-No Additional Notes     Past Surgical History:   Procedure Laterality Date    COLONOSCOPY N/A 3/7/2019    Procedure: COLONOSCOPY;  Surgeon: Jerry Sawant MD;  Location: [facility];  Service: Endoscopy;  Laterality: N/A;    HYSTERECTOMY      benign    KIDNEY STONE SURGERY      LAPAROSCOPIC LYSIS OF ADHESIONS N/A 2/20/2020    Procedure: LYSIS, ADHESIONS, LAPAROSCOPIC;  Surgeon: Dawson Elizalde MD;  Location: [facility] OR;  Service: General;  Laterality: N/A;    ROBOT-ASSISTED CHOLECYSTECTOMY USING DA DAVINA XI N/A 2/20/2020    Procedure: XI ROBOTIC CHOLECYSTECTOMY;  Surgeon: Dawson Elizalde MD;  Location: [facility] OR;  Service: General;  Laterality: N/A;         Pre-op Assessment    I have reviewed the Patient Summary Reports.     I have reviewed the Nursing Notes. I have reviewed the NPO Status.   I have reviewed the Medications.     Review of Systems  Anesthesia Hx:  No problems with previous Anesthesia   History of prior surgery of interest to airway management or planning:  Previous anesthesia: General        Denies Family Hx of Anesthesia complications.    Denies Personal Hx of Anesthesia complications.                    Social:  Non-Smoker, Social  Alcohol Use       Hematology/Oncology:                   Hematology Comments: G6PD deficiency                    Cardiovascular:     Hypertension                                          Renal/:     Hx of kidney stones             Hepatic/GI:  Bowel Prep.                   Musculoskeletal:  Arthritis          Spine Disorders: lumbar            Endocrine:        Obesity / BMI > 30      Physical Exam  General: Alert and Oriented    Airway:  Mallampati: II   Mouth Opening: Normal  TM Distance: Normal  Tongue: Normal  Neck ROM: Normal ROM    Dental:  Intact    Chest/Lungs:  Clear to auscultation, Normal Respiratory Rate    Heart:  Rate: Normal  Rhythm: Regular Rhythm        Anesthesia Plan  Type of Anesthesia, risks & benefits discussed:    Anesthesia Type: Gen Natural Airway  Intra-op Monitoring Plan: Standard ASA Monitors  Post Op Pain Control Plan: multimodal analgesia  Induction:  IV  Informed Consent: Informed consent signed with the Patient and all parties understand the risks and agree with anesthesia plan.  All questions answered.   ASA Score: 2  Day of Surgery Review of History & Physical: H&P Update referred to the surgeon/provider.    Ready For Surgery From Anesthesia Perspective.     .

## 2025-03-03 ENCOUNTER — HOSPITAL ENCOUNTER (OUTPATIENT)
Dept: ENDOSCOPY | Facility: HOSPITAL | Age: 70
Discharge: HOME | End: 2025-03-03
Attending: NURSE PRACTITIONER | Admitting: INTERNAL MEDICINE
Payer: MEDICARE

## 2025-03-03 ENCOUNTER — ANESTHESIA (OUTPATIENT)
Dept: ENDOSCOPY | Facility: HOSPITAL | Age: 70
End: 2025-03-03
Payer: MEDICARE

## 2025-03-03 VITALS
SYSTOLIC BLOOD PRESSURE: 128 MMHG | WEIGHT: 190.94 LBS | TEMPERATURE: 97 F | OXYGEN SATURATION: 100 % | HEART RATE: 68 BPM | HEIGHT: 61 IN | RESPIRATION RATE: 20 BRPM | DIASTOLIC BLOOD PRESSURE: 62 MMHG | BODY MASS INDEX: 36.05 KG/M2

## 2025-03-03 DIAGNOSIS — Z12.11 SPECIAL SCREENING FOR MALIGNANT NEOPLASMS, COLON: Primary | ICD-10-CM

## 2025-03-03 DIAGNOSIS — D17.5 LIPOMA OF COLON: ICD-10-CM

## 2025-03-03 DIAGNOSIS — K63.5 POLYP OF COLON, UNSPECIFIED PART OF COLON, UNSPECIFIED TYPE: ICD-10-CM

## 2025-03-03 PROCEDURE — 25000003 PHARM REV CODE 250: Performed by: NURSE ANESTHETIST, CERTIFIED REGISTERED

## 2025-03-03 PROCEDURE — 27201012 HC FORCEPS, HOT/COLD, DISP

## 2025-03-03 PROCEDURE — 37000009 HC ANESTHESIA EA ADD 15 MINS

## 2025-03-03 PROCEDURE — 63600175 PHARM REV CODE 636 W HCPCS: Performed by: NURSE ANESTHETIST, CERTIFIED REGISTERED

## 2025-03-03 PROCEDURE — D9220A PRA ANESTHESIA: Mod: PT,,, | Performed by: NURSE ANESTHETIST, CERTIFIED REGISTERED

## 2025-03-03 PROCEDURE — 37000008 HC ANESTHESIA 1ST 15 MINUTES

## 2025-03-03 RX ORDER — DEXTROMETHORPHAN/PSEUDOEPHED 2.5-7.5/.8
DROPS ORAL
Status: COMPLETED | OUTPATIENT
Start: 2025-03-03 | End: 2025-03-03

## 2025-03-03 RX ORDER — LIDOCAINE HYDROCHLORIDE 20 MG/ML
INJECTION, SOLUTION EPIDURAL; INFILTRATION; INTRACAUDAL; PERINEURAL
Status: DISCONTINUED | OUTPATIENT
Start: 2025-03-03 | End: 2025-03-03

## 2025-03-03 RX ORDER — PROPOFOL 10 MG/ML
VIAL (ML) INTRAVENOUS
Status: DISCONTINUED | OUTPATIENT
Start: 2025-03-03 | End: 2025-03-03

## 2025-03-03 RX ADMIN — LIDOCAINE HYDROCHLORIDE 50 MG: 20 INJECTION, SOLUTION EPIDURAL; INFILTRATION; INTRACAUDAL; PERINEURAL at 07:03

## 2025-03-03 RX ADMIN — PROPOFOL 30 MG: 10 INJECTION, EMULSION INTRAVENOUS at 07:03

## 2025-03-03 RX ADMIN — PROPOFOL 20 MG: 10 INJECTION, EMULSION INTRAVENOUS at 07:03

## 2025-03-03 RX ADMIN — PROPOFOL 100 MG: 10 INJECTION, EMULSION INTRAVENOUS at 07:03

## 2025-03-03 RX ADMIN — SIMETHICONE 40 MG: 20 SUSPENSION/ DROPS ORAL at 07:03

## 2025-03-03 RX ADMIN — SODIUM CHLORIDE, POTASSIUM CHLORIDE, SODIUM LACTATE AND CALCIUM CHLORIDE: 600; 310; 30; 20 INJECTION, SOLUTION INTRAVENOUS at 07:03

## 2025-03-03 NOTE — DISCHARGE INSTRUCTIONS
During your procedure today, you received medications for sedation.  These   medications may affect your judgment, balance and coordination.  Therefore,   for 24 hours, you have the following restrictions:   - DO NOT drive a car, operate machinery, make legal/financial decisions,   sign important papers or drink alcohol.    ACTIVITY:  Today: no heavy lifting, straining or running due to procedural   sedation/anesthesia.  The following day: return to full activity including work.  DIET:  Eat and drink normally unless instructed otherwise.                TREATMENT FOR COMMON SIDE EFFECTS:  - Mild abdominal pain, nausea, belching, bloating or excessive gas:  rest,   eat lightly and use a heating pad.  - Sore Throat: treat with throat lozenges and/or gargle with warm salt   water.  - Because air was used during the procedure, expelling large amounts of air   from your rectum or belching is normal.  - If a bowel prep was taken, you may not have a bowel movement for 1-3 days.    This is normal.  SYMPTOMS TO WATCH FOR AND REPORT TO YOUR PHYSICIAN:  1. Abdominal pain or bloating, other than gas cramps.  2. Chest pain.  3. Back pain.  4. Signs of infection such as: chills or fever occurring within 24 hours   after the procedure.  5. Rectal bleeding, which would show as bright red, maroon, or black stools.   (A tablespoon of blood from the rectum is not serious, especially if   hemorrhoids are present.)  6. Vomiting.  7. Weakness or dizziness.  GO DIRECTLY TO THE NEAREST EMERGENCY ROOM IF YOU HAVE ANY OF THE FOLLOWING:                 Difficulty breathing              Chills and/or fever over 101 F              Persistent vomiting and/or vomiting blood              Severe abdominal pain              Severe chest pain              Black, tarry stools              Bleeding- more than one tablespoon              Any other symptom or condition that you feel may need urgent attention    Your doctor recommends these additional  instructions:  If any biopsies were taken, your doctors clinic will contact you in 1 to 2   weeks with any results.  - Discharge patient to home.   - Resume previous diet.   - Continue present medications.    - Repeat colonoscopy in _10_ years for surveillance.   - Return to referring physician as previously scheduled.   - Patient has a contact number available for emergencies.  The signs and   symptoms of potential delayed complications were discussed with the   patient.  Return to normal activities tomorrow.  Written discharge   instructions were provided to the patient.  If you have any questions about the above instructions, call the GI   department at [phone] or call the endoscopy unit at [phone]   from 7am until 3 pm.  [facility] [place], EMERGENCY ROOM PHONE NUMBER:   [phone]  IF A COMPLICATION OR EMERGENCY SITUATION ARISES AND YOU ARE UNABLE TO REACH   YOUR PHYSICIAN - GO DIRECTLY TO THE EMERGENCY ROOM.  I have read or have had read to me these discharge instructions for my   procedure and have received a written copy.  I understand these   instructions and will follow-up with my physician if I have any questions.

## 2025-03-03 NOTE — H&P
Short Stay Endoscopy History and Physical    PCP - Yajaira Rolle NP    Procedure - Colonoscopy  ASA - per anesthesia  Mallampati - per anesthesia  History of Anesthesia problems - no  Family history Anesthesia problems -  no     HPI:  This is a 69 y.o. female here for evaluation of :   Active Hospital Problems    Diagnosis  POA    *Special screening for malignant neoplasms, colon [Z12.11]  Not Applicable      Resolved Hospital Problems   No resolved problems to display.         Health Maintenance         Date Due Completion Date    Pneumococcal Vaccines (Age 50+) (1 of 1 - PCV) Never done ---    RSV Vaccine (Age 60+ and Pregnant patients) (1 - Risk 60-74 years 1-dose series) Never done ---    Influenza Vaccine (1) 09/01/2024 10/19/2023    COVID-19 Vaccine (5 - 2024-25 season) 09/01/2024 10/23/2022    Lipid Panel 10/07/2025 10/7/2024    Mammogram 11/25/2025 11/25/2024    Override on 11/1/2013: (N/S)    Hemoglobin A1c (Diabetic Prevention Screening) 09/21/2026 9/21/2023    DEXA Scan 09/12/2027 9/12/2022    Colorectal Cancer Screening 03/07/2029 3/7/2019    TETANUS VACCINE 01/25/2032 1/25/2022              ROS:  CONSTITUTIONAL: Denies weight change,  fatigue, fevers, chills, night sweats.  CARDIOVASCULAR: Denies chest pain, shortness of breath, orthopnea and edema.  RESPIRATORY: Denies cough, hemoptysis, dyspnea, and wheezing.  GI: See HPI.    Medical History:   Past Medical History:   Diagnosis Date    Anemia     Arthritis     G6PD deficiency     History of other drug therapy 9/7/2021 1:12:05 PM    [facility] [employer] Historical - Unknown: COVID-19 vaccine series completed-Moderna    History of other drug therapy 9/7/2021 1:12:05 PM    [facility] [employer] Historical - Unknown: COVID-19 vaccine series completed-Moderna    Kidney stone     Other diseases of respiratory system 9/7/2021 1:12:18 PM    [facility] [employer] Historical - Unknown: COVID-19 virus infection-No Additional Notes    Other diseases of respiratory system 9/7/2021  1:12:18 PM    [facility] Historical - Unknown: COVID-19 virus infection-No Additional Notes       Surgical History:   Past Surgical History:   Procedure Laterality Date    COLONOSCOPY N/A 3/7/2019    Procedure: COLONOSCOPY;  Surgeon: Jerry Sawant MD;  Location: [facility] ENDO;  Service: Endoscopy;  Laterality: N/A;    HYSTERECTOMY      benign    KIDNEY STONE SURGERY      LAPAROSCOPIC LYSIS OF ADHESIONS N/A 2/20/2020    Procedure: LYSIS, ADHESIONS, LAPAROSCOPIC;  Surgeon: Dawson Elizalde MD;  Location: [facility] OR;  Service: General;  Laterality: N/A;    ROBOT-ASSISTED CHOLECYSTECTOMY USING DA DAVINA XI N/A 2/20/2020    Procedure: XI ROBOTIC CHOLECYSTECTOMY;  Surgeon: Dawson Elizalde MD;  Location: [facility] OR;  Service: General;  Laterality: N/A;       Family History:   Family History   Problem Relation Name Age of Onset    Heart disease Mother      Cataracts Mother      Diabetes Father      Diabetes Brother      Breast cancer Neg Hx      Colon cancer Neg Hx      Ovarian cancer Neg Hx      Thrombophilia Neg Hx         Social History:   Social History[1]    Allergies:   Review of patient's allergies indicates:   Allergen Reactions    Codeine      Sweating, hot and cold,nervous, jittery       Medications:   Medications Ordered Prior to Encounter[2]    Physical Exam:  Vital Signs:   Vitals:    03/03/25 0627   BP: (!) 147/73   Pulse: 64   Resp: 16   Temp: 97.2 °F (36.2 °C)     General Appearance: Well appearing in no acute distress  ENT: OP clear  Chest: CTA B  CV: RRR, no m/r/g  Abd: s/nt/nd/nabs  Ext: no edema    Labs:Reviewed    IMP:  Active Hospital Problems    Diagnosis  POA    *Special screening for malignant neoplasms, colon [Z12.11]  Not Applicable      Resolved Hospital Problems   No resolved problems to display.         Plan:   I have explained the risks and benefits of colonoscopy to the patient including but not limited to bleeding, perforation, infection, and death. The patient wishes to proceed.         [1]   Social  History  Tobacco Use    Smoking status: Never    Smokeless tobacco: Never   Substance Use Topics    Alcohol use: Yes     Alcohol/week: 0.0 standard drinks of alcohol     Comment: socially  No alcohol 72h prior to sx     Drug use: No   [2]   Current Outpatient Medications on File Prior to Encounter   Medication Sig Dispense Refill    amLODIPine (NORVASC) 5 MG tablet Take 1 tablet (5 mg total) by mouth once daily. 90 tablet 3    diclofenac sodium (VOLTAREN) 1 % Gel Apply 2 g topically 2 (two) times daily as needed. 100 g 0    hydroCHLOROthiazide (HYDRODIURIL) 25 MG tablet Take 1 tablet (25 mg total) by mouth once daily. 30 tablet 11    levocetirizine (XYZAL) 5 MG tablet 1 tablet in the evening Orally Once a day for 30 day(s)      multivitamin capsule Take 1 capsule by mouth once daily.      benzonatate (TESSALON) 100 MG capsule Take 1 capsule (100 mg total) by mouth 3 (three) times daily as needed for Cough. (Patient not taking: Reported on 11/25/2024) 20 capsule 0    diclofenac (VOLTAREN) 50 MG EC tablet Take 1 tablet (50 mg total) by mouth 3 (three) times daily as needed (pain). 60 tablet 0    fluconazole (DIFLUCAN) 150 MG Tab Take 1 tablet (150 mg total) by mouth once daily. May repeat in 72 hours if needed. (Patient not taking: Reported on 11/7/2024) 2 tablet 0     No current facility-administered medications on file prior to encounter.

## 2025-03-03 NOTE — ANESTHESIA POSTPROCEDURE EVALUATION
Anesthesia Post Evaluation    Patient: Bonnie Vazquez    Procedure(s) Performed: * No procedures listed *    Final Anesthesia Type: general      Patient location during evaluation: PACU  Patient participation: Yes- Able to Participate  Level of consciousness: awake and alert and oriented  Post-procedure vital signs: reviewed and stable  Pain management: adequate  Airway patency: patent    PONV status at discharge: No PONV  Anesthetic complications: no      Cardiovascular status: blood pressure returned to baseline, stable and hemodynamically stable  Respiratory status: unassisted  Hydration status: euvolemic  Follow-up not needed.              Vitals Value Taken Time   /66 03/03/25 08:20   Temp 36.2 °C (97.1 °F) 03/03/25 07:51   Pulse 60 03/03/25 08:25   Resp 19 03/03/25 08:25   SpO2 100 % 03/03/25 08:24   Vitals shown include unfiled device data.      Event Time   Out of Recovery 08:28:00         Pain/Macho Score: Macho Score: 10 (3/3/2025  8:11 AM)

## 2025-03-03 NOTE — DISCHARGE SUMMARY
[facility] - Endoscopy 1st Fl  Discharge Note  Short Stay    Colonoscopy      OUTCOME: Patient tolerated treatment/procedure well without complication and is now ready for discharge.    DISPOSITION: Home or Self Care    FINAL DIAGNOSIS:  Special screening for malignant neoplasms, colon    FOLLOWUP: With primary care provider    DISCHARGE INSTRUCTIONS:  No discharge procedures on file.

## 2025-03-03 NOTE — TRANSFER OF CARE
"Anesthesia Transfer of Care Note    Patient: Bonnie Vazquez    Procedure(s) Performed: * No procedures listed *    Patient location: PACU    Anesthesia Type: general    Transport from OR: Transported from OR on room air with adequate spontaneous ventilation    Post pain: adequate analgesia    Post assessment: no apparent anesthetic complications and tolerated procedure well    Post vital signs: stable    Level of consciousness: awake    Nausea/Vomiting: no nausea/vomiting    Complications: none    Transfer of care protocol was followed      Last vitals: Visit Vitals  BP (!) 147/73 (BP Location: Right arm)   Pulse 64   Temp 36.2 °C (97.2 °F) (Temporal)   Resp 16   Ht 5' 1" (1.549 m)   Wt 86.6 kg (190 lb 14.7 oz)   SpO2 97%   Breastfeeding No   BMI 36.07 kg/m²     "

## 2025-04-07 ENCOUNTER — OFFICE VISIT (OUTPATIENT)
Dept: INTERNAL MEDICINE | Facility: CLINIC | Age: 70
End: 2025-04-07
Payer: MEDICARE

## 2025-04-07 ENCOUNTER — LAB VISIT (OUTPATIENT)
Dept: LAB | Facility: HOSPITAL | Age: 70
End: 2025-04-07
Attending: NURSE PRACTITIONER
Payer: MEDICARE

## 2025-04-07 VITALS
BODY MASS INDEX: 35.75 KG/M2 | HEART RATE: 79 BPM | WEIGHT: 189.38 LBS | OXYGEN SATURATION: 97 % | RESPIRATION RATE: 16 BRPM | DIASTOLIC BLOOD PRESSURE: 62 MMHG | TEMPERATURE: 97 F | HEIGHT: 61 IN | SYSTOLIC BLOOD PRESSURE: 124 MMHG

## 2025-04-07 DIAGNOSIS — M25.561 CHRONIC PAIN OF RIGHT KNEE: ICD-10-CM

## 2025-04-07 DIAGNOSIS — D75.A G6PD DEFICIENCY: ICD-10-CM

## 2025-04-07 DIAGNOSIS — I10 HYPERTENSION, UNSPECIFIED TYPE: Primary | ICD-10-CM

## 2025-04-07 DIAGNOSIS — G89.29 CHRONIC PAIN OF RIGHT KNEE: ICD-10-CM

## 2025-04-07 DIAGNOSIS — E66.01 SEVERE OBESITY (BMI 35.0-39.9) WITH COMORBIDITY: ICD-10-CM

## 2025-04-07 DIAGNOSIS — D75.A G6PD DEFICIENCY: Chronic | ICD-10-CM

## 2025-04-07 DIAGNOSIS — I10 HYPERTENSION, UNSPECIFIED TYPE: ICD-10-CM

## 2025-04-07 LAB
ABSOLUTE EOSINOPHIL (OHS): 0.15 K/UL
ABSOLUTE MONOCYTE (OHS): 0.86 K/UL (ref 0.3–1)
ABSOLUTE NEUTROPHIL COUNT (OHS): 5.77 K/UL (ref 1.8–7.7)
ANION GAP (OHS): 8 MMOL/L (ref 8–16)
BASOPHILS # BLD AUTO: 0.07 K/UL
BASOPHILS NFR BLD AUTO: 0.8 %
BUN SERPL-MCNC: 20 MG/DL (ref 8–23)
CALCIUM SERPL-MCNC: 9.7 MG/DL (ref 8.7–10.5)
CHLORIDE SERPL-SCNC: 104 MMOL/L (ref 95–110)
CO2 SERPL-SCNC: 30 MMOL/L (ref 23–29)
CREAT SERPL-MCNC: 0.9 MG/DL (ref 0.5–1.4)
ERYTHROCYTE [DISTWIDTH] IN BLOOD BY AUTOMATED COUNT: 13.8 % (ref 11.5–14.5)
GFR SERPLBLD CREATININE-BSD FMLA CKD-EPI: >60 ML/MIN/1.73/M2
GLUCOSE SERPL-MCNC: 94 MG/DL (ref 70–110)
HCT VFR BLD AUTO: 34.9 % (ref 37–48.5)
HGB BLD-MCNC: 11.2 GM/DL (ref 12–16)
IMM GRANULOCYTES # BLD AUTO: 0.03 K/UL (ref 0–0.04)
IMM GRANULOCYTES NFR BLD AUTO: 0.4 % (ref 0–0.5)
LYMPHOCYTES # BLD AUTO: 1.49 K/UL (ref 1–4.8)
MCH RBC QN AUTO: 26.5 PG (ref 27–31)
MCHC RBC AUTO-ENTMCNC: 32.1 G/DL (ref 32–36)
MCV RBC AUTO: 83 FL (ref 82–98)
NUCLEATED RBC (/100WBC) (OHS): 0 /100 WBC
PLATELET # BLD AUTO: 323 K/UL (ref 150–450)
PMV BLD AUTO: 10.5 FL (ref 9.2–12.9)
POTASSIUM SERPL-SCNC: 5.2 MMOL/L (ref 3.5–5.1)
RBC # BLD AUTO: 4.23 M/UL (ref 4–5.4)
RELATIVE EOSINOPHIL (OHS): 1.8 %
RELATIVE LYMPHOCYTE (OHS): 17.8 % (ref 18–48)
RELATIVE MONOCYTE (OHS): 10.3 % (ref 4–15)
RELATIVE NEUTROPHIL (OHS): 68.9 % (ref 38–73)
SODIUM SERPL-SCNC: 142 MMOL/L (ref 136–145)
WBC # BLD AUTO: 8.37 K/UL (ref 3.9–12.7)

## 2025-04-07 PROCEDURE — 36415 COLL VENOUS BLD VENIPUNCTURE: CPT | Mod: PO

## 2025-04-07 PROCEDURE — 80048 BASIC METABOLIC PNL TOTAL CA: CPT

## 2025-04-07 PROCEDURE — 99214 OFFICE O/P EST MOD 30 MIN: CPT | Mod: S$PBB,,, | Performed by: NURSE PRACTITIONER

## 2025-04-07 PROCEDURE — 99999 PR PBB SHADOW E&M-EST. PATIENT-LVL IV: CPT | Mod: PBBFAC,,, | Performed by: NURSE PRACTITIONER

## 2025-04-07 PROCEDURE — 85025 COMPLETE CBC W/AUTO DIFF WBC: CPT

## 2025-04-07 PROCEDURE — G2211 COMPLEX E/M VISIT ADD ON: HCPCS | Mod: S$PBB,,, | Performed by: NURSE PRACTITIONER

## 2025-04-07 PROCEDURE — 99214 OFFICE O/P EST MOD 30 MIN: CPT | Mod: PBBFAC,PO | Performed by: NURSE PRACTITIONER

## 2025-04-07 NOTE — PROGRESS NOTES
Subjective:       Patient ID: Bonnie Vazquez is a 69 y.o. female.    Chief Complaint: Hypertension      History of Present Illness    CHIEF COMPLAINT:  - Ms. Vazquez presents for a six-month follow-up visit to manage hypertension and discuss preventive care.    HPI:  Ms. Vazquez reports intermittent knee pain, not present today, attributed to arthritis confirmed by an x-ray two years ago showing arthritic changes. She maintains physical activity by walking frequently, particularly at work. Her blood pressure is noted to be well-controlled during this visit. She had a colonoscopy on March 3, 2025, a well-woman exam in November 2024, and was evaluated by a cardiologist in 10/2024. A mammogram has been scheduled for the end of this year. She reports difficulty with nighttime vision and acknowledges the need for an eye exam. Her last eye exam was more than a year ago, as she did not attend an exam last year. She plans to scheduled appt for eye exam.     She denies chest pain, shortness of breath, lightheadedness, dizziness, blurred vision, problems with urination, depression, anxiety, and swelling in the legs.    She has no other acute complaints today.     Problem List[1]      Past Surgical History:   Procedure Laterality Date    COLONOSCOPY N/A 3/7/2019    Procedure: COLONOSCOPY;  Surgeon: Jerry Sawant MD;  Location: [facility];  Service: Endoscopy;  Laterality: N/A;    HYSTERECTOMY      benign    KIDNEY STONE SURGERY      LAPAROSCOPIC LYSIS OF ADHESIONS N/A 2/20/2020    Procedure: LYSIS, ADHESIONS, LAPAROSCOPIC;  Surgeon: Dawson Elizalde MD;  Location: [facility] OR;  Service: General;  Laterality: N/A;    ROBOT-ASSISTED CHOLECYSTECTOMY USING DA DAVINA XI N/A 2/20/2020    Procedure: XI ROBOTIC CHOLECYSTECTOMY;  Surgeon: Dawson Elizalde MD;  Location: [facility] OR;  Service: General;  Laterality: N/A;       Current Medications[2]    Review of Systems   Constitutional:  Negative for appetite change, chills, fatigue and fever.  "  Eyes:  Negative for visual disturbance.   Respiratory:  Negative for cough, chest tightness, shortness of breath and wheezing.    Cardiovascular:  Negative for chest pain and palpitations.   Gastrointestinal:  Negative for abdominal pain, nausea and vomiting.   Genitourinary:  Negative for dysuria and frequency.   Musculoskeletal:  Positive for arthralgias.   Neurological:  Negative for dizziness, light-headedness and headaches.   Psychiatric/Behavioral:  Negative for dysphoric mood. The patient is not nervous/anxious.        Objective:   /62 (BP Location: Left arm, Patient Position: Sitting)   Pulse 79   Temp 96.6 °F (35.9 °C) (Tympanic)   Resp 16   Ht 5' 1" (1.549 m)   Wt 85.9 kg (189 lb 6 oz)   SpO2 97%   BMI 35.78 kg/m²      Physical Exam  Constitutional:       General: She is not in acute distress.     Appearance: Normal appearance. She is obese. She is not ill-appearing.   HENT:      Head: Normocephalic.   Cardiovascular:      Rate and Rhythm: Normal rate and regular rhythm.      Pulses: Normal pulses.      Heart sounds: Murmur heard.      No friction rub. No gallop.   Pulmonary:      Effort: Pulmonary effort is normal. No respiratory distress.      Breath sounds: Normal breath sounds. No wheezing.   Musculoskeletal:      Right lower leg: No edema.      Left lower leg: No edema.   Skin:     General: Skin is warm and dry.      Coloration: Skin is not pale.      Findings: No erythema.   Neurological:      Mental Status: She is alert and oriented to person, place, and time.   Psychiatric:         Mood and Affect: Mood normal.         Behavior: Behavior normal.         Assessment & Plan     Problem List Items Addressed This Visit          Cardiac/Vascular    Hypertension - Primary    Current Assessment & Plan   Blood pressure stable. Continue current medication.         Relevant Orders    CBC Auto Differential    Basic Metabolic Panel       Oncology    G6PD deficiency (Chronic)    Relevant Orders    " "CBC Auto Differential       Endocrine    Severe obesity (BMI 35.0-39.9) with comorbidity    Current Assessment & Plan   Counseled on importance of diet and exercise in order to improve overall quality of life, and reduce risk of future comorbidities.              Orthopedic    Chronic pain of right knee    Current Assessment & Plan   Xray reviewed. Discussed ortho referral. Pt will reach out if symptoms do not improve.             Assessment & Plan    MEDICAL DECISION MAKING:  - BP well-controlled.  - Noted mild reduction in renal function from previous visit.  - Knee pain attributed to previously diagnosed arthritis based on XR from 2 years ago.  - Reviewed recent preventive care: colonoscopy completed March 3, 2025; well-woman exam done November 2024; mammogram scheduled.    PATIENT EDUCATION:  - Discussed importance of annual eye exams, especially given age and vision changes.    ACTION ITEMS/LIFESTYLE:  - Ms. Vazquez to focus on hydration to support renal function.  - Ms. Vazquez to continue current walking routine for exercise.    MEDICATIONS:  - continue Diclofenac gel for knee pain, to be used as needed while renal function remains stable.    ORDERS:  - Ordered renal function test to reassess mild reduction noted in previous results.    REFERRALS:  - Consider orthopedic consultation for knee pain if it persists, to discuss potential injection treatments.    FOLLOW UP:  - Follow up when labs will be due.  - Schedule eye exam          Follow up in about 6 months (around 10/7/2025), or if symptoms worsen or fail to improve, for hypertension.    Visit today included increased complexity associated with the care of the episodic problem  addressed and managing the longitudinal care of the patient due to the serious and/or complex managed problem(s) .        Portions of this note may have been created with voice recognition software. Occasional "wrong-word" or "sound-a-like" substitutions may have occurred due to " the inherent limitations of voice recognition software. Please, read the note carefully and recognize, using context, where substitutions have occurred.       This note was generated with the assistance of ambient listening technology. Verbal consent was obtained by the patient and accompanying visitor(s) for the recording of patient appointment to facilitate this note. I attest to having reviewed and edited the generated note for accuracy, though some syntax or spelling errors may persist. Please contact the author of this note for any clarification.            [1]   Patient Active Problem List  Diagnosis    G6PD deficiency    Anemia    Special screening for malignant neoplasms, colon    Colon polyp    Lipoma of colon    Porcelain gallbladder    Acute right-sided low back pain with right-sided sciatica    Severe obesity (BMI 35.0-39.9) with comorbidity    Chronic rhinitis    Heart murmur    Chronic pain of right knee    Hypertension    Left leg pain    Acute pain of left knee   [2]   Current Outpatient Medications:     amLODIPine (NORVASC) 5 MG tablet, Take 1 tablet (5 mg total) by mouth once daily., Disp: 90 tablet, Rfl: 3    diclofenac sodium (VOLTAREN) 1 % Gel, Apply 2 g topically 2 (two) times daily as needed., Disp: 100 g, Rfl: 0    hydroCHLOROthiazide (HYDRODIURIL) 25 MG tablet, Take 1 tablet (25 mg total) by mouth once daily., Disp: 30 tablet, Rfl: 11    levocetirizine (XYZAL) 5 MG tablet, 1 tablet in the evening Orally Once a day for 30 day(s), Disp: , Rfl:     multivitamin capsule, Take 1 capsule by mouth once daily., Disp: , Rfl:

## 2025-04-08 ENCOUNTER — RESULTS FOLLOW-UP (OUTPATIENT)
Dept: INTERNAL MEDICINE | Facility: CLINIC | Age: 70
End: 2025-04-08

## 2025-04-08 DIAGNOSIS — E87.5 HYPERKALEMIA: Primary | ICD-10-CM

## 2025-07-28 DIAGNOSIS — Z00.00 ENCOUNTER FOR MEDICARE ANNUAL WELLNESS EXAM: ICD-10-CM

## (undated) DEVICE — DRAPE ABDOMINAL TIBURON 14X11

## (undated) DEVICE — UNDERGLOVES BIOGEL PI SZ 7 LF

## (undated) DEVICE — SYR 30CC LUER LOCK

## (undated) DEVICE — SEE MEDLINE ITEM 157027

## (undated) DEVICE — SOL NS 1000CC

## (undated) DEVICE — OBTURATOR BLADELESS 8MM XI CLR

## (undated) DEVICE — DRAPE COLUMN DAVINCI XI

## (undated) DEVICE — SUT VICRYL PLUS 4-0 P3 18IN

## (undated) DEVICE — SCISSOR 5MMX35CM DIRECT DRIVE

## (undated) DEVICE — SUT VICRYL 4-0 ANTIBACT

## (undated) DEVICE — ELECTRODE REM PLYHSV RETURN 9

## (undated) DEVICE — SYR 3CC LUER LOC

## (undated) DEVICE — SEE MEDLINE ITEM 157131

## (undated) DEVICE — TUBING HEATED INSUFFLATOR

## (undated) DEVICE — SUPPORT ULNA NERVE PROTECTOR

## (undated) DEVICE — SEAL UNIVERSAL 5MM-8MM XI

## (undated) DEVICE — APPLICATOR CHLORAPREP ORN 26ML

## (undated) DEVICE — NDL PNEUMO INSUFFLATI 120MM

## (undated) DEVICE — COVER TIP CURVED SCISSORS XI

## (undated) DEVICE — SEE MEDLINE ITEM 157117

## (undated) DEVICE — HEMOSTAT SURGICEL 2X3IN

## (undated) DEVICE — SEE MEDLINE ITEM 152622

## (undated) DEVICE — NDL SAFETY 22G X 1.5 ECLIPSE

## (undated) DEVICE — COVER OVERHEAD SURG LT BLUE

## (undated) DEVICE — GLOVE SURGICAL LATEX SZ 7

## (undated) DEVICE — KIT ANTIFOG

## (undated) DEVICE — MANIFOLD 4 PORT

## (undated) DEVICE — ADHESIVE MASTISOL VIAL 48/BX

## (undated) DEVICE — BAG TISSUE RETRIEVAL 5MM

## (undated) DEVICE — CLOSURE SKIN STERI STRIP 1/2X4

## (undated) DEVICE — EVACUATOR KIT SMOKE PLUME AWAY

## (undated) DEVICE — SUT VICRYL 0 SH

## (undated) DEVICE — NDL INSUF ULTRA VERESS 120MM

## (undated) DEVICE — DRAPE ARM DAVINCI XI

## (undated) DEVICE — IRRIGATOR ENDOWRIST XI SUCTION

## (undated) DEVICE — POSITIONER HEAD DONUT 9IN FOAM

## (undated) DEVICE — SOL STRL WATER INJ 1000ML BG

## (undated) DEVICE — CLIP HEMO-LOK ML